# Patient Record
Sex: FEMALE | Race: WHITE | NOT HISPANIC OR LATINO | Employment: FULL TIME | URBAN - METROPOLITAN AREA
[De-identification: names, ages, dates, MRNs, and addresses within clinical notes are randomized per-mention and may not be internally consistent; named-entity substitution may affect disease eponyms.]

---

## 2017-01-17 ENCOUNTER — LAB VISIT (OUTPATIENT)
Dept: LAB | Facility: HOSPITAL | Age: 47
End: 2017-01-17
Payer: COMMERCIAL

## 2017-01-17 ENCOUNTER — OFFICE VISIT (OUTPATIENT)
Dept: OBSTETRICS AND GYNECOLOGY | Facility: CLINIC | Age: 47
End: 2017-01-17
Payer: COMMERCIAL

## 2017-01-17 VITALS
HEIGHT: 65 IN | BODY MASS INDEX: 19.48 KG/M2 | SYSTOLIC BLOOD PRESSURE: 92 MMHG | WEIGHT: 116.94 LBS | DIASTOLIC BLOOD PRESSURE: 64 MMHG

## 2017-01-17 DIAGNOSIS — Z12.31 SCREENING MAMMOGRAM, ENCOUNTER FOR: ICD-10-CM

## 2017-01-17 DIAGNOSIS — F32.89 OTHER DEPRESSION: ICD-10-CM

## 2017-01-17 DIAGNOSIS — Z12.31 ENCOUNTER FOR SCREENING MAMMOGRAM FOR MALIGNANT NEOPLASM OF BREAST: ICD-10-CM

## 2017-01-17 DIAGNOSIS — N95.1 SYMPTOMATIC MENOPAUSAL OR FEMALE CLIMACTERIC STATES: ICD-10-CM

## 2017-01-17 DIAGNOSIS — N64.4 BREAST TENDERNESS: ICD-10-CM

## 2017-01-17 DIAGNOSIS — R14.0 BLOATING: ICD-10-CM

## 2017-01-17 DIAGNOSIS — N95.1 SYMPTOMATIC MENOPAUSAL OR FEMALE CLIMACTERIC STATES: Primary | ICD-10-CM

## 2017-01-17 LAB
FSH SERPL-ACNC: 5 MIU/ML
T4 FREE SERPL-MCNC: 0.95 NG/DL
TSH SERPL DL<=0.005 MIU/L-ACNC: 3.43 UIU/ML

## 2017-01-17 PROCEDURE — 84439 ASSAY OF FREE THYROXINE: CPT

## 2017-01-17 PROCEDURE — 84443 ASSAY THYROID STIM HORMONE: CPT

## 2017-01-17 PROCEDURE — 83001 ASSAY OF GONADOTROPIN (FSH): CPT

## 2017-01-17 PROCEDURE — 1159F MED LIST DOCD IN RCRD: CPT | Mod: S$GLB,,, | Performed by: NURSE PRACTITIONER

## 2017-01-17 PROCEDURE — 99999 PR PBB SHADOW E&M-EST. PATIENT-LVL III: CPT | Mod: PBBFAC,,, | Performed by: NURSE PRACTITIONER

## 2017-01-17 PROCEDURE — 99202 OFFICE O/P NEW SF 15 MIN: CPT | Mod: S$GLB,,, | Performed by: NURSE PRACTITIONER

## 2017-01-17 RX ORDER — IVERMECTIN 10 MG/G
CREAM TOPICAL
COMMUNITY
Start: 2016-12-29 | End: 2018-12-17

## 2017-01-17 RX ORDER — DAPSONE 50 MG/G
GEL TOPICAL
COMMUNITY
Start: 2016-12-29 | End: 2018-06-04

## 2017-01-17 RX ORDER — BUDESONIDE 3 MG/1
3 CAPSULE, COATED PELLETS ORAL 3 TIMES DAILY
Refills: 3 | COMMUNITY
Start: 2016-11-30 | End: 2017-09-13 | Stop reason: SDUPTHER

## 2017-01-17 NOTE — PATIENT INSTRUCTIONS
Things to Remember about Feminine Hygiene and Safety   1. Wipe from front to back after using the bathroom   2. If possible, urinate before and definitely after sexual intercourse or masturbation   3. I recommend bathing with liquid soap vs a bar soap. Non-scented antibacterial soap: Dial or Neutrogena soap   4. Shower or rinse off before sitting in a bathtub full of dirty water   5. Do not douche of any kind   6. It is recommended that you take in plenty of fluids. Eight glasses of 8 ounces of water is recommended daily (UNLESS MEDICAL PROBLEMS INDICATE OTHERWISE)   7. It is recommended to change tampons and pads every 2-3 hours or as saturated   8. Use condoms to protect yourself against Sexually Transmitted Infections   9. Wear your seat belt    Self Breast Exam/Awareness (SBE)  1. Examine your breast monthly, about 4-7 days after the start of your cycle. If you no longer get a cycle pick the same day of the month to perform SBE.  2. Look for any dimpling, rashes, nipple discharge, or changes in breast size  3. Feel for any lumps or hard nodules (example, frozen vegetable) using the pads of your fingers. Make sure to examine under the arm  4. The best time to perform examine is either while in the shower or while lying down.  5. You know your breast and if you are more aware of what your breasts normally feel like then you will be able to identify changes more quickly and notify your health care provider    Tobacco Resources    Pick a start date and stick with it   OTC options: nicotine gum, patch, lozenge   Talk with your primary care doctor about prescription options: Nicotine nasal spray, Bupropion (pill), Varenicline (pill)   Inform clothes family and friends of quit date for support   Call 7-539-DEQFKDY (738-5086) to be connected with the quit line in your state (free)   Avoid being around smoking, avoid alcohol, eat healthy food and exercise   Keep positive attitude, You Can Do It!    Healthy Weight  Resources   Myfitnesspal (can be found on [employer] store--free)   National Weight Control Registry    Follow the 9 inch plate method below to help with portion control. ( ½ plate vegetable, ¼ plate starch and ¼ plate protein) After Start incorporating healthy foods in each plate section.   DASH Diet--lowering blood pressure (http://www.nhlbi.nih.gov/health/public/heart/hbp/dash/new_dash.pdf)     Calcium and Vitamin D  Prepared for the subscribers of  Pharmacists Letter / Prescribers Letter to give to their patients.  Copyright © 2012 by [employer]  www.pharmacistsletter.com ~ www.prescribersletter.com  Why do I need calcium and vitamin D?  Calcium and vitamin D are needed for strong bones. Nerves, muscles, and blood  vessels need calcium to work. Vitamin D helps the body absorb calcium. Vitamin D  helps strengthen muscles and prevent falls in older people. Vitamin D might also  help prevent cancer and heart disease.  What are sources of calcium and vitamin D?  Calcium is found in foods. Dairy products are good sources. Eight ounces of yogurt  (228 gram) or milk (1 cup [236 mL]), or 1.5 oz. (43 gram) of cheese, can provide  around 300 mg. Orange juice with added calcium has 300 mg per 8 oz. (236 mL)  serving. Vitamin D is made by sun-exposed skin. It is also found in some foods.  [place] is one of the best sources. A 3 oz. (86 gram) serving of sockeye salmon has  almost 800 IU. A 3 oz. serving of tuna canned in water has about  150 IU. Dairy products with added vitamin D are good sources. Examples are a cup  of milk (115 to 124 IU) or 6 ozs. (171 grams) of yogurt (80 IU). A cup of orange  juice with added vitamin D has 80 IU. Calcium and vitamin D supplements are also  available.  Do I need a supplement? Are they safe?  Many people are low on vitamin D. It is hard to get enough vitamin D from food.  And most people dont get much sun exposure. They use sunscreens, stay indoors, or  live at a  northern latitude. So most people need a vitamin D supplement. Ask if you  should have your vitamin D level checked.  People get about 300 mg calcium from nondairy foods daily. If you eat two servings  of high-calcium foods (e.g., dairy), you can get around 900 mg per day total. Adding  a 300 mg calcium supplement daily, or a third high-calcium serving, will provide a  total of 1200 mg daily. You may have heard calcium supplements are not safe.  There has been bad press about heart attacks and prostate cancer. Calcium  supplements have not been proven unsafe. But dont go overboard with calcium  supplements. Get your calcium from diet when possible. Avoid calcium  supplements from coral or dolomite, a kind of limestone. They can contain heavy  metals like lead.  How do I choose a calcium or vitamin D supplement?  Most calcium products are calcium carbonate (e.g., Tums, Caltrate) or calcium  citrate (e.g., Citracal). Both work. Calcium carbonate doesnt cost much and  provides the most calcium per dose. Read the label to check the calcium amount  per serving. This can vary based on the type of calcium you select. Calcium  Calcium and Vitamin D  Prepared for the subscribers of  Pharmacists Letter / Prescribers Letter to give to their patients.  Copyright © 2012 by [employer]  www.pharmacistsletter.com ~ www.prescribersletter.com  citrate may be better for patients who dont absorb calcium as well. Examples are  older people or those on certain heartburn medications. Calcium is best absorbed if  not more than 500 mg is taken at a time. Some supplements contain other  ingredients (e.g., magnesium, vitamin K). These dont work any better than those  with just calcium. Vitamin D is available over-the-counter in some calcium products  or by itself. There are also high-dose vitamin D products that are prescribed if you  have low vitamin D levels. It is okay to take a multivitamin or eat vitamin  Dcontaining  foods while taking prescription-strength vitamin D. Vitamin D comes as  vitamin D2 or vitamin D3. Either can be used. In the [place] look for a vitamin D  supplement that is USP Verified. In [place], look for a product with a Natural  Product Number (NPN). These products have been tested for good quality.  How much calcium and vitamin D do I need?  Women up to 50 years old and men up to age 70 should aim for 1000 mg of calcium  daily total (from food and supplements). Women over 50 years old and men over  70 should aim for 1200 mg of calcium daily total (from food and supplements).  Most experts recommend that adults get 800 IU to 2000 IU of vitamin D daily for the  best health benefits.  [June 2012]

## 2017-01-17 NOTE — PROGRESS NOTES
"Chief Complaint: depression/bloating/breast tenderness/weight gain      HPI: 47 y.o. female here with multiple concerns. She reports that she was seen for annual in June and was having bloating and 2lb weight gain and feels as though she can't loose weight. She also feels increased breast tenderness around the time of her cycle and occasional hot flashes since exam in June, no changes. She reports no changes in cycles, still monthly and 3-5 days and no changes in flow. She had a TVUS that was essentially neg.   In a long discussion with the pt she reports feeling depressed over the last year and has had a lot of life changes, she is no longer with her partner since Oct 2015, has work stress as well. She feels this is all contributing as well. She hasn't talked with psych and also hasn't tried COCs bc she didn't like the idea of not having a cycle. She states she feels depressed but does deny SI/HI. Reports being "obsessed" with her weight since around age 22.     ROS   Systemic: Not feeling tired (fatigue).  No fever chills   Gastrointestinal: No nausea, vomiting, no abdominal pain.  No diarrhea.  Genitourinary: No dysuria. No Pelvic Pain  Skin: No rash.  Visit Vitals    BP 92/64    Ht 5' 5" (1.651 m)    Wt 53 kg (116 lb 15.3 oz)    LMP 01/12/2017 (Exact Date)    BMI 19.46 kg/m2     Physical Exam  Vital Signs: ° Normal.  General Appearance: ° well developed.  ° Well nourished.  Neck: °Symmetrical °Trachea appearance mid-line  Eyes: °Extra-ocular movements normal   Psychiatric: Affect: ° Normal.  Neurological: ° No disorientation       Plan:  1. Bloating/Weight gain--reassurance given with pt about 2 lb weight gain is not considered abnormal and also with increased exercise this could be r/t muscle weight.    2. Depression--referral to psych discussed and number to Chrissy Lynne given today. She is not interested in medication at this time.    3. Perimenopausal/Climacteric Symptoms--hormone levels ordered and discussed " SANDY trial for 3 months. Declines at this time.\    4. Breast tenderness-- pt has not had fu mmg and due Dec 2016. She will schedule apt to make sure no underling etiology, pt has h/o breast cyst    Long discussion with pt and this may all be related to number 2. Will call with lab results and changes in plan.

## 2017-01-17 NOTE — MR AVS SNAPSHOT
[facility]  [address]  [place] [address]  Phone: [phone]  Fax: [phone]                  Raiza Cherry   2017 8:00 AM   Office Visit    Description:  Female : 1970   Provider:  Kaitlin Mejia NP   Department:  [facility]           Reason for Visit     Premenstrual Syndrome           Diagnoses this Visit        Comments    Screening mammogram, encounter for    -  Primary     Symptomatic menopausal or female climacteric states         Encounter for screening mammogram for malignant neoplasm of breast                To Do List           Future Appointments        Provider Department Dept Phone    2017 9:00 AM Estuardo Robles MD [facility] [phone]      Goals (5 Years of Data)     None      [facility]     [facility] On [facility] Nurse Care Line -  Assistance  Registered nurses in the [facility] Call Center provide clinical advisement, health education, appointment booking, and other advisory services.  Call for this free service at [phone].             Medications           Message regarding Medications     Verify the changes and/or additions to your medication regime listed below are the same as discussed with your clinician today.  If any of these changes or additions are incorrect, please notify your healthcare provider.             Verify that the below list of medications is an accurate representation of the medications you are currently taking.  If none reported, the list may be blank. If incorrect, please contact your healthcare provider. Carry this list with you in case of emergency.           Current Medications     ACZONE 5 % topical gel     SOOLANTRA 1 % Crea     timolol maleate 0.5% (TIMOPTIC) 0.5 % Drop Place 1 drop into both eyes 2 (two) times daily.    budesonide (ENTOCORT EC) 3 mg capsule Take 3 mg by mouth 3 (three) times daily.           Clinical Reference Information           Vital Signs -  "Last Recorded  Most recent update: 1/17/2017  8:18 AM by Gricelda Restrepo MA    BP Ht Wt LMP BMI    92/64 5' 5" (1.651 m) 53 kg (116 lb 15.3 oz) 01/12/2017 (Exact Date) 19.46 kg/m2      Blood Pressure          Most Recent Value    BP  92/64      Allergies as of 1/17/2017     Penicillin      Immunizations Administered on Date of Encounter - 1/17/2017     None      Orders Placed During Today's Visit      Normal Orders This Visit    Mammo Digital Screening Bilat with Tomosynthesis_CAD     Future Labs/Procedures Expected by Expires    Follicle stimulating hormone  1/17/2017 3/18/2018    Mammo Digital Screening Bilat With CAD  1/17/2017 3/17/2018    Mammo Digital Screening Bilat with Tomosynthesis_CAD  1/17/2017 3/17/2018    T4, free  1/17/2017 3/18/2018    TSH  1/17/2017 3/18/2018      [facility] Sign-Up     Activating your [facility] account is as easy as 1-2-3!     1) Visit [email or web address], select Sign Up Now, enter this activation code and your date of birth, then select Next.  [email or web address]  Expires: 3/3/2017  8:53 AM      2) Create a username and password to use when you visit [facility] in the future and select a security question in case you lose your password and select Next.    3) Enter your e-mail address and click Sign Up!    Additional Information  If you have questions, please e-mail [email or web address] or call [phone] to talk to our [facility] staff. Remember, [facility] is NOT to be used for urgent needs. For medical emergencies, dial 911.         "

## 2017-01-19 ENCOUNTER — TELEPHONE (OUTPATIENT)
Dept: OBSTETRICS AND GYNECOLOGY | Facility: CLINIC | Age: 47
End: 2017-01-19

## 2017-01-19 DIAGNOSIS — N92.4 ABNORMAL PERIMENOPAUSAL BLEEDING: Primary | ICD-10-CM

## 2017-01-19 DIAGNOSIS — N95.1 PERIMENOPAUSAL SYMPTOMS: ICD-10-CM

## 2017-01-19 RX ORDER — NORETHINDRONE ACETATE AND ETHINYL ESTRADIOL 1MG-20(21)
1 KIT ORAL DAILY
Qty: 28 TABLET | Refills: 3 | Status: SHIPPED | OUTPATIENT
Start: 2017-01-19 | End: 2017-09-21

## 2017-01-19 NOTE — TELEPHONE ENCOUNTER
Discussed lab results with pt. She is hesitant but would like to start Loestrin to help with them monthly bloating and cramping. She will also set apt with psych.  In discussing with pt if she does start Loestrin she will call to make a 3 month FU apt to review symptoms and check for symptom improvement and BP check.

## 2017-01-24 ENCOUNTER — TELEPHONE (OUTPATIENT)
Dept: OBSTETRICS AND GYNECOLOGY | Facility: CLINIC | Age: 47
End: 2017-01-24

## 2017-09-12 ENCOUNTER — TELEPHONE (OUTPATIENT)
Dept: GASTROENTEROLOGY | Facility: CLINIC | Age: 47
End: 2017-09-12

## 2017-09-12 NOTE — TELEPHONE ENCOUNTER
Spoke with the patient in regards to medications refill, I informed the patient that I will speak with the doctor in regards.

## 2017-09-12 NOTE — TELEPHONE ENCOUNTER
----- Message from Yumiko Martin sent at 9/12/2017 12:04 PM CDT -----  Contact: [phone]  Patient says her pharmacy has been trying to reach you regarding her refills of  Budesonide 3 mg and suppositories. Please advise.

## 2017-09-13 DIAGNOSIS — K51.919: Primary | ICD-10-CM

## 2017-09-13 RX ORDER — BUDESONIDE 3 MG/1
3 CAPSULE, COATED PELLETS ORAL 3 TIMES DAILY
Qty: 90 CAPSULE | Refills: 3 | Status: SHIPPED | OUTPATIENT
Start: 2017-09-13 | End: 2018-01-18 | Stop reason: SDUPTHER

## 2017-09-21 ENCOUNTER — OFFICE VISIT (OUTPATIENT)
Dept: OBSTETRICS AND GYNECOLOGY | Facility: CLINIC | Age: 47
End: 2017-09-21
Payer: COMMERCIAL

## 2017-09-21 VITALS
DIASTOLIC BLOOD PRESSURE: 72 MMHG | SYSTOLIC BLOOD PRESSURE: 110 MMHG | BODY MASS INDEX: 19.43 KG/M2 | HEIGHT: 65 IN | WEIGHT: 116.63 LBS

## 2017-09-21 DIAGNOSIS — Z01.419 ENCOUNTER FOR GYNECOLOGICAL EXAMINATION: ICD-10-CM

## 2017-09-21 DIAGNOSIS — Z12.31 ENCOUNTER FOR SCREENING MAMMOGRAM FOR BREAST CANCER: Primary | ICD-10-CM

## 2017-09-21 DIAGNOSIS — Z12.4 SCREENING FOR MALIGNANT NEOPLASM OF THE CERVIX: ICD-10-CM

## 2017-09-21 PROCEDURE — 99396 PREV VISIT EST AGE 40-64: CPT | Mod: S$GLB,,, | Performed by: OBSTETRICS & GYNECOLOGY

## 2017-09-21 PROCEDURE — 88141 CYTOPATH C/V INTERPRET: CPT | Mod: ,,, | Performed by: PATHOLOGY

## 2017-09-21 PROCEDURE — 88175 CYTOPATH C/V AUTO FLUID REDO: CPT | Performed by: PATHOLOGY

## 2017-09-21 PROCEDURE — 99999 PR PBB SHADOW E&M-EST. PATIENT-LVL III: CPT | Mod: PBBFAC,,, | Performed by: OBSTETRICS & GYNECOLOGY

## 2017-09-21 NOTE — PROGRESS NOTES
CC: Well woman exam    Raiza Cherry is a 47 y.o. female  presents for a well woman exam.  She is established.  LMP: Patient's last menstrual period was 2017 (approximate)..   Last menstrual period .  Periods are starting to become slightly irregular and occasionally has night sweats and sleep disturbances.  Patient does not desire OCPs nor medication.  Takes melatonin currently for sleep.  She currently is not tracking her cycles.  Encouraged her to keep track of her menstrual cycles on menstrual cycle at.  Will follow back up with her in 6 months when we repeat her Pap smear.    Annual Exam and 6month repeat pap --   Last Pap/Hpv 16, ASCUS/HPV Neg --   Last MMG 17, Normal    Last colonoscopy 4 years ago with Dr. Crawley.  Patient has ulcerative colitis and has an appointment with him next month.      Health Maintenance   Topic Date Due    Lipid Panel  1970    TETANUS VACCINE  1986    Influenza Vaccine  2017    Mammogram  2019    Pap Smear with HPV Cotest  2019         Past Medical History:   Diagnosis Date    Abnormal Pap smear of cervix     Glaucoma     Insomnia     Ulcerative colitis     proctitis       Past Surgical History:   Procedure Laterality Date    BREAST SURGERY      enlargement and lift of both breasts    CATARACT EXTRACTION      left eye    CERVICAL BIOPSY  W/ LOOP ELECTRODE EXCISION  2016    Moderate dysplasia completely excised margins clear and ECC negative       OB History    Para Term  AB Living   3 3 3     3   SAB TAB Ectopic Multiple Live Births           3      # Outcome Date GA Lbr Jordon/2nd Weight Sex Delivery Anes PTL Lv   3 Term 10/24/07 40w0d  3.204 kg (7 lb 1 oz) M Vag-Spont EPI  KM   2 Term 96 40w0d  3.43 kg (7 lb 9 oz) F Vag-Spont EPI  KM   1 Term 04/15/93 40w0d  3.09 kg (6 lb 13 oz) F Vag-Spont EPI  KM      Obstetric Comments   Menarche ~ 11       Family History  "  Problem Relation Age of Onset    Diabetes Mother     Breast cancer Neg Hx     Colon cancer Neg Hx     Ovarian cancer Neg Hx     Cancer Neg Hx        Social History   Substance Use Topics    Smoking status: Never Smoker    Smokeless tobacco: Never Used    Alcohol use Yes      Comment: Rarely       /72   Ht 5' 5" (1.651 m)   Wt 52.9 kg (116 lb 10 oz)   LMP 08/17/2017 (Approximate)   BMI 19.41 kg/m²       ROS:  GENERAL: Denies weight gain or weight loss. Feeling well overall.   SKIN: Denies rash or lesions.   HEAD: Denies head injury or headache.   NODES: Denies enlarged lymph nodes.   CHEST: Denies chest pain or shortness of breath.   CARDIOVASCULAR: Denies palpitations or left sided chest pain.   ABDOMEN: No abdominal pain, constipation, diarrhea, nausea, vomiting or rectal bleeding.   URINARY: No frequency, dysuria, hematuria, or burning on urination.  REPRODUCTIVE: See HPI.   BREASTS: The patient performs breast self-examination and denies pain, lumps, or nipple discharge.   HEMATOLOGIC: No easy bruisability or excessive bleeding.  MUSCULOSKELETAL: Denies joint pain or swelling.   NEUROLOGIC: Denies syncope or weakness.   PSYCHIATRIC: Denies depression, anxiety or mood swings.    Physical Exam:    APPEARANCE: Well nourished, well developed, in no acute distress.  AFFECT: WNL, alert and oriented x 3  SKIN: No acne or hirsutism  ABDOMEN: Soft.  No tenderness or masses.  No hepatosplenomegaly.  No hernias.  BREASTS: Symmetrical, no skin changes or visible lesions.  No palpable masses, nipple discharge bilaterally.  PELVIC: Normal external genitalia without lesions.  Normal hair distribution.  Adequate perineal body, normal urethral meatus.  Vagina moist and well rugated without lesions or discharge.  Cervix pink, without lesions, discharge or tenderness.  No significant cystocele or rectocele.  Bimanual exam shows uterus to be normal size, regular, mobile and nontender.  Adnexa without masses or " tenderness.    EXTREMITIES: No edema.    ASSESSMENT AND PLAN  1. Encounter for screening mammogram for breast cancer  Mammo Digital Screening Bilat with Tomosynthesis CAD   2. Screening for malignant neoplasm of the cervix  Liquid-based pap smear, screening   3. Encounter for gynecological examination   status post LEEP in 2016.  Repeat Pap smear performed today.   Patient having some perimenopausal symptoms.  Declines OCPs/hormone therapy.   We'll continue to observe closely.         Patient was counseled today on A.C.S. Pap guidelines and recommendations for yearly pelvic exams, mammograms and monthly self breast exams; to see her PCP for other health maintenance.     Return in about 6 months (around 3/21/2018) for repeat pap and f/u of menstrual calendar.

## 2017-09-26 NOTE — PROGRESS NOTES
Please call      Pap returning this time with low grade abnormal cells.   Unfortunately she will need another colpo.   Please sched colpo within 4 weeks

## 2017-10-04 ENCOUNTER — OFFICE VISIT (OUTPATIENT)
Dept: GASTROENTEROLOGY | Facility: CLINIC | Age: 47
End: 2017-10-04
Payer: COMMERCIAL

## 2017-10-04 ENCOUNTER — LAB VISIT (OUTPATIENT)
Dept: LAB | Facility: HOSPITAL | Age: 47
End: 2017-10-04
Attending: INTERNAL MEDICINE
Payer: COMMERCIAL

## 2017-10-04 VITALS
DIASTOLIC BLOOD PRESSURE: 81 MMHG | WEIGHT: 52.81 LBS | BODY MASS INDEX: 8.8 KG/M2 | HEART RATE: 76 BPM | HEIGHT: 65 IN | SYSTOLIC BLOOD PRESSURE: 131 MMHG

## 2017-10-04 DIAGNOSIS — K51.919: ICD-10-CM

## 2017-10-04 DIAGNOSIS — R19.7 DIARRHEA, UNSPECIFIED TYPE: ICD-10-CM

## 2017-10-04 DIAGNOSIS — K51.919: Primary | ICD-10-CM

## 2017-10-04 DIAGNOSIS — K62.5 RECTAL BLEEDING: ICD-10-CM

## 2017-10-04 DIAGNOSIS — R11.0 NAUSEA: ICD-10-CM

## 2017-10-04 LAB
ALBUMIN SERPL BCP-MCNC: 4.1 G/DL
ALP SERPL-CCNC: 79 U/L
ALT SERPL W/O P-5'-P-CCNC: 16 U/L
ANION GAP SERPL CALC-SCNC: 6 MMOL/L
AST SERPL-CCNC: 15 U/L
BASOPHILS # BLD AUTO: 0.08 K/UL
BASOPHILS NFR BLD: 1 %
BILIRUB SERPL-MCNC: 0.4 MG/DL
BUN SERPL-MCNC: 11 MG/DL
CALCIUM SERPL-MCNC: 9.5 MG/DL
CHLORIDE SERPL-SCNC: 102 MMOL/L
CO2 SERPL-SCNC: 30 MMOL/L
CREAT SERPL-MCNC: 0.7 MG/DL
CRP SERPL-MCNC: 0.4 MG/L
DIFFERENTIAL METHOD: ABNORMAL
EOSINOPHIL # BLD AUTO: 0.1 K/UL
EOSINOPHIL NFR BLD: 1 %
ERYTHROCYTE [DISTWIDTH] IN BLOOD BY AUTOMATED COUNT: 13 %
EST. GFR  (AFRICAN AMERICAN): >60 ML/MIN/1.73 M^2
EST. GFR  (NON AFRICAN AMERICAN): >60 ML/MIN/1.73 M^2
GLUCOSE SERPL-MCNC: 96 MG/DL
HCT VFR BLD AUTO: 40.8 %
HGB BLD-MCNC: 13.5 G/DL
LYMPHOCYTES # BLD AUTO: 2.6 K/UL
LYMPHOCYTES NFR BLD: 33.4 %
MCH RBC QN AUTO: 31.7 PG
MCHC RBC AUTO-ENTMCNC: 33.1 G/DL
MCV RBC AUTO: 96 FL
MONOCYTES # BLD AUTO: 0.7 K/UL
MONOCYTES NFR BLD: 8.6 %
NEUTROPHILS # BLD AUTO: 4.4 K/UL
NEUTROPHILS NFR BLD: 55.7 %
PLATELET # BLD AUTO: 402 K/UL
PMV BLD AUTO: 9 FL
POTASSIUM SERPL-SCNC: 4.1 MMOL/L
PROT SERPL-MCNC: 7.8 G/DL
RBC # BLD AUTO: 4.26 M/UL
SODIUM SERPL-SCNC: 138 MMOL/L
WBC # BLD AUTO: 7.87 K/UL

## 2017-10-04 PROCEDURE — 36415 COLL VENOUS BLD VENIPUNCTURE: CPT

## 2017-10-04 PROCEDURE — 99999 PR PBB SHADOW E&M-EST. PATIENT-LVL III: CPT | Mod: PBBFAC,,, | Performed by: INTERNAL MEDICINE

## 2017-10-04 PROCEDURE — 80053 COMPREHEN METABOLIC PANEL: CPT

## 2017-10-04 PROCEDURE — 99204 OFFICE O/P NEW MOD 45 MIN: CPT | Mod: S$GLB,,, | Performed by: INTERNAL MEDICINE

## 2017-10-04 PROCEDURE — 86140 C-REACTIVE PROTEIN: CPT

## 2017-10-04 PROCEDURE — 85025 COMPLETE CBC W/AUTO DIFF WBC: CPT

## 2017-10-04 RX ORDER — MESALAMINE 4 G/60ML
4 SUSPENSION RECTAL NIGHTLY
Qty: 900 ML | Refills: 4 | Status: SHIPPED | OUTPATIENT
Start: 2017-10-04 | End: 2017-12-18 | Stop reason: SDUPTHER

## 2017-10-04 NOTE — PATIENT INSTRUCTIONS
Ulcerative Colitis  You have been diagnosed with ulcerative colitis. Ulcerative colitis is a chronic condition that causes inflammation and ulcers in the rectum and colon. It is a form of inflammatory bowel disease (IBD). The disease is usually diagnosed by a special procedure called a colonoscopy. The symptoms usually develop over time. There is no medicine that can cure ulcerative colitis. The goal of treatment is to reduce the symptoms, and cause a remission.  Symptoms of ulcerative colitis include:  · Abdominal cramps and pain  · Diarrhea, usually bloody  · Rectal bleeding  · Rectal pain  · Fever  · Decreased appetite and weight loss  · Low energy  · Inflammation outside of the colon can occur and can cause pain or swelling in places like the eyes, skin, and joints  Home care  No one knows what exactly causes IBD. The goal is to control and relieve the symptoms, and prevent complications, so you can lead a full and active life. No medicine can cure the disease, but in some cases, surgery to remove the whole colon can be curative. However, surgery causes other side effects and so medicines are often preferred. Discuss your options with your healthcare provider.  Diet  Your diet did not cause your condition, but it can affect it. Unfortunately, no one diet that works for everyone, so you have to experiment. Below are some recommendations, but what works for you may be different. Keep a food log to figure out what you are sensitive to.  · Eat more slowly. Eat smaller amounts at a time, but more often. Remember, you can always eat more, but can't eat less once you've eaten too much.  · High-fiber foods are complicated. While they may help constipation, they can make bloating, cramping, gas, and diarrhea worse.  · Eat less sugar.  · Try avoiding dairy products if you feel you are sensitive to lactose.  · Try cutting out foods that are high in fat and fatty meats.  · You can control bloating and passing excess gas.  "Be careful with "gassy" vegetables and fruits like beans, cabbage, broccoli, and cauliflower.  · Be careful of carbonated beverages and fruit juices. They can make bloating and diarrhea worse.  · Caffeine, alcohol, and stimulants may make symptoms worse.  Lifestyle  Although stress doesn't cause IBD, it is a factor in flare-ups, and how you feel and react to your condition.  · Look for things that seem to make your symptoms worse, such as stress and emotions.  · Counseling can help you deal with stress. So can self-help measure like exercise, yoga, and meditation.  · Depression can be a part of this illness and antidepressant medicine may be prescribed. This may actually help with diarrhea, constipation, and cramping, as well as symptoms of depression.  · Smoking can make symptoms worse.  · Lack of sleep can make symptoms seem worse.  · Alcohol use can make symptoms worse.  Medicines  Your healthcare provider may prescribe medicines. Take them as directed. In most situations, lifelong medicine is necessary. For acute flares, additional prescription medicines can be prescribed. Call your provider if you need these.  · Ask your healthcare provider before taking any medicines for diarrhea.  · Avoid anti-inflammatory medicines like ibuprofen or naproxen.  · Consider nutritional supplements. This is especially true if the diarrhea is prolonged, or you aren't eating or are losing weight.  Follow-up care  Follow up with your healthcare provider, or as advised. Tell your provider if you lose more than 5 pounds over 3 to 6 months, and you aren't trying to lose weight.  If a stool sample was taken, or cultures were done, you will be told if they are positive, or if your treatment needs to be changed. You can call as directed for results.  If X-rays were done, a radiologist will look at them. You will be told if you need a change in treatment  It is very important to tell your doctor if you intend to get pregnant, or find out " you are pregnant. You will need to discuss your disease, medicines, and plan as early as possible and preferably before you conceive.  Call 911  Call 911 if any of these occur:  · Trouble breathing  · Confusion  · Very drowsy or trouble awakening  · Fainting or loss of consciousness  · Rapid heart rate  · Chest pain  When to seek medical advice  Call your healthcare provider right away if any of these occur:  · Bleeding from your rectum  · Frequent diarrhea or abdominal pain that's not controlled by your medicine  · Bloody diarrhea  · Fever of 100.4ºF (38ºC) or higher, or as directed by your health care provider  · Persistent nausea or repeated vomiting   Date Last Reviewed: 12/30/2015  © 8624-6577 [employer]. [address] [place] [address], [place] [address]. All rights reserved. This information is not intended as a substitute for professional medical care. Always follow your healthcare professional's instructions.

## 2017-10-04 NOTE — PROGRESS NOTES
Subjective:      Patient ID: Raiza Cherry is a 47 y.o. female.    Chief Complaint: Medication Refill; Abdominal Pain; and Change in bowel    HPI:     Patient a 47-year-old female presenting for GI follow-up.  Her last GI office visit was in October 2013.  At that time she was taking oral budesonide 4 distal ulcerative colitis.  She indicates that soon after that visit, her colitis went into remission.  Indicates she has been symptom-free for several years.  Indicates that in July or August of this year she began having gradually more frequent loose bowel movements.  She resumed budesonide but without much improvement.    Progressed to having abdominal cramping with some blood in her bowel movements.  Currently she is doing better for the past several days.    She was initially diagnosed with distal colitis around 1990.  Initially treated with Azulfidine and prednisone.  She followed up somewhat inconsistently but as recently as 2010 colonoscopy demonstrated mild proctitis only.  She had used a variety of medications to control symptoms including Rowasa, Azulfidine, prednisone, Asacol, and briefly Imuran at various times in her course.    The most recent documented flare occurred while on Entocort EC in July 2013.    She began having more frequent bowel movements and passing small amounts of blood with each bowel movement.  Subsequently was retreated with Rowasa.  Patient was then lost to follow-up.    Mild proctitis was noted that her last colonoscopy in 2010    Past GI history: Her distal colitis was diagnosed in about 1990.  She was initially treated with Azulfidine and prednisone.   She followed up inconsistently but the disease generally remained limited to the rectosigmoid.    At the last endoscopic evaluation in March of 2010 she had mild inflammatory changes from 0-20 cm only.     She admits increased stress at work.  She exercises regularly.    Is a nonsmoker nondrinker.       Review of patient's  "allergies indicates:   Allergen Reactions    Penicillin Rash     Past Medical History:   Diagnosis Date    Abnormal Pap smear of cervix     Glaucoma     Insomnia     Ulcerative colitis     proctitis     Past Surgical History:   Procedure Laterality Date    BREAST SURGERY  2014    enlargement and lift of both breasts    CATARACT EXTRACTION  2013    left eye    CERVICAL BIOPSY  W/ LOOP ELECTRODE EXCISION  02/2016    Moderate dysplasia completely excised margins clear and ECC negative     Family History   Problem Relation Age of Onset    Diabetes Mother     Heart disease Mother     Breast cancer Neg Hx     Colon cancer Neg Hx     Ovarian cancer Neg Hx     Cancer Neg Hx      Social History     Social History    Marital status:      Spouse name: N/A    Number of children: N/A    Years of education: N/A     Occupational History    Not on file.     Social History Main Topics    Smoking status: Never Smoker    Smokeless tobacco: Never Used    Alcohol use Yes      Comment: Rarely    Drug use: No    Sexual activity: Not Currently     Partners: Male     Birth control/ protection: None      Comment:      Other Topics Concern    Not on file     Social History Narrative    No narrative on file       Review of Systems:  Constitutional: Negative for appetite change.   HENT: Negative for trouble swallowing.   Eyes: Negative for photophobia.   Respiratory: Negative for cough and shortness of breath.   Cardiovascular: Negative for palpitations.   Gastrointestinal: See HPI for details.  Genitourinary: Negative for frequency and hematuria.   Skin: Negative for rash.   Neurological: Negative for weakness and headaches.   Hematological: Negative.   Psychiatric/Behavioral: Negative for suicidal ideas and behavioral problems.     Objective:     /81 (BP Location: Right arm, Patient Position: Sitting)   Pulse 76   Ht 5' 5" (1.651 m)   Wt 23.9 kg (52 lb 12.8 oz)   LMP 08/17/2017 (Approximate)  "  BMI 8.79 kg/m²     Physical Exam:  Eyes: Pupils are equal, round, and reactive to light.   Neck: Supple. No mass  Cardiovascular: Regular rhythm . No murmur   Pulmonary/Chest: Lungs clear   Abdominal: Soft. No mass palpated. Nontender, no guarding. Positive bowel sounds   Musculoskeletal: No deformity. No edema.   Psychiatric: Alert and oriented    Assessment:     1. Colitis, chronic, ulcerative, unspecified complication    2. Diarrhea, unspecified type    3. Rectal bleeding    4. Nausea      Plan:     Raiza was seen today for medication refill, abdominal pain and change in bowel.    Diagnoses and all orders for this visit:    Colitis, chronic, ulcerative, unspecified complication  -     mesalamine (ROWASA) 4 gram/60 mL Enem; Place 60 mLs (4 g total) rectally every evening.  -     Case request GI: COLONOSCOPY  -     CBC auto differential; Future  -     Comprehensive metabolic panel; Future  -     C-reactive protein; Future    Diarrhea, unspecified type  -     mesalamine (ROWASA) 4 gram/60 mL Enem; Place 60 mLs (4 g total) rectally every evening.  -     Case request GI: COLONOSCOPY  -     CBC auto differential; Future  -     Comprehensive metabolic panel; Future  -     C-reactive protein; Future    Rectal bleeding  -     mesalamine (ROWASA) 4 gram/60 mL Enem; Place 60 mLs (4 g total) rectally every evening.  -     Case request GI: COLONOSCOPY  -     CBC auto differential; Future  -     Comprehensive metabolic panel; Future  -     C-reactive protein; Future    Nausea      Plan:  Colonoscopy 2 clarify the extent and intensity of her colitis  Continue budesonide 9 mg daily in the interim  Interim

## 2017-10-05 ENCOUNTER — TELEPHONE (OUTPATIENT)
Dept: GASTROENTEROLOGY | Facility: CLINIC | Age: 47
End: 2017-10-05

## 2017-10-05 NOTE — TELEPHONE ENCOUNTER
----- Message from Ignacio Crawley Jr., MD sent at 10/5/2017  1:31 PM CDT -----  Lab work normal. Notify patient.

## 2017-10-19 ENCOUNTER — TELEPHONE (OUTPATIENT)
Dept: GASTROENTEROLOGY | Facility: CLINIC | Age: 47
End: 2017-10-19

## 2017-10-30 ENCOUNTER — PROCEDURE VISIT (OUTPATIENT)
Dept: OBSTETRICS AND GYNECOLOGY | Facility: CLINIC | Age: 47
End: 2017-10-30
Payer: COMMERCIAL

## 2017-10-30 VITALS — BODY MASS INDEX: 19.47 KG/M2 | HEIGHT: 65 IN | WEIGHT: 116.88 LBS

## 2017-10-30 DIAGNOSIS — R87.612 LGSIL ON PAP SMEAR OF CERVIX: Primary | ICD-10-CM

## 2017-10-30 PROCEDURE — 57454 BX/CURETT OF CERVIX W/SCOPE: CPT | Mod: S$GLB,,, | Performed by: OBSTETRICS & GYNECOLOGY

## 2017-10-30 PROCEDURE — 88305 TISSUE EXAM BY PATHOLOGIST: CPT | Mod: 26,,, | Performed by: PATHOLOGY

## 2017-10-30 PROCEDURE — 88305 TISSUE EXAM BY PATHOLOGIST: CPT | Performed by: PATHOLOGY

## 2017-10-30 RX ORDER — BRINZOLAMIDE/BRIMONIDINE TARTRATE 10; 2 MG/ML; MG/ML
1 SUSPENSION/ DROPS OPHTHALMIC 2 TIMES DAILY
Refills: 3 | COMMUNITY
Start: 2017-10-24 | End: 2021-10-28 | Stop reason: SDUPTHER

## 2017-10-30 NOTE — PROCEDURES
Colposcopy   Last pap 9-2017 LGSIL, hpv negative. C/o no cycle since August  Date/Time: 10/30/2017 8:34 AM  Performed by: MARGARITA COLON  Authorized by: MARGARITA COLON     Consent Done?:  Yes (Written)    Colposcopy Site:  Cervix  Position:  Supine  Acrowhite Lesion? Yes    Atypical Vessels? Yes    Transformation Zone Adequate?: Yes    Biopsy?: Yes         Location:  Cervix ((7 00 and 1 00))  ECC Performed?: Yes    LEEP Performed?: No    Estimated blood loss (cc):  0   Patient tolerated the procedure well with no immediate complications.   Post-operative instructions were provided for the patient.   Patient was discharged and will follow up if any complications occur          Capillary seen at 12-1 oclock and AWFL at 7  BX taken x2 ECC done

## 2017-11-03 ENCOUNTER — TELEPHONE (OUTPATIENT)
Dept: OBSTETRICS AND GYNECOLOGY | Facility: CLINIC | Age: 47
End: 2017-11-03

## 2017-11-03 NOTE — PROGRESS NOTES
Hi   Your bx returned with NO CANCER only mildly abnormal cells c/w HPV. We will continue to follow pt closely.  I recommend we repeat the pap in  6 months, take Vit C 1000mg daily and Folic Acid 1000mg daily and USE CONDOMS.    Please call the office  Now and schedule your next pap for 6 months.  See you again in 6 months,  Dr Robles

## 2017-11-03 NOTE — TELEPHONE ENCOUNTER
Patient notified of results per Dr. Robles, verbalizes understanding. Repeat pap scheduled for 3/22/18

## 2017-11-03 NOTE — TELEPHONE ENCOUNTER
----- Message from Estuardo Robles MD sent at 11/3/2017  9:04 AM CDT -----  Hi   Your bx returned with NO CANCER only mildly abnormal cells c/w HPV. We will continue to follow pt closely.  I recommend we repeat the pap in  6 months, take Vit C 1000mg daily and Folic Acid 1000mg daily and USE CONDOMS.    Please call the office  Now and schedule your next pap for 6 months.  See you again in 6 months,  Dr Robles

## 2017-11-28 ENCOUNTER — TELEPHONE (OUTPATIENT)
Dept: OBSTETRICS AND GYNECOLOGY | Facility: CLINIC | Age: 47
End: 2017-11-28

## 2017-11-28 ENCOUNTER — OFFICE VISIT (OUTPATIENT)
Dept: OBSTETRICS AND GYNECOLOGY | Facility: CLINIC | Age: 47
End: 2017-11-28
Payer: COMMERCIAL

## 2017-11-28 VITALS
BODY MASS INDEX: 19.54 KG/M2 | HEIGHT: 65 IN | DIASTOLIC BLOOD PRESSURE: 60 MMHG | WEIGHT: 117.31 LBS | SYSTOLIC BLOOD PRESSURE: 108 MMHG

## 2017-11-28 DIAGNOSIS — N90.89 VULVAL LESION: Primary | ICD-10-CM

## 2017-11-28 PROCEDURE — 56606 BIOPSY OF VULVA/PERINEUM: CPT | Mod: S$GLB,,, | Performed by: NURSE PRACTITIONER

## 2017-11-28 PROCEDURE — 88305 TISSUE EXAM BY PATHOLOGIST: CPT | Performed by: PATHOLOGY

## 2017-11-28 PROCEDURE — 56605 BIOPSY OF VULVA/PERINEUM: CPT | Mod: S$GLB,,, | Performed by: NURSE PRACTITIONER

## 2017-11-28 PROCEDURE — 99215 OFFICE O/P EST HI 40 MIN: CPT | Mod: 25,S$GLB,, | Performed by: NURSE PRACTITIONER

## 2017-11-28 PROCEDURE — 99999 PR PBB SHADOW E&M-EST. PATIENT-LVL III: CPT | Mod: PBBFAC,,, | Performed by: NURSE PRACTITIONER

## 2017-11-28 NOTE — TELEPHONE ENCOUNTER
Bone pt, calling pt states she has something going on but wouldn't tell her her problem. Pt # [phone]

## 2017-11-28 NOTE — TELEPHONE ENCOUNTER
Pt states she has a bump and it looks like a wart.  She is asking if she can take anything or do anything to get rid of it.  Scheduled with Leeann today.

## 2017-11-28 NOTE — PROGRESS NOTES
Chief Complaint: Problem:     Chief Complaint   Patient presents with    vag bump     Dr OLIVEIRA  last pap  low grade abnormal cells  hpv neg     Vulvar Itch        (Dr. Robles patient)  Last Pap:  17 (abnormal: LSIL)      HPI:      Raiza Cherry is a 47 y.o.  who presents today for complaints of two bumps, which she thinks may possibly be warts on her labia.  She thinks they have probably been there for about a month.  She denies pain, itching, or swelling to bumps.  Denies history of HSV.   She would like the lesions removed today if possible, as they bother her to see.  LMP: Patient's last menstrual period was 2017..    Ms. Cherry is currently sexually active with a single male partner.   She declines STD screening today with her examination.      Past Medical History:   Diagnosis Date    Abnormal Pap smear of cervix     Glaucoma     Insomnia     Ulcerative colitis     proctitis     Past Surgical History:   Procedure Laterality Date    BREAST SURGERY      enlargement and lift of both breasts    CATARACT EXTRACTION      left eye    CERVICAL BIOPSY  W/ LOOP ELECTRODE EXCISION  2016    Moderate dysplasia completely excised margins clear and ECC negative     Social History     Social History    Marital status:      Spouse name: N/A    Number of children: N/A    Years of education: N/A     Occupational History    Not on file.     Social History Main Topics    Smoking status: Never Smoker    Smokeless tobacco: Never Used    Alcohol use Yes      Comment: Rarely    Drug use: No    Sexual activity: Not Currently     Partners: Male     Birth control/ protection: None      Comment:      Other Topics Concern    Not on file     Social History Narrative    No narrative on file     Family History   Problem Relation Age of Onset    Diabetes Mother     Heart disease Mother     Breast cancer Neg Hx     Colon cancer Neg Hx     Ovarian cancer Neg Hx     Cancer  "Neg Hx      OB History      Para Term  AB Living    3 3 3     3    SAB TAB Ectopic Multiple Live Births            3        Obstetric Comments    Menarche ~ 11          ROS:     GENERAL: Denies unintentional weight gain or weight loss. Feeling well overall.   SKIN: Denies rash or lesions.   HEENT: Denies headaches, or vision changes.   CARDIOVASCULAR: Denies palpitations or chest pain.   RESPIRATORY: Denies shortness of breath or dyspnea on exertion.  BREASTS: Denies pain, lumps, or nipple discharge.   ABDOMEN: Denies abdominal pain, constipation, diarrhea, nausea, vomiting, change in appetite.  URINARY: Denies frequency, dysuria, hematuria.  NEUROLOGIC: Denies syncope or weakness.   PSYCHIATRIC: Denies depression, anxiety or mood swings.  VULVAR: No pain; reports two lesions to labia; no itching.  VAGINAL: No relaxation, no itching, no abnormal bleeding and no lesions.    Physical Exam:      PHYSICAL EXAM:  /60   Ht 5' 5" (1.651 m)   Wt 53.2 kg (117 lb 4.6 oz)   LMP 2017   BMI 19.52 kg/m²   Body mass index is 19.52 kg/m².     APPEARANCE: Well nourished, well developed, in no acute distress.  PSYCH: Appropriate mood and affect.  CHEST: Normal respiratory effort.  ABDOMEN: Soft.  No tenderness or masses.    PELVIC: Two small skin-colored lesions noted to labia minora, above clitoris (see pic) No erythema, swelling or drainage noted.  Normal hair distribution.  Adequate perineal body, normal urethral meatus.  Vagina moist and well rugated without lesions or discharge.  Cervix pink, without lesions, discharge or tenderness.  No significant cystocele or rectocele.  Bimanual exam shows uterus to be normal size, regular, mobile and nontender.  Adnexa without masses or tenderness.                Assessment/Plan:     Vulval lesion  -     Tissue Specimen To Pathology, Obstetrics/Gynecology  -     Tissue Specimen To Pathology, Obstetrics/Gynecology      CC:  Follow-Up of chronic vulvar " "itching    (Bone pt)       Raiza Cherry is a 47 y.o. female@ presents for follow-up of chronic vulvar itching, after 4 week treatment regimen of Temovate cream.  She states that at this time, her intense itching has significantly improved, but that she does still have periods of itching that are bothersome.  At her previous visit, we discussed that if itching had not resolved with topical corticosteroids, that we would proceed with skin biopsy of vulva.  Patient agrees with this plan of care still at this time.    PHYSICAL EXAM:  /60   Ht 5' 5" (1.651 m)   Wt 53.2 kg (117 lb 4.6 oz)   LMP 08/30/2017   BMI 19.52 kg/m²   Body mass index is 19.52 kg/m².     ROS:  GENERAL: No fever, chills, fatigue or weight loss.  VULVAR: No pain, no lesions; REPORTS itching & irritation.  VAGINAL: No itching, no abnormal bleeding and no lesions.  CARDIOVASCULAR: No chest pain. No shortness of breath. No leg cramps.  NEUROLOGICAL: No headaches. No vision changes.    PHYSICAL EXAM:   External genitalia: 2 small bumps (whiich appear c/w HPV lesions) noted (see pic above); urethra within normal limits.   ---------------------------------------------------------------------  PROCEDURE NOTE:  Pre-Op Diagnosis: Vulvar Lesion Removal ( x 2 lesions)  Post-Op Diagnosis:  Vulvar Lesion Removal ( x 2 lesions)    PROCEDURE:  Punch biopsy of vulva x 2  Performing Provider:  MYCHAL Herrmann WHNP    PROCEDURE:         ~ Punch Biopsy (Size 3)  Risks and benefits discussed.  Verbal informed consent obtained.  The area surrounding the skin lesion was prepared and draped in the usual sterile manner.  The areas to be biopsied were cleansed with betadine swab and alcohol.  Local anesthesia obtained with infiltration of 1% mixture of lidocaine with epinephrine, total 3 mL used.  A 4 mm punch biopsy was taken from the two sites from labia majora, anterior to clitoral colbert; tissue placed in two separate formalin containers and sent for " histopathologic examination. Hemostasis was assured with Silver Nitrate stick.  Pressure dressing applied.  Patient given verbal post- procedure biopsy site care instructions, as well as return precautions.  The patient tolerated the procedure well without complications.       ~ Closure:  - Silver Nitrate for hemostasis    ASSESSMENT/PLAN:  Vulval lesion  -     Tissue Specimen To Pathology, Obstetrics/Gynecology  -     Tissue Specimen To Pathology, Obstetrics/Gynecology      FOLLOW-UP:          Patient given verbal post- procedure biopsy site care instructions, as well as return precautions.  The patient tolerated the procedure well without complications    Counseling:     Patient was counseled today on current ASCCP pap guidelines, the recommendation for yearly pelvic exams, healthy diet and exercise routines, annual mammograms and breast self awareness. She is to see her PCP for other health maintenance.     Return if symptoms worsen or fail to improve.

## 2017-12-14 ENCOUNTER — TELEPHONE (OUTPATIENT)
Dept: GASTROENTEROLOGY | Facility: CLINIC | Age: 47
End: 2017-12-14

## 2017-12-14 NOTE — TELEPHONE ENCOUNTER
----- Message from Yumiko Martin sent at 12/14/2017  4:06 PM CST -----  Contact: [phone]/ self   Patient requesting to speak with you regarding getting additional medication. Please advise.

## 2017-12-14 NOTE — TELEPHONE ENCOUNTER
Spoke with the patient in regards to her having some worsening symptoms, I informed the patient that the doctor is out of town until Monday. I informed the patient that if her pain worsens over the weekend to go to the ED. Patient verbally understands.

## 2017-12-18 DIAGNOSIS — R19.7 DIARRHEA, UNSPECIFIED TYPE: ICD-10-CM

## 2017-12-18 DIAGNOSIS — K51.919: Primary | ICD-10-CM

## 2017-12-18 DIAGNOSIS — K62.5 RECTAL BLEEDING: ICD-10-CM

## 2017-12-18 RX ORDER — MESALAMINE 4 G/60ML
4 SUSPENSION RECTAL NIGHTLY
Qty: 1800 ML | Refills: 5 | Status: SHIPPED | OUTPATIENT
Start: 2017-12-18 | End: 2018-01-17

## 2017-12-18 RX ORDER — MESALAMINE 1000 MG/1
1000 SUPPOSITORY RECTAL NIGHTLY
Qty: 30 SUPPOSITORY | Refills: 0 | Status: SHIPPED | OUTPATIENT
Start: 2017-12-18 | End: 2018-01-18 | Stop reason: SDUPTHER

## 2018-01-18 DIAGNOSIS — R19.7 DIARRHEA, UNSPECIFIED TYPE: ICD-10-CM

## 2018-01-18 DIAGNOSIS — K62.5 RECTAL BLEEDING: ICD-10-CM

## 2018-01-18 DIAGNOSIS — K51.919: ICD-10-CM

## 2018-01-18 RX ORDER — BUDESONIDE 3 MG/1
3 CAPSULE, COATED PELLETS ORAL 3 TIMES DAILY
Qty: 90 EACH | Refills: 3 | Status: SHIPPED | OUTPATIENT
Start: 2018-01-18 | End: 2019-06-12

## 2018-01-18 RX ORDER — MESALAMINE 1000 MG/1
SUPPOSITORY RECTAL
Qty: 30 SUPPOSITORY | Refills: 0 | Status: SHIPPED | OUTPATIENT
Start: 2018-01-18 | End: 2019-06-12

## 2018-04-16 ENCOUNTER — TELEPHONE (OUTPATIENT)
Dept: OBSTETRICS AND GYNECOLOGY | Facility: CLINIC | Age: 48
End: 2018-04-16

## 2018-04-16 NOTE — TELEPHONE ENCOUNTER
Dr. Robles-- pt states that she is experiencing painful periods and would like to discuss. Pt's # [phone]

## 2018-04-16 NOTE — TELEPHONE ENCOUNTER
Pt didn't have a period for 3 months (September-December).  Since December she has been having a regular period with severe PMS.   C/o breast swelling and tenderness, bloating etc.  She is asking if she should to take OCP?   She doesn't have a heavy or painful period but the PMS symptoms are bothering her.  Annual scheduled for 5/28

## 2018-04-17 RX ORDER — PROGESTERONE 100 MG/1
100 CAPSULE ORAL NIGHTLY
Qty: 30 CAPSULE | Refills: 11 | Status: SHIPPED | OUTPATIENT
Start: 2018-04-17 | End: 2018-06-24

## 2018-04-17 NOTE — TELEPHONE ENCOUNTER
Pt advised.  She would like to try Prometrium, she has her annual scheduled in ~6 weeks and will follow up then.      Medication pended

## 2018-04-17 NOTE — TELEPHONE ENCOUNTER
Lets try her taking Prometrium 100 mg nightly every night. This should help eith the bloating and PMS.  Lets scshed a f/u appt in about 2 months to make sure that she is feeling better.   I think Prometrium is better for her right now with her sx that a low dose OCP  If she agreeable can you please send Rx with 11 RF

## 2018-06-04 ENCOUNTER — OFFICE VISIT (OUTPATIENT)
Dept: OBSTETRICS AND GYNECOLOGY | Facility: CLINIC | Age: 48
End: 2018-06-04
Payer: COMMERCIAL

## 2018-06-04 VITALS — HEIGHT: 65 IN | BODY MASS INDEX: 19.1 KG/M2 | WEIGHT: 114.63 LBS

## 2018-06-04 DIAGNOSIS — N89.8 VAGINAL ODOR: ICD-10-CM

## 2018-06-04 DIAGNOSIS — R87.612 LOW GRADE SQUAMOUS INTRAEPITHELIAL LESION ON CYTOLOGIC SMEAR OF CERVIX (LGSIL): Primary | ICD-10-CM

## 2018-06-04 LAB
CANDIDA RRNA VAG QL PROBE: NEGATIVE
G VAGINALIS RRNA GENITAL QL PROBE: POSITIVE
T VAGINALIS RRNA GENITAL QL PROBE: NEGATIVE

## 2018-06-04 PROCEDURE — 88175 CYTOPATH C/V AUTO FLUID REDO: CPT | Performed by: PATHOLOGY

## 2018-06-04 PROCEDURE — 87480 CANDIDA DNA DIR PROBE: CPT

## 2018-06-04 PROCEDURE — 99999 PR PBB SHADOW E&M-EST. PATIENT-LVL III: CPT | Mod: PBBFAC,,, | Performed by: OBSTETRICS & GYNECOLOGY

## 2018-06-04 PROCEDURE — 99213 OFFICE O/P EST LOW 20 MIN: CPT | Mod: S$GLB,,, | Performed by: OBSTETRICS & GYNECOLOGY

## 2018-06-04 PROCEDURE — 87624 HPV HI-RISK TYP POOLED RSLT: CPT

## 2018-06-04 PROCEDURE — 87510 GARDNER VAG DNA DIR PROBE: CPT

## 2018-06-04 PROCEDURE — 3008F BODY MASS INDEX DOCD: CPT | Mod: CPTII,S$GLB,, | Performed by: OBSTETRICS & GYNECOLOGY

## 2018-06-04 PROCEDURE — 88141 CYTOPATH C/V INTERPRET: CPT | Mod: ,,, | Performed by: PATHOLOGY

## 2018-06-04 NOTE — PROGRESS NOTES
CC:Repeat pap    Raiza Cherry is a 48 y.o. female  .  She is established.    Last pap  LGSIL, then colpo  mildly abnl cells. C/o hot flashes nightly, called here & was told to start progesterone but hasnt yet,  C/o vaginal odor.     1. CERVIX, 12:00 (BIOPSY):  Low-grade squamous intraepithelial lesion (HEIDE-1/mild dysplasia, HPV effect)  2. CERVIX, 7:00 (BIOPSY):  Low-grade squamous intraepithelial lesion (HEIDE-1/mild dysplasia, HPV effect)  3. ENDOCERVIX (CURETTAGE):  Negative for dysplasia  Benign endocervical epithelium       Past Medical History:   Diagnosis Date    Abnormal Pap smear of cervix     LEEP ,  LGSIL, colpo  mildly abnl cells,     Glaucoma     Insomnia     Ulcerative colitis     proctitis       Past Surgical History:   Procedure Laterality Date    BREAST SURGERY      enlargement and lift of both breasts    CATARACT EXTRACTION      left eye    CERVICAL BIOPSY  W/ LOOP ELECTRODE EXCISION  2016    Moderate dysplasia completely excised margins clear and ECC negative       OB History    Para Term  AB Living   3 3 3     3   SAB TAB Ectopic Multiple Live Births           3      # Outcome Date GA Lbr Jordon/2nd Weight Sex Delivery Anes PTL Lv   3 Term 10/24/07 40w0d  3.204 kg (7 lb 1 oz) M Vag-Spont EPI  KM   2 Term 96 40w0d  3.43 kg (7 lb 9 oz) F Vag-Spont EPI  KM   1 Term 04/15/93 40w0d  3.09 kg (6 lb 13 oz) F Vag-Spont EPI  KM      Obstetric Comments   Menarche ~ 11       Family History   Problem Relation Age of Onset    Lung disease Father     Diabetes Mother     Heart disease Mother     Lupus Sister     No Known Problems Sister     No Known Problems Sister     No Known Problems Sister     Breast cancer Neg Hx     Colon cancer Neg Hx     Ovarian cancer Neg Hx     Cancer Neg Hx        Social History   Substance Use Topics    Smoking status: Never Smoker    Smokeless tobacco: Never Used    Alcohol use Yes       "Comment: Rarely       Ht 5' 5" (1.651 m)   Wt 52 kg (114 lb 10.2 oz)   LMP 04/13/2018   BMI 19.08 kg/m²     ROS:  GENERAL: Denies weight gain or weight loss. Feeling well overall.   SKIN: Denies rash or lesions.   HEAD: Denies head injury or headache.   NODES: Denies enlarged lymph nodes.   CHEST: Denies chest pain or shortness of breath.   CARDIOVASCULAR: Denies palpitations or left sided chest pain.   ABDOMEN: No abdominal pain, constipation, diarrhea, nausea, vomiting or rectal bleeding.   URINARY: No frequency, dysuria, hematuria, or burning on urination.    Physical Exam:  APPEARANCE: Well nourished, well developed, in no acute distress.  AFFECT: WNL, alert and oriented x 3  SKIN: No acne or hirsutism  NECK: Neck symmetric without masses or thyromegaly  NODES: No inguinal, cervical, axillary, or femoral lymph node enlargement  CHEST: Good respiratory effect  ABDOMEN: Soft.  No tenderness or masses.  No hepatosplenomegaly.  No hernias.  PELVIC: Normal external genitalia without lesions.  Normal hair distribution.  Adequate perineal body, normal urethral meatus.  Vagina moist and well rugated without lesions or discharge.  Cervix pink, without lesions, discharge or tenderness.  No significant cystocele or rectocele.  Bimanual exam shows uterus to be normal size, regular, mobile and nontender.  Adnexa without masses or tenderness.    EXTREMITIES: No edema.    ASSESSMENT AND PLAN    Raiza was seen today for repeat pap.    Diagnoses and all orders for this visit:    Low grade squamous intraepithelial lesion on cytologic smear of cervix (LGSIL)  -     HPV High Risk Genotypes, PCR  -     Liquid-based pap smear, screening    Vaginal odor  -     Vaginosis Screen by DNA Probe    pt to start Prometrium as prev discussed and she will email me in 6 weeks and let me know how she is doing.        "

## 2018-06-07 ENCOUNTER — TELEPHONE (OUTPATIENT)
Dept: OBSTETRICS AND GYNECOLOGY | Facility: CLINIC | Age: 48
End: 2018-06-07

## 2018-06-07 RX ORDER — METRONIDAZOLE 500 MG/1
500 TABLET ORAL 2 TIMES DAILY
Qty: 14 TABLET | Refills: 0 | Status: SHIPPED | OUTPATIENT
Start: 2018-06-07 | End: 2018-06-14

## 2018-06-07 NOTE — TELEPHONE ENCOUNTER
Affirm came back positive for BV.  Explained to her its not a STD.      Flagyl pended, she does not consume alcohol.    DISPLAY PLAN FREE TEXT

## 2018-06-07 NOTE — TELEPHONE ENCOUNTER
Dr. Robles-- pt states that she got her vaginosis screening results via the portal and would like to know what she should be doing. Pt's # [phone]

## 2018-06-07 NOTE — TELEPHONE ENCOUNTER
Thank you for talking to her   They came back yesterday and I did not have a chance to call her yet  So TY

## 2018-06-09 LAB
HPV16 AG SPEC QL: NEGATIVE
HPV16+18+H RISK 12 DNA CVX-IMP: NEGATIVE
HPV18 DNA SPEC QL NAA+PROBE: NEGATIVE

## 2018-06-12 NOTE — PROGRESS NOTES
Roberto Eastman,    I have received the results of your pap and your HPV test.  Your Pap smear has returned with atypical cells of undetermined significance, but your HPV test is NEGATIVE/normal..  This means that you do NOT have the virus that can cause cervical cancer nor abnormal cells on your cervix.  Therefore this is considered a NORMAL Pap smear.  I recommend screening again in 1 year.  If you have any questions don't hesitate to ask, but I am sure you this is a completely normal pap and we will just repeat it in 1 year.    Take care,  Dr Robles

## 2018-06-20 ENCOUNTER — TELEPHONE (OUTPATIENT)
Dept: OBSTETRICS AND GYNECOLOGY | Facility: CLINIC | Age: 48
End: 2018-06-20

## 2018-06-20 RX ORDER — METRONIDAZOLE 7.5 MG/G
1 GEL VAGINAL 2 TIMES DAILY
Qty: 70 G | Refills: 0 | Status: SHIPPED | OUTPATIENT
Start: 2018-06-20 | End: 2018-12-17

## 2018-06-20 NOTE — TELEPHONE ENCOUNTER
"Pt was ERX Flagyl on 6/7.  She took 3 days then stopped due to it "messing up her stomach".  The symptoms of BV subsided with 3 days of antibiotic.  Pt is requesting metrogel if she needs longer treatment.    "

## 2018-06-20 NOTE — TELEPHONE ENCOUNTER
Bone pt - pt spoke with Aminta on 6/7 and was prescribed Flagyl for BV. Pt said the pills are messing up her stomach so she would like to see if she can get the cream sent into the pharm.  Rx - [id number]

## 2018-06-22 ENCOUNTER — TELEPHONE (OUTPATIENT)
Dept: OBSTETRICS AND GYNECOLOGY | Facility: CLINIC | Age: 48
End: 2018-06-22

## 2018-06-22 ENCOUNTER — TELEPHONE (OUTPATIENT)
Dept: GASTROENTEROLOGY | Facility: CLINIC | Age: 48
End: 2018-06-22

## 2018-06-22 NOTE — TELEPHONE ENCOUNTER
Pt is on Progesterone 100mg for perimenopausal symptoms x1 month and doesn't think its helping.  She can fall asleep but wakes frequently and still hot; she used to be cold natured.  Had hot flash at work the other day.

## 2018-06-22 NOTE — TELEPHONE ENCOUNTER
----- Message from Sheila Celeste sent at 6/22/2018 11:07 AM CDT -----  Contact: self, [phone]  Patient requests to speak with you regarding budesonide medication prescribed being too expensive and needing an alternate medication prescribed. Please advise.

## 2018-06-22 NOTE — TELEPHONE ENCOUNTER
Increase to 200mg nightly   Give it 3-4 weeks and give me an update.   I wanted to start low knowing we can and would need to go higher.

## 2018-06-22 NOTE — TELEPHONE ENCOUNTER
Dr Robles pt calling, pt is on Progesterone and she feels it isn't helping at all. Pt # [phone]

## 2018-06-24 RX ORDER — PROGESTERONE 200 MG/1
200 CAPSULE ORAL NIGHTLY
Qty: 30 CAPSULE | Refills: 11 | Status: SHIPPED | OUTPATIENT
Start: 2018-06-24 | End: 2018-12-17

## 2018-06-26 ENCOUNTER — TELEPHONE (OUTPATIENT)
Dept: GASTROENTEROLOGY | Facility: CLINIC | Age: 48
End: 2018-06-26

## 2018-06-26 NOTE — TELEPHONE ENCOUNTER
----- Message from Na Melendez sent at 6/26/2018 11:35 AM CDT -----  Contact: [phone]/self  Patient is requesting a call back regarding the medication she needs. Please advise.

## 2018-06-28 ENCOUNTER — TELEPHONE (OUTPATIENT)
Dept: GASTROENTEROLOGY | Facility: CLINIC | Age: 48
End: 2018-06-28

## 2018-06-28 DIAGNOSIS — K51.919: Primary | ICD-10-CM

## 2018-06-28 DIAGNOSIS — R19.7 DIARRHEA, UNSPECIFIED TYPE: ICD-10-CM

## 2018-06-28 RX ORDER — MESALAMINE 4 G/60ML
4 SUSPENSION RECTAL NIGHTLY
Qty: 1800 ML | Refills: 2 | Status: SHIPPED | OUTPATIENT
Start: 2018-06-28 | End: 2018-12-17

## 2018-06-28 RX ORDER — MESALAMINE 800 MG/1
1600 TABLET, DELAYED RELEASE ORAL 3 TIMES DAILY
Qty: 180 TABLET | Refills: 4 | Status: SHIPPED | OUTPATIENT
Start: 2018-06-28 | End: 2018-06-29 | Stop reason: CLARIF

## 2018-06-28 NOTE — TELEPHONE ENCOUNTER
----- Message from Jose Ponce MA sent at 6/27/2018  1:43 PM CDT -----  Contact: [phone] /self      ----- Message -----  From: Na Melendez  Sent: 6/27/2018  12:38 PM  To: Denisa Pierre Staff (Reina)    Patient called in returning your call. Please advise.

## 2018-06-28 NOTE — TELEPHONE ENCOUNTER
Patient states that there is a lot of inflammation. patient symptoms are having a hard time controlling going to the bathroom, Rectal bleeding, diarrhea , and abnormal cramping. Patient states that she leaving to go out of town this weekend. Patient also stated that she has not had a Colonoscopy in the past 3 years. Patient stated she has a lot of things going on at this time.

## 2018-06-29 ENCOUNTER — TELEPHONE (OUTPATIENT)
Dept: GASTROENTEROLOGY | Facility: CLINIC | Age: 48
End: 2018-06-29

## 2018-06-29 DIAGNOSIS — K51.919: Primary | ICD-10-CM

## 2018-06-29 RX ORDER — MESALAMINE 1.2 G/1
2.4 TABLET, DELAYED RELEASE ORAL ONCE
Qty: 60 TABLET | Refills: 3 | Status: SHIPPED | OUTPATIENT
Start: 2018-06-29 | End: 2018-06-29

## 2018-06-29 NOTE — TELEPHONE ENCOUNTER
----- Message from Laurie Gonzalez sent at 6/29/2018 11:22 AM CDT -----  Contact: [phone]/self  Pt requesting to speak with you concerning clarification on her prescriptions.  Pt states the pharmacy doesn't have any of her prescriptions ready.   Please call and advise

## 2018-06-29 NOTE — TELEPHONE ENCOUNTER
Spoke with the patient's pharmacy in regards to her medication. The pharmacist stated that the Mesalamine is not covered under the patient's insurance. The medications that is covered is Apriso, Lialda, and Balsalazide. I informed the patient that I would speak with Dr. Crawley in regards to changing the medication.

## 2018-09-14 ENCOUNTER — TELEPHONE (OUTPATIENT)
Dept: GASTROENTEROLOGY | Facility: CLINIC | Age: 48
End: 2018-09-14

## 2018-09-14 NOTE — TELEPHONE ENCOUNTER
Spoke with patient about scheduling a Colonoscopy. Patient stated that she wants to check her schedule first and will give office a call back whenever she has a good date in mind.

## 2018-09-14 NOTE — TELEPHONE ENCOUNTER
----- Message from Ledy Patricia sent at 9/13/2018  2:40 PM CDT -----  Contact: [phone]/ self   Pt its requesting to schedule a colonoscopy . Please advise

## 2018-09-18 ENCOUNTER — TELEPHONE (OUTPATIENT)
Dept: GASTROENTEROLOGY | Facility: CLINIC | Age: 48
End: 2018-09-18

## 2018-09-18 NOTE — TELEPHONE ENCOUNTER
----- Message from Mirta Dhaliwal sent at 9/12/2018 12:56 PM CDT -----  Contact: Self/ [phone]  Patient called in to schedule a colonoscopy appointment. She was Dr. Crawley's patient. Patient is looking for female doctor.    Please call.

## 2018-12-17 ENCOUNTER — OFFICE VISIT (OUTPATIENT)
Dept: OBSTETRICS AND GYNECOLOGY | Facility: CLINIC | Age: 48
End: 2018-12-17
Payer: COMMERCIAL

## 2018-12-17 VITALS
SYSTOLIC BLOOD PRESSURE: 114 MMHG | HEIGHT: 65 IN | WEIGHT: 114.63 LBS | DIASTOLIC BLOOD PRESSURE: 70 MMHG | BODY MASS INDEX: 19.1 KG/M2

## 2018-12-17 DIAGNOSIS — Z12.31 ENCOUNTER FOR SCREENING MAMMOGRAM FOR BREAST CANCER: ICD-10-CM

## 2018-12-17 DIAGNOSIS — Z11.51 ENCOUNTER FOR SCREENING FOR HUMAN PAPILLOMAVIRUS (HPV): ICD-10-CM

## 2018-12-17 DIAGNOSIS — Z01.419 ENCOUNTER FOR GYNECOLOGICAL EXAMINATION: Primary | ICD-10-CM

## 2018-12-17 DIAGNOSIS — Z12.4 ENCOUNTER FOR PAPANICOLAOU SMEAR FOR CERVICAL CANCER SCREENING: ICD-10-CM

## 2018-12-17 PROCEDURE — 88175 CYTOPATH C/V AUTO FLUID REDO: CPT | Performed by: PATHOLOGY

## 2018-12-17 PROCEDURE — 99396 PREV VISIT EST AGE 40-64: CPT | Mod: S$GLB,,, | Performed by: OBSTETRICS & GYNECOLOGY

## 2018-12-17 PROCEDURE — 99999 PR PBB SHADOW E&M-EST. PATIENT-LVL III: CPT | Mod: PBBFAC,,, | Performed by: OBSTETRICS & GYNECOLOGY

## 2018-12-17 PROCEDURE — 87624 HPV HI-RISK TYP POOLED RSLT: CPT

## 2018-12-17 PROCEDURE — 88141 CYTOPATH C/V INTERPRET: CPT | Mod: ,,, | Performed by: PATHOLOGY

## 2018-12-17 NOTE — PROGRESS NOTES
Chief Complaint Well woman exam:  Well Woman (Annual Exam, last pap/hpv  ASCUS hpv negative, Last mammo  birads 2 DIS. Implant deflated, having sx this Thursday with Rose)      Raiza Cherry is a 48 y.o. female  presents for a well woman exam.    She is established Having implants exchanged this thurs with Dr Villalta     Review of Systems - Negative except occ hot flashes but much better this year:   No abdominal pain, No vaginal discharge, Off all hormones(prometrium) and doing well - feeling fine  Has been exercising    No breast pain or masses, No rectal bleeding  LMP 2018 still with occ cycles        LMP: Patient's last menstrual period was 2018.  Last pap: 2018  ASCUS hpv neg  Last MM/18 birads 2 DIS  Last colonoscopy: Oct 2018 Neg       Medication List           Accurate as of 18  9:50 AM. If you have any questions, ask your nurse or doctor.               CHANGE how you take these medications    CANASA 1000 MG Supp  Generic drug:  mesalamine  UNWRA P AND PLACE 1 SUPPOSITORY (1,000 MG TOTAL) RECTALLY NIGHTLY.  What changed:  Another medication with the same name was removed. Continue taking this medication, and follow the directions you see here.  Changed by:  Estuardo Robles MD        CONTINUE taking these medications    budesonide 3 mg capsule  Commonly known as:  ENTOCORT EC  TAKE 1 CAPSULE (3 MG TOTAL) BY MOUTH 3 (THREE) TIMES DAILY.     HAIR-SKIN-NAILS (VIT C-BIOTIN) 50 mg -1,250 mcg Chew  Generic drug:  vitamin C-biotin     ONE-A-DAY WOMENS FORMULA ORAL     SIMBRINZA 1-0.2 % Drps  Generic drug:  brinzolamide-brimonidine     timolol maleate 0.5% 0.5 % Drop  Commonly known as:  TIMOPTIC     VITAMIN C 100 MG tablet  Generic drug:  ascorbic acid (vitamin C)        STOP taking these medications    metroNIDAZOLE 0.75 % vaginal gel  Commonly known as:  METROGEL  Stopped by:  Estuardo Rboles MD     progesterone 200 MG capsule  Commonly known as:   PROMETRIUM  Stopped by:  Estuardo Robles MD     SOOLANTRA 1 % Crea  Generic drug:  ivermectin  Stopped by:  Estuardo Robles MD            Past Medical History:   Diagnosis Date    Abnormal Pap smear of cervix     LEEP 2016,  LGSIL, colpo  mildly abnl cells,     Glaucoma     Insomnia     Ulcerative colitis     proctitis       Past Surgical History:   Procedure Laterality Date    BREAST SURGERY      enlargement and lift of both breasts    CATARACT EXTRACTION      left eye    CERVICAL BIOPSY  W/ LOOP ELECTRODE EXCISION  2016    Moderate dysplasia completely excised margins clear and ECC negative       OB History    Para Term  AB Living   3 3 3     3   SAB TAB Ectopic Multiple Live Births           3      # Outcome Date GA Lbr Jordon/2nd Weight Sex Delivery Anes PTL Lv   3 Term 10/24/07 40w0d  3.204 kg (7 lb 1 oz) M Vag-Spont EPI  KM   2 Term 96 40w0d  3.43 kg (7 lb 9 oz) F Vag-Spont EPI  KM   1 Term 04/15/93 40w0d  3.09 kg (6 lb 13 oz) F Vag-Spont EPI  KM      Obstetric Comments   Menarche ~ 11       Family History   Problem Relation Age of Onset    Lung disease Father     Diabetes Mother     Heart disease Mother     Lupus Sister     No Known Problems Sister     No Known Problems Sister     No Known Problems Sister     Breast cancer Neg Hx     Colon cancer Neg Hx     Ovarian cancer Neg Hx     Cancer Neg Hx        Social History     Tobacco Use    Smoking status: Never Smoker    Smokeless tobacco: Never Used   Substance Use Topics    Alcohol use: Yes     Comment: Rarely    Drug use: No         ROS:    GENERAL: Denies weight gain or weight loss. Feeling well overall.   SKIN: Denies rash or lesions.   HEENT: Denies headaches, or vision changes.   CARDIOVASCULAR: Denies palpitations or left sided chest pain.   RESPIRATORY: Denies shortness of breath or dyspnea on exertion.  BREASTS: Denies pain, lumps, or nipple discharge.   ABDOMEN: Denies abdominal pain,  "constipation, diarrhea, nausea, vomiting, change in appetite or rectal bleeding.   URINARY: Denies frequency, dysuria, hematuria.  NEUROLOGIC: Denies syncope or weakness.   PSYCHIATRIC: Denies depression, anxiety or mood swings.    Physical Exam:  /70   Ht 5' 5" (1.651 m)   Wt 52 kg (114 lb 10.2 oz)   LMP 04/06/2018   BMI 19.08 kg/m²     APPEARANCE: Well nourished, well developed, in no acute distress.  AFFECT: WNL, alert and oriented x 3  SKIN: No acne or hirsutism  BREASTS: Symmetrical, no skin changes. Deflated right breast implant                    No nipple discharge. No palpable masses bilaterally  NODES: No inguinal nor axillary LAD  ABDOMEN: soft Non tender Non distended No masses  PELVIC:   Normal external genitalia without lesions.   Normal urethral meatus. No signif cystocele or rectocele.  Vagina atrophic without lesions or discharge.    Cervix atrophic, without lesions, discharge or tenderness.    Bimanual exam shows uterus to be normal size, regular, mobile and nontender.  Adnexa without masses or tenderness.    EXTREMITIES: No edema.    ASSESSMENT AND PLAN    Raiza was seen today for well woman.    Diagnoses and all orders for this visit:    Encounter for gynecological examination    Encounter for screening for human papillomavirus (HPV)  -     HPV High Risk Genotypes, PCR    Encounter for Papanicolaou smear for cervical cancer screening  -     Liquid-based pap smear, screening        Patient was counseled today on A.C.S. Pap guidelines and recommendations for yearly pelvic exams, mammograms and monthly self breast exams; to see her PCP for other health maintenance.     Patient encouraged to register for portal and results will be sent via portal.    Follow-up in about 1 year (around 12/17/2019).         Health Maintenance   Topic Date Due    Lipid Panel  1968    TETANUS VACCINE  01/17/1986    Colonoscopy  01/17/2018    Influenza Vaccine  08/01/2018    Mammogram  02/03/2019    " Pap Smear with HPV Cotest  06/04/2021

## 2018-12-21 LAB
HPV HR 12 DNA CVX QL NAA+PROBE: NEGATIVE
HPV16 AG SPEC QL: NEGATIVE
HPV18 DNA SPEC QL NAA+PROBE: NEGATIVE

## 2019-01-02 NOTE — PROGRESS NOTES
Spoke to patient.  Results given.  Low-grade LÁZARO Pap but HPV negative.  Patient is able to come in on January 14th Monday morning at 8:15 a.m. for colposcopy.  Can you please put her on the schedule.

## 2019-01-14 ENCOUNTER — PROCEDURE VISIT (OUTPATIENT)
Dept: OBSTETRICS AND GYNECOLOGY | Facility: CLINIC | Age: 49
End: 2019-01-14
Payer: COMMERCIAL

## 2019-01-14 VITALS — HEIGHT: 65 IN | BODY MASS INDEX: 19.83 KG/M2 | WEIGHT: 119.06 LBS

## 2019-01-14 DIAGNOSIS — R87.612 LOW GRADE SQUAMOUS INTRAEPITHELIAL LESION (LGSIL) ON CERVICAL PAP SMEAR: Primary | ICD-10-CM

## 2019-01-14 PROCEDURE — 88305 TISSUE SPECIMEN TO PATHOLOGY, OBSTETRICS/GYNECOLOGY: ICD-10-PCS | Mod: 26,,, | Performed by: PATHOLOGY

## 2019-01-14 PROCEDURE — 57456 ENDOCERV CURETTAGE W/SCOPE: CPT | Mod: S$GLB,,, | Performed by: OBSTETRICS & GYNECOLOGY

## 2019-01-14 PROCEDURE — 88305 TISSUE EXAM BY PATHOLOGIST: CPT | Performed by: PATHOLOGY

## 2019-01-14 PROCEDURE — 88305 TISSUE EXAM BY PATHOLOGIST: CPT | Mod: 26,,, | Performed by: PATHOLOGY

## 2019-01-14 PROCEDURE — 57456: ICD-10-PCS | Mod: S$GLB,,, | Performed by: OBSTETRICS & GYNECOLOGY

## 2019-01-14 NOTE — PROCEDURES
Colposcopy  pap LGSIL but Hpv neg  Date/Time: 1/14/2019 8:50 AM  Performed by: Estuardo Robles MD  Authorized by: Estuardo Robles MD     Consent Done?:  Yes (Written)  Assistants?: Yes    List of assistants:  Karolyn PERRY was present for the entire procedure.    Colposcopy Site:  Cervix  Position:  Supine   Patient was prepped and draped in the normal sterile fashion.  Acrowhite Lesion: No    Atypical Vessels: No    Transformation Zone Adequate?: No    Biopsy?: No    ECC Performed?: Yes    LEEP Performed?: No     Patient tolerated the procedure well with no immediate complications.   Post-operative instructions were provided for the patient.   Patient was discharged and will follow up if any complications occur    pap LGSIL Hpv neg  Colpo no AWFL but TZ not well seen as up inside cx  No Bx taken   ECC done  F/u pap in 6 mon if ecc neg

## 2019-01-23 NOTE — PROGRESS NOTES
Raiza,  Good news!  Your endocervical biopsy was normal. No abnormal cells, no dysplasia, no cancer!  We will plan to repeat her Pap in 6 months as scheduled.  Take care,    Dr Robles

## 2019-06-12 ENCOUNTER — LAB VISIT (OUTPATIENT)
Dept: LAB | Facility: HOSPITAL | Age: 49
End: 2019-06-12
Attending: INTERNAL MEDICINE
Payer: COMMERCIAL

## 2019-06-12 ENCOUNTER — OFFICE VISIT (OUTPATIENT)
Dept: GASTROENTEROLOGY | Facility: CLINIC | Age: 49
End: 2019-06-12
Payer: COMMERCIAL

## 2019-06-12 DIAGNOSIS — R19.7 DIARRHEA, UNSPECIFIED TYPE: ICD-10-CM

## 2019-06-12 DIAGNOSIS — K62.5 RECTAL BLEEDING: ICD-10-CM

## 2019-06-12 DIAGNOSIS — R15.9 INCONTINENCE OF FECES WITH FECAL URGENCY: ICD-10-CM

## 2019-06-12 DIAGNOSIS — R15.2 INCONTINENCE OF FECES WITH FECAL URGENCY: ICD-10-CM

## 2019-06-12 DIAGNOSIS — R19.8 TENESMUS: ICD-10-CM

## 2019-06-12 DIAGNOSIS — K51.311 ULCERATIVE RECTOSIGMOIDITIS WITH RECTAL BLEEDING: ICD-10-CM

## 2019-06-12 DIAGNOSIS — K51.919: ICD-10-CM

## 2019-06-12 DIAGNOSIS — R10.84 GENERALIZED ABDOMINAL PAIN: ICD-10-CM

## 2019-06-12 DIAGNOSIS — K51.311 ULCERATIVE RECTOSIGMOIDITIS WITH RECTAL BLEEDING: Primary | ICD-10-CM

## 2019-06-12 LAB
ALBUMIN SERPL BCP-MCNC: 4.4 G/DL (ref 3.5–5.2)
ALP SERPL-CCNC: 93 U/L (ref 55–135)
ALT SERPL W/O P-5'-P-CCNC: 16 U/L (ref 10–44)
ANION GAP SERPL CALC-SCNC: 14 MMOL/L (ref 8–16)
AST SERPL-CCNC: 18 U/L (ref 10–40)
BASOPHILS # BLD AUTO: 0.07 K/UL (ref 0–0.2)
BASOPHILS NFR BLD: 1.1 % (ref 0–1.9)
BILIRUB SERPL-MCNC: 0.5 MG/DL (ref 0.1–1)
BUN SERPL-MCNC: 11 MG/DL (ref 6–20)
CALCIUM SERPL-MCNC: 10.1 MG/DL (ref 8.7–10.5)
CHLORIDE SERPL-SCNC: 104 MMOL/L (ref 95–110)
CO2 SERPL-SCNC: 25 MMOL/L (ref 23–29)
CREAT SERPL-MCNC: 0.7 MG/DL (ref 0.5–1.4)
CRP SERPL-MCNC: 5.7 MG/L (ref 0–8.2)
DIFFERENTIAL METHOD: ABNORMAL
EOSINOPHIL # BLD AUTO: 0.1 K/UL (ref 0–0.5)
EOSINOPHIL NFR BLD: 1.5 % (ref 0–8)
ERYTHROCYTE [DISTWIDTH] IN BLOOD BY AUTOMATED COUNT: 12.3 % (ref 11.5–14.5)
ERYTHROCYTE [SEDIMENTATION RATE] IN BLOOD BY WESTERGREN METHOD: 6 MM/HR (ref 0–36)
EST. GFR  (AFRICAN AMERICAN): >60 ML/MIN/1.73 M^2
EST. GFR  (NON AFRICAN AMERICAN): >60 ML/MIN/1.73 M^2
FERRITIN SERPL-MCNC: 53 NG/ML (ref 20–300)
GLUCOSE SERPL-MCNC: 75 MG/DL (ref 70–110)
HCT VFR BLD AUTO: 43.1 % (ref 37–48.5)
HGB BLD-MCNC: 14 G/DL (ref 12–16)
IMM GRANULOCYTES # BLD AUTO: 0.01 K/UL (ref 0–0.04)
IMM GRANULOCYTES NFR BLD AUTO: 0.2 % (ref 0–0.5)
IRON SERPL-MCNC: 97 UG/DL (ref 30–160)
LYMPHOCYTES # BLD AUTO: 2.8 K/UL (ref 1–4.8)
LYMPHOCYTES NFR BLD: 42.7 % (ref 18–48)
MCH RBC QN AUTO: 31.8 PG (ref 27–31)
MCHC RBC AUTO-ENTMCNC: 32.5 G/DL (ref 32–36)
MCV RBC AUTO: 98 FL (ref 82–98)
MONOCYTES # BLD AUTO: 0.5 K/UL (ref 0.3–1)
MONOCYTES NFR BLD: 7.7 % (ref 4–15)
NEUTROPHILS # BLD AUTO: 3.1 K/UL (ref 1.8–7.7)
NEUTROPHILS NFR BLD: 46.8 % (ref 38–73)
NRBC BLD-RTO: 0 /100 WBC
PLATELET # BLD AUTO: 424 K/UL (ref 150–350)
PMV BLD AUTO: 9.5 FL (ref 9.2–12.9)
POTASSIUM SERPL-SCNC: 3.7 MMOL/L (ref 3.5–5.1)
PROT SERPL-MCNC: 8.5 G/DL (ref 6–8.4)
RBC # BLD AUTO: 4.4 M/UL (ref 4–5.4)
SATURATED IRON: 23 % (ref 20–50)
SODIUM SERPL-SCNC: 143 MMOL/L (ref 136–145)
TOTAL IRON BINDING CAPACITY: 413 UG/DL (ref 250–450)
TRANSFERRIN SERPL-MCNC: 279 MG/DL (ref 200–375)
TSH SERPL DL<=0.005 MIU/L-ACNC: 1.64 UIU/ML (ref 0.4–4)
WBC # BLD AUTO: 6.51 K/UL (ref 3.9–12.7)

## 2019-06-12 PROCEDURE — 99999 PR PBB SHADOW E&M-EST. PATIENT-LVL II: ICD-10-PCS | Mod: PBBFAC,,, | Performed by: INTERNAL MEDICINE

## 2019-06-12 PROCEDURE — 36415 COLL VENOUS BLD VENIPUNCTURE: CPT

## 2019-06-12 PROCEDURE — 84443 ASSAY THYROID STIM HORMONE: CPT

## 2019-06-12 PROCEDURE — 85025 COMPLETE CBC W/AUTO DIFF WBC: CPT

## 2019-06-12 PROCEDURE — 85652 RBC SED RATE AUTOMATED: CPT

## 2019-06-12 PROCEDURE — 86140 C-REACTIVE PROTEIN: CPT

## 2019-06-12 PROCEDURE — 83516 IMMUNOASSAY NONANTIBODY: CPT | Mod: 59

## 2019-06-12 PROCEDURE — 83540 ASSAY OF IRON: CPT

## 2019-06-12 PROCEDURE — 80053 COMPREHEN METABOLIC PANEL: CPT

## 2019-06-12 PROCEDURE — 99215 PR OFFICE/OUTPT VISIT, EST, LEVL V, 40-54 MIN: ICD-10-PCS | Mod: S$GLB,,, | Performed by: INTERNAL MEDICINE

## 2019-06-12 PROCEDURE — 99215 OFFICE O/P EST HI 40 MIN: CPT | Mod: S$GLB,,, | Performed by: INTERNAL MEDICINE

## 2019-06-12 PROCEDURE — 82728 ASSAY OF FERRITIN: CPT

## 2019-06-12 PROCEDURE — 99999 PR PBB SHADOW E&M-EST. PATIENT-LVL II: CPT | Mod: PBBFAC,,, | Performed by: INTERNAL MEDICINE

## 2019-06-12 RX ORDER — MESALAMINE 1000 MG/1
1000 SUPPOSITORY RECTAL 2 TIMES DAILY
Qty: 84 SUPPOSITORY | Refills: 0 | Status: SHIPPED | OUTPATIENT
Start: 2019-06-12 | End: 2019-11-05 | Stop reason: SDUPTHER

## 2019-06-12 RX ORDER — MESALAMINE 4 G/60ML
4 SUSPENSION RECTAL NIGHTLY
Qty: 1800 ML | Refills: 1 | Status: SHIPPED | OUTPATIENT
Start: 2019-06-12 | End: 2019-11-05 | Stop reason: SDUPTHER

## 2019-06-13 LAB
GLIADIN PEPTIDE IGA SER-ACNC: 7 UNITS
GLIADIN PEPTIDE IGG SER-ACNC: 3 UNITS
IGA SERPL-MCNC: 241 MG/DL (ref 70–400)
TTG IGA SER-ACNC: 5 UNITS
TTG IGG SER-ACNC: 3 UNITS

## 2019-06-26 ENCOUNTER — TELEPHONE (OUTPATIENT)
Dept: GASTROENTEROLOGY | Facility: CLINIC | Age: 49
End: 2019-06-26

## 2019-06-26 NOTE — TELEPHONE ENCOUNTER
----- Message from Gonzalo Gil MD sent at 6/24/2019 10:23 AM CDT -----  Blood tests were all okay.  Blood counts and chemistry panel normal.  No signs of Celiac disease.  She still needs to submit her stool samples.

## 2019-06-26 NOTE — TELEPHONE ENCOUNTER
Attempted to contact patient with test results, but she did not answer. Left voicemail for patient to return a call to our clinic.

## 2019-07-11 ENCOUNTER — OFFICE VISIT (OUTPATIENT)
Dept: OBSTETRICS AND GYNECOLOGY | Facility: CLINIC | Age: 49
End: 2019-07-11
Payer: COMMERCIAL

## 2019-07-11 VITALS
WEIGHT: 112.44 LBS | DIASTOLIC BLOOD PRESSURE: 80 MMHG | BODY MASS INDEX: 18.73 KG/M2 | HEIGHT: 65 IN | SYSTOLIC BLOOD PRESSURE: 124 MMHG

## 2019-07-11 DIAGNOSIS — B96.89 BV (BACTERIAL VAGINOSIS): Primary | ICD-10-CM

## 2019-07-11 DIAGNOSIS — N94.10 DYSPAREUNIA IN FEMALE: ICD-10-CM

## 2019-07-11 DIAGNOSIS — N76.0 BV (BACTERIAL VAGINOSIS): Primary | ICD-10-CM

## 2019-07-11 DIAGNOSIS — N89.8 VAGINAL DRYNESS: ICD-10-CM

## 2019-07-11 DIAGNOSIS — Z01.419 ENCOUNTER FOR GYNECOLOGICAL EXAMINATION: ICD-10-CM

## 2019-07-11 DIAGNOSIS — N89.8 VAGINAL ODOR: ICD-10-CM

## 2019-07-11 LAB
BACTERIA HYPHAE, POC: POSITIVE
GARDNERELLA VAGINALIS: NEGATIVE
OTHER MICROSC. OBSERVATIONS: ABNORMAL
POC BACTERIAL VAGINOSIS: NEGATIVE
POC CLUE CELLS: POSITIVE
TRICHOMONAS, POC: NEGATIVE
YEAST WET PREP: NEGATIVE
YEAST, POC: NEGATIVE

## 2019-07-11 PROCEDURE — 99213 PR OFFICE/OUTPT VISIT, EST, LEVL III, 20-29 MIN: ICD-10-PCS | Mod: 25,S$GLB,, | Performed by: OBSTETRICS & GYNECOLOGY

## 2019-07-11 PROCEDURE — 3008F PR BODY MASS INDEX (BMI) DOCUMENTED: ICD-10-PCS | Mod: CPTII,S$GLB,, | Performed by: OBSTETRICS & GYNECOLOGY

## 2019-07-11 PROCEDURE — 87624 HPV HI-RISK TYP POOLED RSLT: CPT

## 2019-07-11 PROCEDURE — 3008F BODY MASS INDEX DOCD: CPT | Mod: CPTII,S$GLB,, | Performed by: OBSTETRICS & GYNECOLOGY

## 2019-07-11 PROCEDURE — 87210 POCT WET PREP: ICD-10-PCS | Mod: QW,S$GLB,, | Performed by: OBSTETRICS & GYNECOLOGY

## 2019-07-11 PROCEDURE — 87210 SMEAR WET MOUNT SALINE/INK: CPT | Mod: QW,S$GLB,, | Performed by: OBSTETRICS & GYNECOLOGY

## 2019-07-11 PROCEDURE — 87220 TISSUE EXAM FOR FUNGI: CPT | Mod: S$GLB,,, | Performed by: OBSTETRICS & GYNECOLOGY

## 2019-07-11 PROCEDURE — 87220 POCT KOH: ICD-10-PCS | Mod: S$GLB,,, | Performed by: OBSTETRICS & GYNECOLOGY

## 2019-07-11 PROCEDURE — 99999 PR PBB SHADOW E&M-EST. PATIENT-LVL III: CPT | Mod: PBBFAC,,, | Performed by: OBSTETRICS & GYNECOLOGY

## 2019-07-11 PROCEDURE — 99213 OFFICE O/P EST LOW 20 MIN: CPT | Mod: 25,S$GLB,, | Performed by: OBSTETRICS & GYNECOLOGY

## 2019-07-11 PROCEDURE — 99999 PR PBB SHADOW E&M-EST. PATIENT-LVL III: ICD-10-PCS | Mod: PBBFAC,,, | Performed by: OBSTETRICS & GYNECOLOGY

## 2019-07-11 PROCEDURE — 88175 CYTOPATH C/V AUTO FLUID REDO: CPT

## 2019-07-11 RX ORDER — BRINZOLAMIDE 10 MG/ML
1 SUSPENSION/ DROPS OPHTHALMIC 2 TIMES DAILY
Refills: 3 | COMMUNITY
Start: 2019-06-13 | End: 2020-07-27

## 2019-07-11 RX ORDER — METRONIDAZOLE 7.5 MG/G
1 GEL VAGINAL DAILY
Qty: 70 G | Refills: 0 | Status: SHIPPED | OUTPATIENT
Start: 2019-07-11 | End: 2019-07-16

## 2019-07-11 RX ORDER — BRIMONIDINE TARTRATE 1 MG/ML
1 SOLUTION/ DROPS OPHTHALMIC 2 TIMES DAILY
Refills: 3 | COMMUNITY
Start: 2019-06-13 | End: 2020-07-27

## 2019-07-11 NOTE — PROGRESS NOTES
Subjective:       Patient ID: Raiza Cherry is a 49 y.o. female.    Chief Complaint:  Repeat pap (Last pap  LGSIL, hpv negative. Last colposcopy  normal. C/o vaginal odor & spotting occ.)      History of Present Illness  49 y/s here for repeat pap   2019 colposcopy negative  S/p Leep   Patient reports Vag odor x 2 months  Postcoital spotting  last month  Also reports vaginal dryness and discomfort with intercourse    GYN & OB History  No LMP recorded. Patient is perimenopausal.   Date of Last Pap: 2019    OB History    Para Term  AB Living   3 3 3     3   SAB TAB Ectopic Multiple Live Births           3      # Outcome Date GA Lbr Jordon/2nd Weight Sex Delivery Anes PTL Lv   3 Term 10/24/07 40w0d  3.204 kg (7 lb 1 oz) M Vag-Spont EPI  KM   2 Term 96 40w0d  3.43 kg (7 lb 9 oz) F Vag-Spont EPI  KM   1 Term 04/15/93 40w0d  3.09 kg (6 lb 13 oz) F Vag-Spont EPI  KM      Obstetric Comments   Menarche ~ 11       Review of Systems  Review of Systems   All other systems reviewed and are negative.       Objective:     Vitals:    19 1334   BP: 124/80       Physical Exam:   Constitutional: She is oriented to person, place, and time. She appears well-developed and well-nourished.    HENT:   Head: Normocephalic.       Pulmonary/Chest: Effort normal.          Genitourinary: Vagina normal and uterus normal. Pelvic exam was performed with patient supine. There is no rash or tenderness on the right labia. There is no rash or tenderness on the left labia. Cervix is normal. Right adnexum displays no mass and no tenderness. Left adnexum displays no mass and no tenderness.           Musculoskeletal: Normal range of motion.       Neurological: She is alert and oriented to person, place, and time.    Skin: Skin is warm and dry.    Psychiatric: She has a normal mood and affect. Her behavior is normal.     Wet prep positive for clue cells no Trich no yeast     Assessment/ Plan:      Orders Placed This Encounter    HPV High Risk Genotypes, PCR    POCT KOH    POCT Wet Prep    Liquid-based pap smear, screening    prasterone, dhea, (INTRAROSA) 6.5 mg Inst    metroNIDAZOLE (METROGEL VAGINAL) 0.75 % vaginal gel       Raiza was seen today for repeat pap.    Diagnoses and all orders for this visit:    BV (bacterial vaginosis)  -     metroNIDAZOLE (METROGEL VAGINAL) 0.75 % vaginal gel; Place 1 applicator vaginally once daily. for 5 days    Vaginal odor  -     POCT KOH  -     POCT Wet Prep  -     metroNIDAZOLE (METROGEL VAGINAL) 0.75 % vaginal gel; Place 1 applicator vaginally once daily. for 5 days    Vaginal dryness    Dyspareunia in female  -     prasterone, dhea, (INTRAROSA) 6.5 mg Inst; Place 1 tablet vaginally every evening.    Encounter for gynecological examination  -     HPV High Risk Genotypes, PCR  -     Liquid-based pap smear, screening     We discussed options and patient would like to try IntraRosa.  Pt will f/u with me and let me know how she is doing in 3 months    Follow up in about 6 months (around 1/11/2020).      Health Maintenance       Date Due Completion Date    Lipid Panel 1968 ---    TETANUS VACCINE 01/17/1986 ---    Shingles Vaccine (1 of 2) 01/17/2018 ---    Mammogram 02/03/2019 2/3/2018 (Done)    Override on 2/3/2018: Done    Override on 2/2/2017: Done    Pap Smear 06/17/2019 12/17/2018    Influenza Vaccine 08/01/2019 ---    Colonoscopy 10/08/2028 10/8/2018

## 2019-11-05 ENCOUNTER — TELEPHONE (OUTPATIENT)
Dept: GASTROENTEROLOGY | Facility: CLINIC | Age: 49
End: 2019-11-05

## 2019-11-05 DIAGNOSIS — K51.919: ICD-10-CM

## 2019-11-05 DIAGNOSIS — R19.7 DIARRHEA, UNSPECIFIED TYPE: ICD-10-CM

## 2019-11-05 DIAGNOSIS — K62.5 RECTAL BLEEDING: ICD-10-CM

## 2019-11-05 RX ORDER — MESALAMINE 1000 MG/1
1000 SUPPOSITORY RECTAL 2 TIMES DAILY
Qty: 60 SUPPOSITORY | Refills: 0 | Status: SHIPPED | OUTPATIENT
Start: 2019-11-05 | End: 2019-12-31

## 2019-11-05 RX ORDER — MESALAMINE 4 G/60ML
4 SUSPENSION RECTAL NIGHTLY
Qty: 1800 ML | Refills: 0 | Status: SHIPPED | OUTPATIENT
Start: 2019-11-05 | End: 2019-12-31

## 2019-11-05 NOTE — TELEPHONE ENCOUNTER
Patient contacted clinic requesting refill of Canasa and Rowasa.  Last seen in June.  She did not submit the stool tests that I had ordered.  All of her blood tests were unremarkable.  She also has not followed up since then.  Unclear how her symptoms are doing.    Prescriptions given for 30 days.  My staff will contact the patient regarding current symptoms.  Will help to arrange follow-up in the clinic.  She still needs to submit the stool tests.  I will not be able to provide any further refills of these medications until she has follow-up.

## 2019-12-21 DIAGNOSIS — R19.7 DIARRHEA, UNSPECIFIED TYPE: ICD-10-CM

## 2019-12-21 DIAGNOSIS — K51.919: ICD-10-CM

## 2019-12-21 DIAGNOSIS — K62.5 RECTAL BLEEDING: ICD-10-CM

## 2019-12-31 RX ORDER — MESALAMINE 4 G/60ML
4 SUSPENSION RECTAL NIGHTLY
Qty: 1800 ML | Refills: 0 | Status: SHIPPED | OUTPATIENT
Start: 2019-12-31 | End: 2020-01-30

## 2019-12-31 RX ORDER — MESALAMINE 1000 MG/1
1000 SUPPOSITORY RECTAL 2 TIMES DAILY
Qty: 60 SUPPOSITORY | Refills: 0 | Status: SHIPPED | OUTPATIENT
Start: 2019-12-31 | End: 2020-07-17 | Stop reason: SDUPTHER

## 2020-01-30 ENCOUNTER — TELEPHONE (OUTPATIENT)
Dept: OBSTETRICS AND GYNECOLOGY | Facility: CLINIC | Age: 50
End: 2020-01-30

## 2020-01-30 RX ORDER — METRONIDAZOLE 7.5 MG/G
1 GEL VAGINAL NIGHTLY
Qty: 70 G | Refills: 0 | Status: SHIPPED | OUTPATIENT
Start: 2020-01-30 | End: 2020-02-04

## 2020-01-30 NOTE — TELEPHONE ENCOUNTER
Pt c/o vaginal odor and discharge.  Declined appt.  Requesting Metrogel.  Recommended an appt if no improvement after taking medication.     Metrogel pended

## 2020-03-06 ENCOUNTER — LAB VISIT (OUTPATIENT)
Dept: LAB | Facility: HOSPITAL | Age: 50
End: 2020-03-06
Attending: OBSTETRICS & GYNECOLOGY
Payer: COMMERCIAL

## 2020-03-06 DIAGNOSIS — N95.1 MENOPAUSAL HOT FLUSHES: ICD-10-CM

## 2020-03-06 PROCEDURE — 84144 ASSAY OF PROGESTERONE: CPT

## 2020-03-06 PROCEDURE — 84443 ASSAY THYROID STIM HORMONE: CPT

## 2020-03-06 PROCEDURE — 82670 ASSAY OF TOTAL ESTRADIOL: CPT

## 2020-03-06 PROCEDURE — 83001 ASSAY OF GONADOTROPIN (FSH): CPT

## 2020-03-07 LAB
ESTRADIOL SERPL-MCNC: <10 PG/ML
FSH SERPL-ACNC: 98.6 MIU/ML
PROGEST SERPL-MCNC: 0.1 NG/ML
TSH SERPL DL<=0.005 MIU/L-ACNC: 2.51 UIU/ML (ref 0.4–4)

## 2020-03-09 ENCOUNTER — PATIENT MESSAGE (OUTPATIENT)
Dept: OBSTETRICS AND GYNECOLOGY | Facility: CLINIC | Age: 50
End: 2020-03-09

## 2020-03-09 NOTE — TELEPHONE ENCOUNTER
Called cell and no answer  and left a detailed message on her cell   Labs are c/w menopause as her slight periods and hot flashes and night sweats  I sent in rx for progesterone to take night   If sx not improved in 3 weeks - pt can call back and we can add E2  Has an appt in April and we can also address further thenDiane,   I tried to call cell and I left a message

## 2020-03-09 NOTE — PROGRESS NOTES
Called cell and no answer  and left a detailed message on her cell   Labs are c/w menopause as her slight periods and hot flashes and night sweats  I sent in rx for progesterone to take night   If sx not improved in 3 weeks - pt can call back and we can add E2  Has an appt in April and we can also address further thenDiane,   I tried to call cell and I left a message.

## 2020-03-24 RX ORDER — PROGESTERONE 100 MG/1
100 CAPSULE ORAL NIGHTLY
Qty: 30 CAPSULE | Refills: 4 | Status: SHIPPED | OUTPATIENT
Start: 2020-03-24 | End: 2020-04-27

## 2020-03-24 NOTE — TELEPHONE ENCOUNTER
Dr. Robles pt called saying that she never recieved her Rx for Progesterone. Please advise. Thank you.     Incoming call

## 2020-04-17 ENCOUNTER — TELEPHONE (OUTPATIENT)
Dept: OBSTETRICS AND GYNECOLOGY | Facility: CLINIC | Age: 50
End: 2020-04-17

## 2020-04-17 NOTE — TELEPHONE ENCOUNTER
Pt states she didn't like progesterone so she stopped taking it.      Rescheduled annual but added virtual visit to discuss HRT.

## 2020-04-23 ENCOUNTER — PATIENT MESSAGE (OUTPATIENT)
Dept: OBSTETRICS AND GYNECOLOGY | Facility: CLINIC | Age: 50
End: 2020-04-23

## 2020-04-27 ENCOUNTER — OFFICE VISIT (OUTPATIENT)
Dept: OBSTETRICS AND GYNECOLOGY | Facility: CLINIC | Age: 50
End: 2020-04-27
Payer: COMMERCIAL

## 2020-04-27 DIAGNOSIS — N95.1 MENOPAUSAL HOT FLUSHES: Primary | ICD-10-CM

## 2020-04-27 PROCEDURE — 99213 OFFICE O/P EST LOW 20 MIN: CPT | Mod: 95,,, | Performed by: OBSTETRICS & GYNECOLOGY

## 2020-04-27 PROCEDURE — 99213 PR OFFICE/OUTPT VISIT, EST, LEVL III, 20-29 MIN: ICD-10-PCS | Mod: 95,,, | Performed by: OBSTETRICS & GYNECOLOGY

## 2020-04-27 NOTE — PROGRESS NOTES
The patient location is: [facility]  The chief complaint leading to consultation is: hot flashes night sweats insomnia  Visit type: audiovisual  Total time spent with patient: 15 min    Each patient to whom he or she provides medical services by telemedicine is:  (1) informed of the relationship between the physician and patient and the respective role of any other health care provider with respect to management of the patient; and (2) notified that he or she may decline to receive medical services by telemedicine and may withdraw from such care at any time.    Notes:     Chief Complaint: hot flushes     HPI:      Raiza Cherry is a 50 y.o.  who presents complaining of hot hlshes night sweats and insomnia worse over the past 4 months  Labs c/w menopause  FHS 98 and e2 <10  Tried prometrium and did not like how she felt   Wants to try hormones but worried about weight gain  TSH normal and discussed concerns      Patient does not have regular monthly menses. No LMP recorded. Patient is perimenopausal.      ROS:     GENERAL: Denies fevers or chills. Feeling well overall.   ABDOMEN: Denies abdominal pain, constipation, diarrhea, nausea, vomiting, change in appetite.   URINARY: Denies frequency, dysuria, hematuria.  GYNECOLOGIC: See HPI.  NEUROLOGIC: Denies syncope or weakness.     Physical Exam:      PHYSICAL EXAM:  There were no vitals taken for this visit.  There is no height or weight on file to calculate BMI.     APPEARANCE: Well nourished, well developed, in no acute distress.  Exam deferred due to televisit    Results:          Assessment/Plan:     Menopausal hot flushes  -     estradiol-norethindrone (COMBIPATCH) 0.05-0.25 mg/24 hr; Place 1 patch onto the skin twice a week.  Dispense: 8 patch; Refill: 6    Will try Combipatch for the next 3 months  Annual sched in July and will f/u then      Counseling:       Use of the [facility] Patient Portal discussed and encouraged during today's visit.

## 2020-07-06 ENCOUNTER — TELEPHONE (OUTPATIENT)
Dept: OBSTETRICS AND GYNECOLOGY | Facility: CLINIC | Age: 50
End: 2020-07-06

## 2020-07-06 DIAGNOSIS — Z12.31 VISIT FOR SCREENING MAMMOGRAM: Primary | ICD-10-CM

## 2020-07-06 RX ORDER — HYDROCORTISONE ACETATE PRAMOXINE HCL 2.5; 1 G/100G; G/100G
CREAM TOPICAL 3 TIMES DAILY
Qty: 30 G | Refills: 3 | Status: SHIPPED | OUTPATIENT
Start: 2020-07-06 | End: 2021-07-29

## 2020-07-06 NOTE — TELEPHONE ENCOUNTER
Pt has been suffering with hemorrhoids since last week.  She has tried several different OTC products without relief.  Requesting a rx.      Analpram pended     mammo scheduled.

## 2020-07-14 ENCOUNTER — TELEPHONE (OUTPATIENT)
Dept: GASTROENTEROLOGY | Facility: CLINIC | Age: 50
End: 2020-07-14

## 2020-07-14 ENCOUNTER — PATIENT MESSAGE (OUTPATIENT)
Dept: GASTROENTEROLOGY | Facility: CLINIC | Age: 50
End: 2020-07-14

## 2020-07-14 DIAGNOSIS — K51.311 ULCERATIVE RECTOSIGMOIDITIS WITH RECTAL BLEEDING: Primary | ICD-10-CM

## 2020-07-14 NOTE — TELEPHONE ENCOUNTER
Spoke with patient. She was offered an appointment on 7/28 at 1:00. She would like to do a virtual visit.

## 2020-07-17 NOTE — TELEPHONE ENCOUNTER
MA spoke with patient. She will have her labs done tomorrow at our [place] location and will  a stool kit as well.

## 2020-07-17 NOTE — TELEPHONE ENCOUNTER
Patient with UC symptoms.  She has not seen us in awhile.  Blood and stool tests ordered.  Canasa refilled.  Keep scheduled appointment.

## 2020-07-18 ENCOUNTER — LAB VISIT (OUTPATIENT)
Dept: LAB | Facility: HOSPITAL | Age: 50
End: 2020-07-18
Attending: INTERNAL MEDICINE
Payer: COMMERCIAL

## 2020-07-18 DIAGNOSIS — K51.311 ULCERATIVE RECTOSIGMOIDITIS WITH RECTAL BLEEDING: ICD-10-CM

## 2020-07-18 LAB
ALBUMIN SERPL BCP-MCNC: 4.1 G/DL (ref 3.5–5.2)
ALP SERPL-CCNC: 93 U/L (ref 55–135)
ALT SERPL W/O P-5'-P-CCNC: 19 U/L (ref 10–44)
ANION GAP SERPL CALC-SCNC: 9 MMOL/L (ref 8–16)
AST SERPL-CCNC: 24 U/L (ref 10–40)
BASOPHILS # BLD AUTO: 0.08 K/UL (ref 0–0.2)
BASOPHILS NFR BLD: 1.2 % (ref 0–1.9)
BILIRUB SERPL-MCNC: 0.5 MG/DL (ref 0.1–1)
BUN SERPL-MCNC: 13 MG/DL (ref 6–20)
CALCIUM SERPL-MCNC: 9.7 MG/DL (ref 8.7–10.5)
CHLORIDE SERPL-SCNC: 104 MMOL/L (ref 95–110)
CO2 SERPL-SCNC: 26 MMOL/L (ref 23–29)
CREAT SERPL-MCNC: 0.9 MG/DL (ref 0.5–1.4)
CRP SERPL-MCNC: 0.5 MG/L (ref 0–8.2)
DIFFERENTIAL METHOD: ABNORMAL
EOSINOPHIL # BLD AUTO: 0.2 K/UL (ref 0–0.5)
EOSINOPHIL NFR BLD: 3.6 % (ref 0–8)
ERYTHROCYTE [DISTWIDTH] IN BLOOD BY AUTOMATED COUNT: 12.7 % (ref 11.5–14.5)
EST. GFR  (AFRICAN AMERICAN): >60 ML/MIN/1.73 M^2
EST. GFR  (NON AFRICAN AMERICAN): >60 ML/MIN/1.73 M^2
GLUCOSE SERPL-MCNC: 68 MG/DL (ref 70–110)
HCT VFR BLD AUTO: 43.8 % (ref 37–48.5)
HGB BLD-MCNC: 13.2 G/DL (ref 12–16)
IMM GRANULOCYTES # BLD AUTO: 0.01 K/UL (ref 0–0.04)
IMM GRANULOCYTES NFR BLD AUTO: 0.2 % (ref 0–0.5)
LYMPHOCYTES # BLD AUTO: 2.5 K/UL (ref 1–4.8)
LYMPHOCYTES NFR BLD: 39.1 % (ref 18–48)
MCH RBC QN AUTO: 31 PG (ref 27–31)
MCHC RBC AUTO-ENTMCNC: 30.1 G/DL (ref 32–36)
MCV RBC AUTO: 103 FL (ref 82–98)
MONOCYTES # BLD AUTO: 0.6 K/UL (ref 0.3–1)
MONOCYTES NFR BLD: 9 % (ref 4–15)
NEUTROPHILS # BLD AUTO: 3 K/UL (ref 1.8–7.7)
NEUTROPHILS NFR BLD: 46.9 % (ref 38–73)
NRBC BLD-RTO: 0 /100 WBC
PLATELET # BLD AUTO: 392 K/UL (ref 150–350)
PMV BLD AUTO: 10.1 FL (ref 9.2–12.9)
POTASSIUM SERPL-SCNC: 4.3 MMOL/L (ref 3.5–5.1)
PROT SERPL-MCNC: 7.6 G/DL (ref 6–8.4)
RBC # BLD AUTO: 4.26 M/UL (ref 4–5.4)
SODIUM SERPL-SCNC: 139 MMOL/L (ref 136–145)
WBC # BLD AUTO: 6.42 K/UL (ref 3.9–12.7)

## 2020-07-18 PROCEDURE — 85025 COMPLETE CBC W/AUTO DIFF WBC: CPT

## 2020-07-18 PROCEDURE — 86140 C-REACTIVE PROTEIN: CPT

## 2020-07-18 PROCEDURE — 80053 COMPREHEN METABOLIC PANEL: CPT

## 2020-07-18 PROCEDURE — 36415 COLL VENOUS BLD VENIPUNCTURE: CPT | Mod: PO

## 2020-07-20 ENCOUNTER — TELEPHONE (OUTPATIENT)
Dept: GASTROENTEROLOGY | Facility: CLINIC | Age: 50
End: 2020-07-20

## 2020-07-20 NOTE — TELEPHONE ENCOUNTER
----- Message from Bell Angulo sent at 7/20/2020 10:14 AM CDT -----  Regarding: PT  Contact: PT  PT called about the stool kit. Wanted to know how to submit the kit. Please call back     Callback: [phone]

## 2020-07-21 ENCOUNTER — LAB VISIT (OUTPATIENT)
Dept: LAB | Facility: HOSPITAL | Age: 50
End: 2020-07-21
Attending: INTERNAL MEDICINE
Payer: COMMERCIAL

## 2020-07-21 DIAGNOSIS — K51.311 ULCERATIVE RECTOSIGMOIDITIS WITH RECTAL BLEEDING: ICD-10-CM

## 2020-07-21 PROCEDURE — 87329 GIARDIA AG IA: CPT

## 2020-07-21 PROCEDURE — 83993 ASSAY FOR CALPROTECTIN FECAL: CPT

## 2020-07-21 PROCEDURE — 87324 CLOSTRIDIUM AG IA: CPT

## 2020-07-21 PROCEDURE — 87209 SMEAR COMPLEX STAIN: CPT

## 2020-07-21 PROCEDURE — 87449 NOS EACH ORGANISM AG IA: CPT

## 2020-07-22 LAB
C DIFF GDH STL QL: NEGATIVE
C DIFF TOX A+B STL QL IA: NEGATIVE
CRYPTOSP AG STL QL IA: NEGATIVE
G LAMBLIA AG STL QL IA: NEGATIVE

## 2020-07-23 LAB — O+P STL MICRO: NORMAL

## 2020-07-27 ENCOUNTER — OFFICE VISIT (OUTPATIENT)
Dept: OBSTETRICS AND GYNECOLOGY | Facility: CLINIC | Age: 50
End: 2020-07-27
Payer: COMMERCIAL

## 2020-07-27 ENCOUNTER — APPOINTMENT (OUTPATIENT)
Dept: RADIOLOGY | Facility: OTHER | Age: 50
End: 2020-07-27
Attending: OBSTETRICS & GYNECOLOGY
Payer: COMMERCIAL

## 2020-07-27 VITALS
WEIGHT: 112.44 LBS | TEMPERATURE: 98 F | BODY MASS INDEX: 19.47 KG/M2 | HEIGHT: 65 IN | WEIGHT: 116.88 LBS | HEIGHT: 65 IN | BODY MASS INDEX: 18.73 KG/M2

## 2020-07-27 DIAGNOSIS — Z12.31 VISIT FOR SCREENING MAMMOGRAM: ICD-10-CM

## 2020-07-27 DIAGNOSIS — Z01.419 ENCOUNTER FOR GYNECOLOGICAL EXAMINATION: Primary | ICD-10-CM

## 2020-07-27 DIAGNOSIS — Z98.890 HISTORY OF LOOP ELECTROSURGICAL EXCISION PROCEDURE (LEEP): ICD-10-CM

## 2020-07-27 DIAGNOSIS — Z12.4 ENCOUNTER FOR PAPANICOLAOU SMEAR FOR CERVICAL CANCER SCREENING: ICD-10-CM

## 2020-07-27 DIAGNOSIS — Z11.51 ENCOUNTER FOR SCREENING FOR HUMAN PAPILLOMAVIRUS (HPV): ICD-10-CM

## 2020-07-27 DIAGNOSIS — N90.89 VULVAR SKIN TAG: ICD-10-CM

## 2020-07-27 PROCEDURE — 99999 PR PBB SHADOW E&M-EST. PATIENT-LVL III: ICD-10-PCS | Mod: PBBFAC,,, | Performed by: OBSTETRICS & GYNECOLOGY

## 2020-07-27 PROCEDURE — 77067 SCR MAMMO BI INCL CAD: CPT | Mod: 26,,, | Performed by: RADIOLOGY

## 2020-07-27 PROCEDURE — 3008F PR BODY MASS INDEX (BMI) DOCUMENTED: ICD-10-PCS | Mod: CPTII,S$GLB,, | Performed by: OBSTETRICS & GYNECOLOGY

## 2020-07-27 PROCEDURE — 87624 HPV HI-RISK TYP POOLED RSLT: CPT

## 2020-07-27 PROCEDURE — 99396 PREV VISIT EST AGE 40-64: CPT | Mod: S$GLB,,, | Performed by: OBSTETRICS & GYNECOLOGY

## 2020-07-27 PROCEDURE — 77067 SCR MAMMO BI INCL CAD: CPT | Mod: TC,PN

## 2020-07-27 PROCEDURE — 77063 BREAST TOMOSYNTHESIS BI: CPT | Mod: 26,,, | Performed by: RADIOLOGY

## 2020-07-27 PROCEDURE — 99396 PR PREVENTIVE VISIT,EST,40-64: ICD-10-PCS | Mod: S$GLB,,, | Performed by: OBSTETRICS & GYNECOLOGY

## 2020-07-27 PROCEDURE — 3008F BODY MASS INDEX DOCD: CPT | Mod: CPTII,S$GLB,, | Performed by: OBSTETRICS & GYNECOLOGY

## 2020-07-27 PROCEDURE — 88175 CYTOPATH C/V AUTO FLUID REDO: CPT

## 2020-07-27 PROCEDURE — 77063 MAMMO DIGITAL SCREENING BILAT WITH TOMOSYNTHESIS_CAD: ICD-10-PCS | Mod: 26,,, | Performed by: RADIOLOGY

## 2020-07-27 PROCEDURE — 77067 MAMMO DIGITAL SCREENING BILAT WITH TOMOSYNTHESIS_CAD: ICD-10-PCS | Mod: 26,,, | Performed by: RADIOLOGY

## 2020-07-27 PROCEDURE — 99999 PR PBB SHADOW E&M-EST. PATIENT-LVL III: CPT | Mod: PBBFAC,,, | Performed by: OBSTETRICS & GYNECOLOGY

## 2020-07-27 RX ORDER — FLUOCINONIDE TOPICAL SOLUTION USP, 0.05% 0.5 MG/ML
1 SOLUTION TOPICAL DAILY
COMMUNITY
Start: 2020-05-22 | End: 2022-02-02

## 2020-07-27 RX ORDER — ALUMINUM CHLORIDE 20 %
SOLUTION, NON-ORAL TOPICAL
COMMUNITY
Start: 2020-06-26 | End: 2021-07-29

## 2020-07-27 RX ORDER — CYANOCOBALAMIN (VITAMIN B-12) 500 MCG
TABLET ORAL
COMMUNITY
End: 2021-07-29

## 2020-07-27 NOTE — PROGRESS NOTES
LMP: No LMP recorded. Patient is perimenopausal..    Contraception: The current method of family planning is menopausal  Meds per MD: none    Last Pap: 7/20/2019 pap & hpv negative  Last MMG: today  Last Colonoscopy: 10- linda, , repeat in 5 yrs

## 2020-07-27 NOTE — PROGRESS NOTES
Just wanted to let you know that I got your mammogram results back and the radiologist reading is perfectly normal. Let me know if you have any questions or concerns  Good to see you today!  Dr Robles

## 2020-07-27 NOTE — PROGRESS NOTES
Chief Complaint: well woman exam  Well Woman (Annual Exam)    She is established    Raiza Cherry is a 50 y.o. female  presents for a well woman exam.    *C/o skin tag right vulva- used ITC skin tag treatment and wants it gone  Doing well and sleeping better with Melatonin- did not need to take Prometrium nightly  Also seeing GI tomorrow for colitis and hemorrhoids and anal fissures- may even follow up with Dr Perkins  After she sees GI     ROS:*No abdominal pain. No discharge  No breast pain or masses, No rectal bleeding     LMP: No LMP recorded. Patient is perimenopausal..    Contraception: The current method of family planning is menopausal  Meds per MD: none     Last Pap: 2019 pap & hpv negative  S/p LEEP 2016 moderate dysplasia  Last MMG: today  Last Colonoscopy: 10- normal, , repeat in 5 yrs       Past Medical History:   Diagnosis Date    Abnormal Pap smear of cervix     LEEP ,  LGSIL, colpo  mildly abnl cells,     Chronic diarrhea     Glaucoma     Insomnia     Ulcerative colitis     proctitis       Past Surgical History:   Procedure Laterality Date    AUGMENTATION OF BREAST      BREAST SURGERY  ,     enlargement and lift of both breasts    CATARACT EXTRACTION  2013    left eye    CERVICAL BIOPSY  W/ LOOP ELECTRODE EXCISION  2016    Moderate dysplasia completely excised margins clear and ECC negative       OB History    Para Term  AB Living   3 3 3     3   SAB TAB Ectopic Multiple Live Births           3      # Outcome Date GA Lbr Jordon/2nd Weight Sex Delivery Anes PTL Lv   3 Term 10/24/07 40w0d  3.204 kg (7 lb 1 oz) M Vag-Spont EPI  KM   2 Term 96 40w0d  3.43 kg (7 lb 9 oz) F Vag-Spont EPI  KM   1 Term 04/15/93 40w0d  3.09 kg (6 lb 13 oz) F Vag-Spont EPI  KM      Obstetric Comments   Menarche ~ 11       Family History   Problem Relation Age of Onset    Lung disease Father     Diabetes Mother     Heart disease  "Mother     Lupus Sister     No Known Problems Sister     No Known Problems Sister     No Known Problems Sister     Breast cancer Neg Hx     Colon cancer Neg Hx     Ovarian cancer Neg Hx     Cancer Neg Hx        Social History     Tobacco Use    Smoking status: Never Smoker    Smokeless tobacco: Never Used   Substance Use Topics    Alcohol use: Yes     Comment: Rarely    Drug use: No         ROS:  GENERAL: Denies weight gain or weight loss. Feeling well overall.   SKIN: Denies rash or lesions.   HEENT: Denies headaches, or vision changes.   CARDIOVASCULAR: Denies palpitations or left sided chest pain.   RESPIRATORY: Denies shortness of breath or dyspnea on exertion.  BREASTS: Denies pain, lumps, or nipple discharge.   ABDOMEN: Denies abdominal pain, constipation, diarrhea, nausea, vomiting, change in appetite or rectal bleeding.   URINARY: Denies frequency, dysuria, hematuria.  NEUROLOGIC: Denies syncope or weakness.   PSYCHIATRIC: Denies depression, anxiety or mood swings.    Physical Exam:  Temp 97.7 °F (36.5 °C)   Ht 5' 5" (1.651 m)   Wt 53 kg (116 lb 13.5 oz)   BMI 19.44 kg/m²     APPEARANCE: Well nourished, well developed, in no acute distress.  AFFECT: WNL, alert and oriented x 3  SKIN: No acne or hirsutism  BREASTS: Symmetrical, no skin changes.                      No nipple discharge.   No palpable masses bilaterally  NODES: No inguinal nor axillary LAD  ABDOMEN: soft Non tender Non distended No masses  PELVIC:   Normal external genitalia with a 3-4 mm elliptical skin tag on right vulva  Treated with TCA - does not appear to be a CoAc    Normal hair distribution.   Adequate perineal body, normal urethral meatus.   No signif cystocele or rectocele.  Vagina moist and well rugated without lesions or discharge.    Cervix pink, without lesions, discharge or tenderness.     PAP performed   Bimanual exam shows uterus to be normal size, regular, mobile and nontender.    Adnexa without masses or " tenderness.    EXTREMITIES: No edema.        ASSESSMENT AND PLAN    Raiza was seen today for well woman.    Diagnoses and all orders for this visit:    Encounter for gynecological examination    History of loop electrosurgical excision procedure (LEEP)  -     HPV High Risk Genotypes, PCR  -     Liquid-Based Pap Smear, Screening    Vulvar skin tag   TCA used today per pt request   Schedule skin tag removal  In 3-4 weeks if still present      Follow up in about 1 year (around 7/27/2021) for annual.    Patient was counseled today on A.C.S. Pap guidelines and recommendations for yearly pelvic exams, mammograms and monthly self breast exams; to see her PCP for other health maintenance.     Patient encouraged to register for portal and results will be sent via portal.       Health Maintenance   Topic Date Due    Hepatitis C Screening  1970    Lipid Panel  1970    TETANUS VACCINE  01/17/1986    Mammogram  02/03/2019    Pap Smear  07/11/2020

## 2020-07-28 ENCOUNTER — OFFICE VISIT (OUTPATIENT)
Dept: GASTROENTEROLOGY | Facility: CLINIC | Age: 50
End: 2020-07-28
Payer: COMMERCIAL

## 2020-07-28 DIAGNOSIS — K64.9 HEMORRHOIDS, UNSPECIFIED HEMORRHOID TYPE: ICD-10-CM

## 2020-07-28 DIAGNOSIS — K51.311 ULCERATIVE RECTOSIGMOIDITIS WITH RECTAL BLEEDING: Primary | ICD-10-CM

## 2020-07-28 LAB — CALPROTECTIN STL-MCNT: 81.3 MCG/G

## 2020-07-28 PROCEDURE — 99213 PR OFFICE/OUTPT VISIT, EST, LEVL III, 20-29 MIN: ICD-10-PCS | Mod: 95,,, | Performed by: INTERNAL MEDICINE

## 2020-07-28 PROCEDURE — 99213 OFFICE O/P EST LOW 20 MIN: CPT | Mod: 95,,, | Performed by: INTERNAL MEDICINE

## 2020-07-29 RX ORDER — MESALAMINE 1.2 G/1
4.8 TABLET, DELAYED RELEASE ORAL
Qty: 360 TABLET | Refills: 3 | Status: SHIPPED | OUTPATIENT
Start: 2020-07-29 | End: 2021-08-31

## 2020-07-29 NOTE — PROGRESS NOTES
Gastroenterology Telemedicine Virtual Visit    The patient location is:  Patient Home  The chief complaint leading to consultation is:  [place], [place]  Visit type: Virtual visit with synchronous audio and video      Narrative:  50 y.o. female here on a telemedicine visit for ulcerative colitis.  Last seen in clinic June 2019 for the same.  She was symptomatic at that time.  I increased her Canasa to twice daily and placed her on Rowasa enemas.  She appeared to improve after that point.  She has had worsening of her symptoms.  She is having 7-8 bowel movements per day, occasionally more times.  She focuses a lot her bowel movements and it requires a lot of her attention.  Bowel movements are unpredictable at times.  She has urgency and tenesmus.  She also reports occasional abdominal pain and cramps.  She has continued to take Canasa once daily.  She denies any new eye, skin, or joint complaints.  She noticed more problems over the past month with hemorrhoids as well.  She has tried several [place] is a have not helped.  She was prescribed a hydrocortisone cream that she used for 2 weeks without improvement.  She also tried tucks pads.    She submitted stool tests and blood samples in the last week.  CMP and CBC are within normal limits.  Her CRP was normal.  C diff was negative.  Ova parasite, and Giardia/Cryptosporidium were both negative.  Her fecal calprotectin is slightly elevated.          Assessment:  1. Ulcerative rectosigmoiditis with rectal bleeding    2. 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  Intermittent symptoms over the years and has been on various treatments over this time.  Did well for many years without treatment.  Symptoms have been worsened in the last 2-3 years with increasing frequency of symptoms.  I would classify her symptoms as moderate to severe at this time.  The character of the symptoms would tend to suggest active proctitis.   Her last colonoscopy did not reveal a significant amount inflammation; however, I do not have the pathology results to review.  She is not responding well to once daily Canasa.  Ongoing symptoms are consistent with active colitis.  She also has mildly elevated fecal calprotectin.  Fortunately her CRP and blood counts are normal.  No signs of C diff for parasites and stool sampling.      Recommendation:  Use Canasa twice daily.  Restart using Rowasa enema.  I will start her on Lialda 4.8 g daily.  Follow-up in 6-8 weeks.          Total time spent with patient:  20 minutes      Each patient to whom he or she provides medical services by telemedicine is:  (1) informed of the relationship between the physician and patient and the respective role of any other health care provider with respect to management of the patient; and (2) notified that he or she may decline to receive medical services by telemedicine and may withdraw from such care at any time.

## 2020-08-03 LAB
HPV HR 12 DNA SPEC QL NAA+PROBE: NEGATIVE
HPV16 AG SPEC QL: NEGATIVE
HPV18 DNA SPEC QL NAA+PROBE: NEGATIVE

## 2020-08-06 ENCOUNTER — TELEPHONE (OUTPATIENT)
Dept: GASTROENTEROLOGY | Facility: CLINIC | Age: 50
End: 2020-08-06

## 2020-08-06 NOTE — TELEPHONE ENCOUNTER
----- Message from Gonzalo Gil MD sent at 7/29/2020  3:50 PM CDT -----  Needs follow-up with me in 6-8 weeks

## 2020-08-09 LAB
FINAL PATHOLOGIC DIAGNOSIS: NORMAL
Lab: NORMAL

## 2020-09-11 ENCOUNTER — PATIENT MESSAGE (OUTPATIENT)
Dept: GASTROENTEROLOGY | Facility: CLINIC | Age: 50
End: 2020-09-11

## 2020-09-11 RX ORDER — MESALAMINE 1000 MG/1
1000 SUPPOSITORY RECTAL 2 TIMES DAILY
Qty: 60 SUPPOSITORY | Refills: 5 | Status: SHIPPED | OUTPATIENT
Start: 2020-09-11 | End: 2021-03-10

## 2020-09-11 RX ORDER — MESALAMINE 1000 MG/1
SUPPOSITORY RECTAL
COMMUNITY
Start: 2020-07-17 | End: 2020-09-11 | Stop reason: SDUPTHER

## 2020-09-15 ENCOUNTER — OFFICE VISIT (OUTPATIENT)
Dept: GASTROENTEROLOGY | Facility: CLINIC | Age: 50
End: 2020-09-15
Payer: COMMERCIAL

## 2020-09-15 DIAGNOSIS — K51.311 ULCERATIVE RECTOSIGMOIDITIS WITH RECTAL BLEEDING: Primary | ICD-10-CM

## 2020-09-15 PROCEDURE — 99213 OFFICE O/P EST LOW 20 MIN: CPT | Mod: 95,,, | Performed by: INTERNAL MEDICINE

## 2020-09-15 PROCEDURE — 99213 PR OFFICE/OUTPT VISIT, EST, LEVL III, 20-29 MIN: ICD-10-PCS | Mod: 95,,, | Performed by: INTERNAL MEDICINE

## 2020-09-15 NOTE — PROGRESS NOTES
Gastroenterology Telemedicine Virtual Visit    The patient location is:  Patient Home  The chief complaint leading to consultation is:  Follow-up UC  Visit type: Virtual visit with synchronous audio and video      Narrative:  50 y.o. female here on a telemedicine visit for follow-up of ulcerative colitis.  I last saw her 07/28/2020 for ulcerative proctosigmoiditis.  A fecal calprotectin was elevated the time.  We placed her on Rowasa and Canasa as well as oral Lialda.  She did not use the Rowasa, but did start taking Canasa twice a day and started taking the Lialda.  She has felt much better with this regimen.  She was initially using the Canasa twice a day but has decreased to once daily when symptoms improved.  She denies tenesmus, fecal urgency, or blood in the stool.  She is having complete bowel movements.  No new symptoms or complaints.        Assessment:  1. 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  Intermittent symptoms over the years and has been on various treatments over this time.  Did well for many years without treatment.  Symptoms have been worsened in the last 2-3 years with increasing frequency of symptoms.  I was having moderate to severe symptoms at the time of our last visit in July.  The character of the symptoms suggested active proctitis and calprotectin was elevated.  Her last colonoscopy did not reveal a significant amount inflammation; however, I do not have the pathology results to review.  She was not responding well to once daily Canasa.  No signs of C diff for parasites and stool sampling.  I increased her Canasa to twice daily and start her on oral Lialda 4.8 g daily.  She has marked improvement with this.  Now having more regular bowel movements without urgency or tenesmus.  No blood in stool.  She has been able to decrease the Canasa once daily while still continuing Lialda 4.8 g daily.      Recommendation:  I  recommend she continue with Lialda 4.8 g daily.  Will get labs with CBC and CMP today.  She can continue with nightly Canasa use for now, but may be able to stop using this in the future.  We discussed today transitioning her care to our IBD team to make further management decisions and she is agreeable.          Total time spent with patient:  15 minutes      Each patient to whom he or she provides medical services by telemedicine is:  (1) informed of the relationship between the physician and patient and the respective role of any other health care provider with respect to management of the patient; and (2) notified that he or she may decline to receive medical services by telemedicine and may withdraw from such care at any time.

## 2020-10-07 ENCOUNTER — OFFICE VISIT (OUTPATIENT)
Dept: GASTROENTEROLOGY | Facility: CLINIC | Age: 50
End: 2020-10-07
Attending: INTERNAL MEDICINE
Payer: COMMERCIAL

## 2020-10-07 DIAGNOSIS — K51.30 ULCERATIVE RECTOSIGMOIDITIS WITHOUT COMPLICATION: Primary | ICD-10-CM

## 2020-10-07 PROCEDURE — 99214 OFFICE O/P EST MOD 30 MIN: CPT | Mod: 95,,, | Performed by: INTERNAL MEDICINE

## 2020-10-07 PROCEDURE — 99214 PR OFFICE/OUTPT VISIT, EST, LEVL IV, 30-39 MIN: ICD-10-PCS | Mod: 95,,, | Performed by: INTERNAL MEDICINE

## 2020-10-07 NOTE — PROGRESS NOTES
[facility]          Inflammatory Bowel Disease Follow Up Consultation Note         TODAY'S VISIT DATE:  10/7/2020    Reason for Consult:    Chief Complaint   Patient presents with    Ulcerative Colitis       PCP: Primary Doctor No      Referring MD:   Dr. Gonzalo Gil    History of Present Illness:  Raiza Cherry who is a 50 y.o. female is being seen today at the [facility] on 10/07/2020 for inflammatory bowel disease- ulcerative colitis.  This is her 1st visit in the inflammatory bowel Disease Clinic.  She has seen Dr. Gil recently.  She was started in July on Lialda and Canasa because of active symptoms.  She has been doing great since starting these medications.  She reports that she has 2-3 formed bowel movements daily with no blood in the stools.  She takes the Lialda anywhere between 2 and 4 pills a day.  She has been trying to split them up because she was concerned that 4 pills at 1 time would cause an upset stomach.  Sometimes she forgets to take her evening dose.  She continues to take the Canasa almost every night.  She denies any new problems today.    IBD History:  She has a history of left-sided ulcerative colitis.  This was diagnosed around 1990.  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  She has been managed with multiple 5 ASA products (both oral and rectal) as well as prednisone (tolerated very poorly) and Entocort. Her last colonoscopy was in October 2018 at which time there was some mild proctitis and some atrophy in the left colon.  Earlier this year she saw Dr. Gil because of active symptoms.  He started her on Lialda 4.8 g daily and Canasa suppositories twice daily initially.  After she was doing better these were reduced down to once daily.    IBD Details:  Dx Date:   1990  Disease type/distribution:  Ulcerative colitis/left colon disease extending to the descending colon  Current Treatment:  Lialda/Canasa  Start Date:  July 2020  Response:  Good  Optimized:  Yes  Adverse reactions:  None  Prior surgeries:  None  CRP Elevation:  No  Disease Complications:  None  Extraintestinal manifestations:  None  Prior treatments:   Steroids:  Good response  5ASA:  Good response  IMM:  None  TNF Inh:  None   Anti-Integrin:  None   IL 12/23:  None  ELIZABETH Inh:  None    Previous Clinical Trials:  None    Last Colonoscopy:  October 2018-rectal inflammation, atrophy of the left colon, otherwise normal    Other Endoscopies:  None    Imaging:   MRE:  None   CT:  None   Other:  None    Pertinent Labs:  Lab Results   Component Value Date    SEDRATE 6 06/12/2019    CRP 0.5 07/18/2020     Lab Results   Component Value Date    TTGIGA 5 06/12/2019     Lab Results   Component Value Date    TSH 2.508 03/06/2020    FREET4 0.95 01/17/2017     No results found for: RUIICJXU71RK, KXDXJMID23  No results found for: HEPBSAG, HEPBCAB, HEPCAB  No results found for: ENT38ACWM  No results found for: NIL, TBAG, TBAGNIL, MITOGENNIL, TBGOLD, TSPOTSCREN  No results found for: TPTMINTERP, TPMTRESULT  Lab Results   Component Value Date    CDIFFICILEAN Negative 07/21/2020    CDIFFTOX Negative 07/21/2020     Lab Results   Component Value Date    CALPROTECTIN 81.3 (H) 07/21/2020       Therapeutic Drug Monitoring Labs:  No results found for: PROMETH  No results found for: ANSADAINIT, INFLIXIMAB, INFLIXINTERP    Vaccinations:  No results found for: HEPBSAB  No results found for: HEPAIGG  No results found for: VARICELLAZOS, VARICELLAINT    There is no immunization history on file for this patient.      Review of Systems  Review of Systems   Constitutional: Negative for chills, fever and weight loss.   HENT: Negative for sore throat.    Eyes: Negative for pain, discharge and redness.   Respiratory: Negative for cough, shortness of  breath and wheezing.    Cardiovascular: Negative for chest pain, orthopnea and leg swelling.   Gastrointestinal: Negative for abdominal pain, blood in stool, constipation, diarrhea, heartburn, melena, nausea and vomiting.   Genitourinary: Negative for dysuria, frequency and urgency.   Musculoskeletal: Negative for back pain, joint pain and myalgias.   Skin: Negative for itching and rash.   Neurological: Negative for focal weakness and seizures.   Endo/Heme/Allergies: Does not bruise/bleed easily.   Psychiatric/Behavioral: Negative for depression. The patient is not nervous/anxious.        Medical History:   Past Medical History:   Diagnosis Date    Abnormal Pap smear of cervix     LEEP 2016, 9-2017 LGSIL, colpo  mildly abnl cells,     Chronic diarrhea     Glaucoma     Insomnia     Ulcerative colitis     proctitis       Surgical History:  Past Surgical History:   Procedure Laterality Date    AUGMENTATION OF BREAST      BREAST SURGERY  2014, 2018    enlargement and lift of both breasts    CATARACT EXTRACTION  2013    left eye    CERVICAL BIOPSY  W/ LOOP ELECTRODE EXCISION  02/2016    Moderate dysplasia completely excised margins clear and ECC negative       Family History:   Family History   Problem Relation Age of Onset    Lung disease Father     Diabetes Mother     Heart disease Mother     Lupus Sister     No Known Problems Sister     No Known Problems Sister     No Known Problems Sister     Breast cancer Neg Hx     Colon cancer Neg Hx     Ovarian cancer Neg Hx     Cancer Neg Hx        Social History:   Social History     Tobacco Use    Smoking status: Never Smoker    Smokeless tobacco: Never Used   Substance Use Topics    Alcohol use: Yes     Comment: Rarely    Drug use: No       Allergies: Reviewed    Home Medications:   Medication List with Changes/Refills   Current Medications    ASCORBIC ACID, VITAMIN C, (VITAMIN C) 100 MG TABLET    Take 100 mg by mouth once daily.    DRYSOL  DAB-O-MATIC 20 % EXTERNAL SOLUTION    APPLY TO AXILLA TWICE A DAY AS DIRECTED    FLUOCINONIDE (LIDEX) 0.05 % EXTERNAL SOLUTION    1 application once daily. Apply to scalp   Pt uses PRN    HYDROCORTISONE-PRAMOXINE (ANALPRAM-HC) 2.5-1 % CREA    Place rectally 3 (three) times daily.    MELATONIN 1 MG TAB    Take by mouth. PRN for sleep    MESALAMINE (CANASA) 1000 MG SUPP    Place 1 suppository (1,000 mg total) rectally 2 (two) times daily.    MESALAMINE (LIALDA) 1.2 GRAM TBEC    Take 4 tablets (4.8 g total) by mouth daily with breakfast.    MESALAMINE (ROWASA) 4 GRAM/60 ML ENEM    Place 60 mLs (4 g total) rectally every evening.    MULTIVIT,CALC,MINS/IRON/FOLIC (ONE-A-DAY WOMENS FORMULA ORAL)    Take 1 tablet by mouth once daily.    SIMBRINZA 1-0.2 % DRPS    Place 1 drop into both eyes once daily. Pt does drops once in the morning    TIMOLOL MALEATE 0.5% (TIMOPTIC) 0.5 % DROP    Place 1 drop into both eyes once daily. Pt does once daily in the am    VITAMIN C-BIOTIN (HAIR-SKIN-NAILS, VIT C-BIOTIN,) 50 MG -1,250 MCG CHEW    Take 1 tablet by mouth once daily.       Physical Exam:  Vital Signs:  There were no vitals taken for this visit.  There is no height or weight on file to calculate BMI.    Physical Exam   Constitutional: She is oriented to person, place, and time. She appears well-developed and well-nourished.   Neurological: She is alert and oriented to person, place, and time.   Psychiatric: She has a normal mood and affect. Her behavior is normal. Judgment and thought content normal.   Nursing note reviewed.    Telemedicine visit. Remainder of physical unable to be completed.    Labs: reviewed and pertinent noted above    Assessment/Plan:  Raiza Cherry is a 50 y.o. female with left-sided ulcerative colitis. The following issues were addresssed:    1. Ulcerative rectosigmoiditis without complication      1.  Ulcerative colitis:  She is doing very well.  She continues to take Lialda and Canasa.  I advised  her to try taking all 4 pills of the Lialda at 1 time to ensure adequate dosing.  One she has been taking 4 pills daily consistently for about 2 weeks she can try to decrease the Canasa suppositories to every other night and if she does well for 2 weeks she can enteritis.  The suppositories.  She is due for a colonoscopy so we will schedule that later this year for disease activity assessment and for colon cancer surveillance.  If her disease seems to be under good control we will plan to arrange for follow-up in about a year and check labs at that time.      # IBD specific health maintenance:  Colon cancer surveillance:  Due for colonoscopy    Annual:  - Eye exam:  Not applicable  - Skin exam (if on IMM/TNF):  Not happen  - reminded pt to use sunblock/hats/sunprotective clothing  - PAP (if immunosuppressed):  June 2020    DEXA:  Not applicable    Vitamin D:  Check in the future    Vaccines:    Influenza:  Scheduled October 23rd   Pneumovax:     PCV13:  Ordered today    PSV23:  Ordered today   HAV:  Check serology with next labs   HBV:  Check serology with next labs   Tdap:  Needs to be updated   MMR:  Unsure   VZV:  Check serology with next labs   HZV:  Plan to vaccinate in the future   HPV:  Not applicable   Meningococcus:  Not applicable    Follow up: Follow up in about 1 year (around 10/7/2021).    Thank you again for sending Raiza Cherry to see Dr. Shakir Breaux today at the [facility]. Please don't hesitate to contact Dr. Breaux if there are any questions regarding this evaluation, or if you have any other patients with inflammatory bowel disease for whom you would like a consultation. You can reach Dr. Breaux at [phone] or by email at [email or web address]    Bran Breaux MD  Department of Gastroenterology  Inflammatory Bowel Disease    The patient location is: [place], [place]  The chief complaint leading to consultation is: UC    Visit type:  audiovisual    Face to Face time with patient: 20 minutes  35 minutes of total time spent on the encounter, which includes face to face time and non-face to face time preparing to see the patient (eg, review of tests), Obtaining and/or reviewing separately obtained history, Documenting clinical information in the electronic or other health record, Independently interpreting results (not separately reported) and communicating results to the patient/family/caregiver, or Care coordination (not separately reported).         Each patient to whom he or she provides medical services by telemedicine is:  (1) informed of the relationship between the physician and patient and the respective role of any other health care provider with respect to management of the patient; and (2) notified that he or she may decline to receive medical services by telemedicine and may withdraw from such care at any time.    Notes:

## 2020-10-07 NOTE — PATIENT INSTRUCTIONS
1. Change mesalamine to 4 pills at one time  2. Once on 4 pills a day of mesalamine go to every other night on the suppositories  3. If doing well on every other night can try to stop suppositories  4. Get colonoscopy  5. Get flu shot  6. Get pneumonia vaccine when convenient

## 2020-10-07 NOTE — LETTER
October 7, 2020      Gonzalo Gil MD  [address] [facility] [address]           [facility]  [address]  [place] [address]  Phone: [phone]  Fax: [phone]          Patient: Raiza Cherry   MR Number: [id number]   YOB: 1970   Date of Visit: 10/7/2020       Dear Dr. Gonzalo Gil:    Thank you for referring Raiza Cherry to me for evaluation. Attached you will find relevant portions of my assessment and plan of care.    If you have questions, please do not hesitate to call me. I look forward to following Raiza Cherry along with you.    Sincerely,    Bran Breaux MD    Enclosure  CC:  No Recipients    If you would like to receive this communication electronically, please contact [email or web address] or [phone] to request more information on [email or web address] Link access.    For providers and/or their staff who would like to refer a patient to [facility], please contact us through our one-stop-shop provider referral line, [facility], at [phone].    If you feel you have received this communication in error or would no longer like to receive these types of communications, please e-mail [email or web address]

## 2020-10-15 ENCOUNTER — CLINICAL SUPPORT (OUTPATIENT)
Dept: INFECTIOUS DISEASES | Facility: CLINIC | Age: 50
End: 2020-10-15
Payer: COMMERCIAL

## 2020-10-15 DIAGNOSIS — K51.30 ULCERATIVE RECTOSIGMOIDITIS WITHOUT COMPLICATION: ICD-10-CM

## 2020-10-15 PROCEDURE — 90471 IMMUNIZATION ADMIN: CPT | Mod: S$GLB,,, | Performed by: INTERNAL MEDICINE

## 2020-10-15 PROCEDURE — 99999 PR PBB SHADOW E&M-EST. PATIENT-LVL II: ICD-10-PCS | Mod: PBBFAC,,,

## 2020-10-15 PROCEDURE — 90670 PCV13 VACCINE IM: CPT | Mod: S$GLB,,, | Performed by: INTERNAL MEDICINE

## 2020-10-15 PROCEDURE — 90471 PNEUMOCOCCAL CONJUGATE VACCINE 13-VALENT LESS THAN 5YO & GREATER THAN: ICD-10-PCS | Mod: S$GLB,,, | Performed by: INTERNAL MEDICINE

## 2020-10-15 PROCEDURE — 90670 PNEUMOCOCCAL CONJUGATE VACCINE 13-VALENT LESS THAN 5YO & GREATER THAN: ICD-10-PCS | Mod: S$GLB,,, | Performed by: INTERNAL MEDICINE

## 2020-10-15 PROCEDURE — 99999 PR PBB SHADOW E&M-EST. PATIENT-LVL II: CPT | Mod: PBBFAC,,,

## 2020-10-23 ENCOUNTER — CLINICAL SUPPORT (OUTPATIENT)
Dept: OTHER | Facility: CLINIC | Age: 50
End: 2020-10-23
Payer: COMMERCIAL

## 2020-10-23 DIAGNOSIS — Z00.8 ENCOUNTER FOR OTHER GENERAL EXAMINATION: ICD-10-CM

## 2020-10-24 VITALS — BODY MASS INDEX: 19.44 KG/M2 | HEIGHT: 65 IN

## 2020-10-24 LAB
GLUCOSE SERPL-MCNC: 82 MG/DL (ref 60–140)
HDLC SERPL-MCNC: 73 MG/DL
POC CHOLESTEROL, LDL (DOCK): 138 MG/DL
POC CHOLESTEROL, TOTAL: 232 MG/DL
TRIGL SERPL-MCNC: 104 MG/DL

## 2021-02-18 ENCOUNTER — PATIENT MESSAGE (OUTPATIENT)
Dept: OBSTETRICS AND GYNECOLOGY | Facility: CLINIC | Age: 51
End: 2021-02-18

## 2021-02-18 ENCOUNTER — PATIENT MESSAGE (OUTPATIENT)
Dept: GASTROENTEROLOGY | Facility: CLINIC | Age: 51
End: 2021-02-18

## 2021-03-01 ENCOUNTER — PATIENT MESSAGE (OUTPATIENT)
Dept: OBSTETRICS AND GYNECOLOGY | Facility: CLINIC | Age: 51
End: 2021-03-01

## 2021-03-01 DIAGNOSIS — Z12.31 BREAST CANCER SCREENING BY MAMMOGRAM: Primary | ICD-10-CM

## 2021-03-04 ENCOUNTER — PATIENT MESSAGE (OUTPATIENT)
Dept: OBSTETRICS AND GYNECOLOGY | Facility: CLINIC | Age: 51
End: 2021-03-04

## 2021-03-06 ENCOUNTER — IMMUNIZATION (OUTPATIENT)
Dept: PRIMARY CARE CLINIC | Facility: CLINIC | Age: 51
End: 2021-03-06
Payer: COMMERCIAL

## 2021-03-06 DIAGNOSIS — Z23 NEED FOR VACCINATION: Primary | ICD-10-CM

## 2021-03-06 PROCEDURE — 91300 PR SARS-COV- 2 COVID-19 VACCINE, NO PRSV, 30MCG/0.3ML, IM: ICD-10-PCS | Mod: S$GLB,,, | Performed by: INTERNAL MEDICINE

## 2021-03-06 PROCEDURE — 0001A PR IMMUNIZ ADMIN, SARS-COV-2 COVID-19 VACC, 30MCG/0.3ML, 1ST DOSE: CPT | Mod: CV19,S$GLB,, | Performed by: INTERNAL MEDICINE

## 2021-03-06 PROCEDURE — 0001A PR IMMUNIZ ADMIN, SARS-COV-2 COVID-19 VACC, 30MCG/0.3ML, 1ST DOSE: ICD-10-PCS | Mod: CV19,S$GLB,, | Performed by: INTERNAL MEDICINE

## 2021-03-06 PROCEDURE — 91300 PR SARS-COV- 2 COVID-19 VACCINE, NO PRSV, 30MCG/0.3ML, IM: CPT | Mod: S$GLB,,, | Performed by: INTERNAL MEDICINE

## 2021-03-06 RX ADMIN — Medication 0.3 ML: at 08:03

## 2021-03-22 ENCOUNTER — TELEPHONE (OUTPATIENT)
Dept: ENDOSCOPY | Facility: HOSPITAL | Age: 51
End: 2021-03-22

## 2021-03-22 ENCOUNTER — PATIENT MESSAGE (OUTPATIENT)
Dept: ENDOSCOPY | Facility: HOSPITAL | Age: 51
End: 2021-03-22

## 2021-03-27 ENCOUNTER — IMMUNIZATION (OUTPATIENT)
Dept: PRIMARY CARE CLINIC | Facility: CLINIC | Age: 51
End: 2021-03-27
Payer: COMMERCIAL

## 2021-03-27 DIAGNOSIS — Z23 NEED FOR VACCINATION: Primary | ICD-10-CM

## 2021-03-27 PROCEDURE — 91300 PR SARS-COV- 2 COVID-19 VACCINE, NO PRSV, 30MCG/0.3ML, IM: CPT | Mod: S$GLB,,, | Performed by: INTERNAL MEDICINE

## 2021-03-27 PROCEDURE — 0002A PR IMMUNIZ ADMIN, SARS-COV-2 COVID-19 VACC, 30MCG/0.3ML, 2ND DOSE: ICD-10-PCS | Mod: CV19,S$GLB,, | Performed by: INTERNAL MEDICINE

## 2021-03-27 PROCEDURE — 91300 PR SARS-COV- 2 COVID-19 VACCINE, NO PRSV, 30MCG/0.3ML, IM: ICD-10-PCS | Mod: S$GLB,,, | Performed by: INTERNAL MEDICINE

## 2021-03-27 PROCEDURE — 0002A PR IMMUNIZ ADMIN, SARS-COV-2 COVID-19 VACC, 30MCG/0.3ML, 2ND DOSE: CPT | Mod: CV19,S$GLB,, | Performed by: INTERNAL MEDICINE

## 2021-03-27 RX ADMIN — Medication 0.3 ML: at 08:03

## 2021-06-08 ENCOUNTER — PATIENT MESSAGE (OUTPATIENT)
Dept: OBSTETRICS AND GYNECOLOGY | Facility: CLINIC | Age: 51
End: 2021-06-08

## 2021-06-17 ENCOUNTER — HOSPITAL ENCOUNTER (OUTPATIENT)
Dept: RADIOLOGY | Facility: HOSPITAL | Age: 51
Discharge: HOME | End: 2021-06-17
Attending: FAMILY MEDICINE
Payer: COMMERCIAL

## 2021-06-17 ENCOUNTER — OFFICE VISIT (OUTPATIENT)
Dept: SPORTS MEDICINE | Facility: CLINIC | Age: 51
End: 2021-06-17
Payer: COMMERCIAL

## 2021-06-17 VITALS — HEIGHT: 65 IN | BODY MASS INDEX: 20.83 KG/M2 | WEIGHT: 125 LBS | TEMPERATURE: 98 F

## 2021-06-17 DIAGNOSIS — G89.29 CHRONIC RIGHT HIP PAIN: ICD-10-CM

## 2021-06-17 DIAGNOSIS — M25.551 CHRONIC RIGHT HIP PAIN: Primary | ICD-10-CM

## 2021-06-17 DIAGNOSIS — M25.551 CHRONIC RIGHT HIP PAIN: ICD-10-CM

## 2021-06-17 DIAGNOSIS — R26.89 ANTALGIC GAIT: ICD-10-CM

## 2021-06-17 DIAGNOSIS — R52 MECHANICAL PAIN: ICD-10-CM

## 2021-06-17 DIAGNOSIS — G89.29 CHRONIC RIGHT HIP PAIN: Primary | ICD-10-CM

## 2021-06-17 PROCEDURE — 1125F PR PAIN SEVERITY QUANTIFIED, PAIN PRESENT: ICD-10-PCS | Mod: S$GLB,,, | Performed by: FAMILY MEDICINE

## 2021-06-17 PROCEDURE — 99204 PR OFFICE/OUTPT VISIT, NEW, LEVL IV, 45-59 MIN: ICD-10-PCS | Mod: S$GLB,,, | Performed by: FAMILY MEDICINE

## 2021-06-17 PROCEDURE — 99204 OFFICE O/P NEW MOD 45 MIN: CPT | Mod: S$GLB,,, | Performed by: FAMILY MEDICINE

## 2021-06-17 PROCEDURE — 73521 X-RAY EXAM HIPS BI 2 VIEWS: CPT | Mod: 26,,, | Performed by: RADIOLOGY

## 2021-06-17 PROCEDURE — 73521 XR HIPS BILATERAL 2 VIEW INCL AP PELVIS: ICD-10-PCS | Mod: 26,,, | Performed by: RADIOLOGY

## 2021-06-17 PROCEDURE — 3008F BODY MASS INDEX DOCD: CPT | Mod: CPTII,S$GLB,, | Performed by: FAMILY MEDICINE

## 2021-06-17 PROCEDURE — 3008F PR BODY MASS INDEX (BMI) DOCUMENTED: ICD-10-PCS | Mod: CPTII,S$GLB,, | Performed by: FAMILY MEDICINE

## 2021-06-17 PROCEDURE — 73521 X-RAY EXAM HIPS BI 2 VIEWS: CPT | Mod: TC

## 2021-06-17 PROCEDURE — 99999 PR PBB SHADOW E&M-EST. PATIENT-LVL III: ICD-10-PCS | Mod: PBBFAC,,, | Performed by: FAMILY MEDICINE

## 2021-06-17 PROCEDURE — 99999 PR PBB SHADOW E&M-EST. PATIENT-LVL III: CPT | Mod: PBBFAC,,, | Performed by: FAMILY MEDICINE

## 2021-06-17 PROCEDURE — 1125F AMNT PAIN NOTED PAIN PRSNT: CPT | Mod: S$GLB,,, | Performed by: FAMILY MEDICINE

## 2021-07-29 ENCOUNTER — OFFICE VISIT (OUTPATIENT)
Dept: OBSTETRICS AND GYNECOLOGY | Facility: CLINIC | Age: 51
End: 2021-07-29
Attending: OBSTETRICS & GYNECOLOGY
Payer: COMMERCIAL

## 2021-07-29 ENCOUNTER — APPOINTMENT (OUTPATIENT)
Dept: RADIOLOGY | Facility: OTHER | Age: 51
End: 2021-07-29
Attending: OBSTETRICS & GYNECOLOGY
Payer: COMMERCIAL

## 2021-07-29 VITALS — HEIGHT: 65 IN | WEIGHT: 116.88 LBS | BODY MASS INDEX: 19.47 KG/M2

## 2021-07-29 DIAGNOSIS — N90.89 VULVAR SKIN TAG: ICD-10-CM

## 2021-07-29 DIAGNOSIS — Z12.31 BREAST CANCER SCREENING BY MAMMOGRAM: ICD-10-CM

## 2021-07-29 DIAGNOSIS — N89.8 VAGINAL DRYNESS: ICD-10-CM

## 2021-07-29 DIAGNOSIS — N95.1 MENOPAUSAL HOT FLUSHES: ICD-10-CM

## 2021-07-29 DIAGNOSIS — Z01.419 ENCOUNTER FOR GYNECOLOGICAL EXAMINATION: Primary | ICD-10-CM

## 2021-07-29 PROCEDURE — 99999 PR PBB SHADOW E&M-EST. PATIENT-LVL III: CPT | Mod: PBBFAC,,, | Performed by: OBSTETRICS & GYNECOLOGY

## 2021-07-29 PROCEDURE — 3008F BODY MASS INDEX DOCD: CPT | Mod: CPTII,S$GLB,, | Performed by: OBSTETRICS & GYNECOLOGY

## 2021-07-29 PROCEDURE — 88305 TISSUE EXAM BY PATHOLOGIST: ICD-10-PCS | Mod: 26,,, | Performed by: PATHOLOGY

## 2021-07-29 PROCEDURE — 1159F MED LIST DOCD IN RCRD: CPT | Mod: CPTII,S$GLB,, | Performed by: OBSTETRICS & GYNECOLOGY

## 2021-07-29 PROCEDURE — 99396 PREV VISIT EST AGE 40-64: CPT | Mod: 25,S$GLB,, | Performed by: OBSTETRICS & GYNECOLOGY

## 2021-07-29 PROCEDURE — 77063 BREAST TOMOSYNTHESIS BI: CPT | Mod: 26,,, | Performed by: RADIOLOGY

## 2021-07-29 PROCEDURE — 88305 TISSUE EXAM BY PATHOLOGIST: CPT | Mod: 26,,, | Performed by: PATHOLOGY

## 2021-07-29 PROCEDURE — 99999 PR PBB SHADOW E&M-EST. PATIENT-LVL III: ICD-10-PCS | Mod: PBBFAC,,, | Performed by: OBSTETRICS & GYNECOLOGY

## 2021-07-29 PROCEDURE — 56605 BIOPSY (GYNECOLOGICAL): ICD-10-PCS | Mod: S$GLB,,, | Performed by: OBSTETRICS & GYNECOLOGY

## 2021-07-29 PROCEDURE — 1126F AMNT PAIN NOTED NONE PRSNT: CPT | Mod: CPTII,S$GLB,, | Performed by: OBSTETRICS & GYNECOLOGY

## 2021-07-29 PROCEDURE — 3008F PR BODY MASS INDEX (BMI) DOCUMENTED: ICD-10-PCS | Mod: CPTII,S$GLB,, | Performed by: OBSTETRICS & GYNECOLOGY

## 2021-07-29 PROCEDURE — 77063 MAMMO DIGITAL SCREENING BILAT WITH TOMO: ICD-10-PCS | Mod: 26,,, | Performed by: RADIOLOGY

## 2021-07-29 PROCEDURE — 1159F PR MEDICATION LIST DOCUMENTED IN MEDICAL RECORD: ICD-10-PCS | Mod: CPTII,S$GLB,, | Performed by: OBSTETRICS & GYNECOLOGY

## 2021-07-29 PROCEDURE — 88305 TISSUE EXAM BY PATHOLOGIST: CPT | Performed by: PATHOLOGY

## 2021-07-29 PROCEDURE — 1126F PR PAIN SEVERITY QUANTIFIED, NO PAIN PRESENT: ICD-10-PCS | Mod: CPTII,S$GLB,, | Performed by: OBSTETRICS & GYNECOLOGY

## 2021-07-29 PROCEDURE — 56605 BIOPSY OF VULVA/PERINEUM: CPT | Mod: S$GLB,,, | Performed by: OBSTETRICS & GYNECOLOGY

## 2021-07-29 PROCEDURE — 99396 PR PREVENTIVE VISIT,EST,40-64: ICD-10-PCS | Mod: 25,S$GLB,, | Performed by: OBSTETRICS & GYNECOLOGY

## 2021-07-29 PROCEDURE — 77067 MAMMO DIGITAL SCREENING BILAT WITH TOMO: ICD-10-PCS | Mod: 26,,, | Performed by: RADIOLOGY

## 2021-07-29 PROCEDURE — 77067 SCR MAMMO BI INCL CAD: CPT | Mod: TC,PN

## 2021-07-29 PROCEDURE — 77067 SCR MAMMO BI INCL CAD: CPT | Mod: 26,,, | Performed by: RADIOLOGY

## 2021-07-29 RX ORDER — METHAZOLAMIDE 25 MG/1
25 TABLET ORAL 2 TIMES DAILY
COMMUNITY
Start: 2021-05-04 | End: 2021-09-23

## 2021-07-29 RX ORDER — PRASTERONE 6.5 MG/1
6.5 INSERT VAGINAL
Qty: 8 EACH | Refills: 11 | Status: SHIPPED | OUTPATIENT
Start: 2021-07-29 | End: 2021-10-21

## 2021-07-29 RX ORDER — ACETAZOLAMIDE 500 MG/1
500 CAPSULE, EXTENDED RELEASE ORAL 2 TIMES DAILY
COMMUNITY
Start: 2021-04-20 | End: 2021-09-23

## 2021-08-06 LAB
FINAL PATHOLOGIC DIAGNOSIS: NORMAL
GROSS: NORMAL
Lab: NORMAL

## 2021-08-09 ENCOUNTER — PATIENT MESSAGE (OUTPATIENT)
Dept: OBSTETRICS AND GYNECOLOGY | Facility: CLINIC | Age: 51
End: 2021-08-09

## 2021-08-09 DIAGNOSIS — N95.1 MENOPAUSAL HOT FLUSHES: Primary | ICD-10-CM

## 2021-08-11 RX ORDER — ESTRADIOL 0.5 MG/1
0.5 TABLET ORAL DAILY
Qty: 90 TABLET | Refills: 3 | Status: SHIPPED | OUTPATIENT
Start: 2021-08-11 | End: 2021-09-23

## 2021-08-11 RX ORDER — PROGESTERONE 100 MG/1
100 CAPSULE ORAL NIGHTLY
Qty: 90 CAPSULE | Refills: 3 | Status: SHIPPED | OUTPATIENT
Start: 2021-08-11 | End: 2021-09-23

## 2021-08-19 ENCOUNTER — PATIENT MESSAGE (OUTPATIENT)
Dept: OBSTETRICS AND GYNECOLOGY | Facility: CLINIC | Age: 51
End: 2021-08-19

## 2021-09-07 ENCOUNTER — PATIENT MESSAGE (OUTPATIENT)
Dept: GASTROENTEROLOGY | Facility: CLINIC | Age: 51
End: 2021-09-07

## 2021-09-07 DIAGNOSIS — K51.30 ULCERATIVE RECTOSIGMOIDITIS WITHOUT COMPLICATION: Primary | ICD-10-CM

## 2021-09-07 RX ORDER — MESALAMINE 1000 MG/1
1000 SUPPOSITORY RECTAL 2 TIMES DAILY
Qty: 60 SUPPOSITORY | Refills: 1 | Status: SHIPPED | OUTPATIENT
Start: 2021-09-07 | End: 2022-01-03

## 2021-09-07 RX ORDER — MESALAMINE 1.2 G/1
4.8 TABLET, DELAYED RELEASE ORAL
Qty: 360 TABLET | Refills: 0 | Status: SHIPPED | OUTPATIENT
Start: 2021-09-07 | End: 2021-10-07

## 2021-09-23 ENCOUNTER — OFFICE VISIT (OUTPATIENT)
Dept: INTERNAL MEDICINE | Facility: CLINIC | Age: 51
End: 2021-09-23
Payer: COMMERCIAL

## 2021-09-23 VITALS
HEART RATE: 55 BPM | TEMPERATURE: 97 F | WEIGHT: 119.5 LBS | BODY MASS INDEX: 19.91 KG/M2 | OXYGEN SATURATION: 97 % | SYSTOLIC BLOOD PRESSURE: 120 MMHG | DIASTOLIC BLOOD PRESSURE: 80 MMHG | HEIGHT: 65 IN

## 2021-09-23 DIAGNOSIS — Z00.00 ANNUAL PHYSICAL EXAM: Primary | ICD-10-CM

## 2021-09-23 DIAGNOSIS — H26.9 CATARACT OF RIGHT EYE, UNSPECIFIED CATARACT TYPE: ICD-10-CM

## 2021-09-23 DIAGNOSIS — Z23 NEED FOR TDAP VACCINATION: ICD-10-CM

## 2021-09-23 DIAGNOSIS — Z01.818 PREOP EXAMINATION: ICD-10-CM

## 2021-09-23 PROCEDURE — 90715 TDAP VACCINE 7 YRS/> IM: CPT | Mod: S$GLB,,, | Performed by: FAMILY MEDICINE

## 2021-09-23 PROCEDURE — 3074F PR MOST RECENT SYSTOLIC BLOOD PRESSURE < 130 MM HG: ICD-10-PCS | Mod: CPTII,S$GLB,, | Performed by: FAMILY MEDICINE

## 2021-09-23 PROCEDURE — 90471 TDAP VACCINE GREATER THAN OR EQUAL TO 7YO IM: ICD-10-PCS | Mod: S$GLB,,, | Performed by: FAMILY MEDICINE

## 2021-09-23 PROCEDURE — 3008F PR BODY MASS INDEX (BMI) DOCUMENTED: ICD-10-PCS | Mod: CPTII,S$GLB,, | Performed by: FAMILY MEDICINE

## 2021-09-23 PROCEDURE — 3079F PR MOST RECENT DIASTOLIC BLOOD PRESSURE 80-89 MM HG: ICD-10-PCS | Mod: CPTII,S$GLB,, | Performed by: FAMILY MEDICINE

## 2021-09-23 PROCEDURE — 99386 PR PREVENTIVE VISIT,NEW,40-64: ICD-10-PCS | Mod: 25,S$GLB,, | Performed by: FAMILY MEDICINE

## 2021-09-23 PROCEDURE — 3074F SYST BP LT 130 MM HG: CPT | Mod: CPTII,S$GLB,, | Performed by: FAMILY MEDICINE

## 2021-09-23 PROCEDURE — 3008F BODY MASS INDEX DOCD: CPT | Mod: CPTII,S$GLB,, | Performed by: FAMILY MEDICINE

## 2021-09-23 PROCEDURE — 99386 PREV VISIT NEW AGE 40-64: CPT | Mod: 25,S$GLB,, | Performed by: FAMILY MEDICINE

## 2021-09-23 PROCEDURE — 3079F DIAST BP 80-89 MM HG: CPT | Mod: CPTII,S$GLB,, | Performed by: FAMILY MEDICINE

## 2021-09-23 PROCEDURE — 1160F PR REVIEW ALL MEDS BY PRESCRIBER/CLIN PHARMACIST DOCUMENTED: ICD-10-PCS | Mod: CPTII,S$GLB,, | Performed by: FAMILY MEDICINE

## 2021-09-23 PROCEDURE — 1160F RVW MEDS BY RX/DR IN RCRD: CPT | Mod: CPTII,S$GLB,, | Performed by: FAMILY MEDICINE

## 2021-09-23 PROCEDURE — 90715 TDAP VACCINE GREATER THAN OR EQUAL TO 7YO IM: ICD-10-PCS | Mod: S$GLB,,, | Performed by: FAMILY MEDICINE

## 2021-09-23 PROCEDURE — 90471 IMMUNIZATION ADMIN: CPT | Mod: S$GLB,,, | Performed by: FAMILY MEDICINE

## 2021-09-23 PROCEDURE — 1159F PR MEDICATION LIST DOCUMENTED IN MEDICAL RECORD: ICD-10-PCS | Mod: CPTII,S$GLB,, | Performed by: FAMILY MEDICINE

## 2021-09-23 PROCEDURE — 99999 PR PBB SHADOW E&M-EST. PATIENT-LVL IV: ICD-10-PCS | Mod: PBBFAC,,, | Performed by: FAMILY MEDICINE

## 2021-09-23 PROCEDURE — 1159F MED LIST DOCD IN RCRD: CPT | Mod: CPTII,S$GLB,, | Performed by: FAMILY MEDICINE

## 2021-09-23 PROCEDURE — 99999 PR PBB SHADOW E&M-EST. PATIENT-LVL IV: CPT | Mod: PBBFAC,,, | Performed by: FAMILY MEDICINE

## 2021-10-07 ENCOUNTER — TELEPHONE (OUTPATIENT)
Dept: GASTROENTEROLOGY | Facility: CLINIC | Age: 51
End: 2021-10-07

## 2021-10-07 ENCOUNTER — OFFICE VISIT (OUTPATIENT)
Dept: GASTROENTEROLOGY | Facility: CLINIC | Age: 51
End: 2021-10-07
Payer: COMMERCIAL

## 2021-10-07 DIAGNOSIS — K51.30 ULCERATIVE RECTOSIGMOIDITIS WITHOUT COMPLICATION: ICD-10-CM

## 2021-10-07 DIAGNOSIS — K51.311 ULCERATIVE RECTOSIGMOIDITIS WITH RECTAL BLEEDING: Primary | ICD-10-CM

## 2021-10-07 PROCEDURE — 1159F PR MEDICATION LIST DOCUMENTED IN MEDICAL RECORD: ICD-10-PCS | Mod: CPTII,95,, | Performed by: INTERNAL MEDICINE

## 2021-10-07 PROCEDURE — 1159F MED LIST DOCD IN RCRD: CPT | Mod: CPTII,95,, | Performed by: INTERNAL MEDICINE

## 2021-10-07 PROCEDURE — 99214 OFFICE O/P EST MOD 30 MIN: CPT | Mod: 95,,, | Performed by: INTERNAL MEDICINE

## 2021-10-07 PROCEDURE — 1160F PR REVIEW ALL MEDS BY PRESCRIBER/CLIN PHARMACIST DOCUMENTED: ICD-10-PCS | Mod: CPTII,95,, | Performed by: INTERNAL MEDICINE

## 2021-10-07 PROCEDURE — 1160F RVW MEDS BY RX/DR IN RCRD: CPT | Mod: CPTII,95,, | Performed by: INTERNAL MEDICINE

## 2021-10-07 PROCEDURE — 99214 PR OFFICE/OUTPT VISIT, EST, LEVL IV, 30-39 MIN: ICD-10-PCS | Mod: 95,,, | Performed by: INTERNAL MEDICINE

## 2021-10-07 RX ORDER — MESALAMINE 1.2 G/1
4.8 TABLET, DELAYED RELEASE ORAL
Qty: 360 TABLET | Refills: 3 | Status: SHIPPED | OUTPATIENT
Start: 2021-10-07 | End: 2022-10-17

## 2021-10-08 ENCOUNTER — PATIENT MESSAGE (OUTPATIENT)
Dept: ENDOSCOPY | Facility: HOSPITAL | Age: 51
End: 2021-10-08

## 2021-10-08 DIAGNOSIS — Z12.11 SCREENING FOR COLON CANCER: Primary | ICD-10-CM

## 2021-10-08 DIAGNOSIS — Z01.818 PRE-OP TESTING: Primary | ICD-10-CM

## 2021-10-08 RX ORDER — SODIUM, POTASSIUM,MAG SULFATES 17.5-3.13G
1 SOLUTION, RECONSTITUTED, ORAL ORAL DAILY
Qty: 1 KIT | Refills: 0 | Status: SHIPPED | OUTPATIENT
Start: 2021-10-08 | End: 2021-10-10

## 2021-10-13 ENCOUNTER — LAB VISIT (OUTPATIENT)
Dept: LAB | Facility: HOSPITAL | Age: 51
End: 2021-10-13
Attending: INTERNAL MEDICINE
Payer: COMMERCIAL

## 2021-10-13 DIAGNOSIS — K51.311 ULCERATIVE RECTOSIGMOIDITIS WITH RECTAL BLEEDING: ICD-10-CM

## 2021-10-13 LAB
25(OH)D3+25(OH)D2 SERPL-MCNC: 59 NG/ML (ref 30–96)
ALBUMIN SERPL BCP-MCNC: 4.7 G/DL (ref 3.5–5.2)
ALP SERPL-CCNC: 119 U/L (ref 55–135)
ALT SERPL W/O P-5'-P-CCNC: 22 U/L (ref 10–44)
ANION GAP SERPL CALC-SCNC: 13 MMOL/L (ref 8–16)
AST SERPL-CCNC: 19 U/L (ref 10–40)
BASOPHILS # BLD AUTO: 0.08 K/UL (ref 0–0.2)
BASOPHILS NFR BLD: 1.1 % (ref 0–1.9)
BILIRUB SERPL-MCNC: 0.4 MG/DL (ref 0.1–1)
BUN SERPL-MCNC: 9 MG/DL (ref 6–20)
CALCIUM SERPL-MCNC: 10.2 MG/DL (ref 8.7–10.5)
CHLORIDE SERPL-SCNC: 104 MMOL/L (ref 95–110)
CO2 SERPL-SCNC: 23 MMOL/L (ref 23–29)
CREAT SERPL-MCNC: 0.7 MG/DL (ref 0.5–1.4)
CRP SERPL-MCNC: 0.6 MG/L (ref 0–8.2)
DIFFERENTIAL METHOD: ABNORMAL
EOSINOPHIL # BLD AUTO: 0.1 K/UL (ref 0–0.5)
EOSINOPHIL NFR BLD: 1.8 % (ref 0–8)
ERYTHROCYTE [DISTWIDTH] IN BLOOD BY AUTOMATED COUNT: 12.5 % (ref 11.5–14.5)
EST. GFR  (AFRICAN AMERICAN): >60 ML/MIN/1.73 M^2
EST. GFR  (NON AFRICAN AMERICAN): >60 ML/MIN/1.73 M^2
GLUCOSE SERPL-MCNC: 86 MG/DL (ref 70–110)
HCT VFR BLD AUTO: 42.6 % (ref 37–48.5)
HGB BLD-MCNC: 14 G/DL (ref 12–16)
IGA SERPL-MCNC: 285 MG/DL (ref 40–350)
IMM GRANULOCYTES # BLD AUTO: 0.01 K/UL (ref 0–0.04)
IMM GRANULOCYTES NFR BLD AUTO: 0.1 % (ref 0–0.5)
LYMPHOCYTES # BLD AUTO: 2.6 K/UL (ref 1–4.8)
LYMPHOCYTES NFR BLD: 36.4 % (ref 18–48)
MCH RBC QN AUTO: 32 PG (ref 27–31)
MCHC RBC AUTO-ENTMCNC: 32.9 G/DL (ref 32–36)
MCV RBC AUTO: 98 FL (ref 82–98)
MONOCYTES # BLD AUTO: 0.4 K/UL (ref 0.3–1)
MONOCYTES NFR BLD: 5.8 % (ref 4–15)
NEUTROPHILS # BLD AUTO: 3.9 K/UL (ref 1.8–7.7)
NEUTROPHILS NFR BLD: 54.8 % (ref 38–73)
NRBC BLD-RTO: 0 /100 WBC
PLATELET # BLD AUTO: 368 K/UL (ref 150–450)
PMV BLD AUTO: 9.4 FL (ref 9.2–12.9)
POTASSIUM SERPL-SCNC: 4 MMOL/L (ref 3.5–5.1)
PROT SERPL-MCNC: 8.6 G/DL (ref 6–8.4)
RBC # BLD AUTO: 4.37 M/UL (ref 4–5.4)
SODIUM SERPL-SCNC: 140 MMOL/L (ref 136–145)
T4 FREE SERPL-MCNC: 0.84 NG/DL (ref 0.71–1.51)
TSH SERPL DL<=0.005 MIU/L-ACNC: 3.02 UIU/ML (ref 0.4–4)
VIT B12 SERPL-MCNC: >2000 PG/ML (ref 210–950)
WBC # BLD AUTO: 7.2 K/UL (ref 3.9–12.7)

## 2021-10-13 PROCEDURE — 87340 HEPATITIS B SURFACE AG IA: CPT | Performed by: INTERNAL MEDICINE

## 2021-10-13 PROCEDURE — 82657 ENZYME CELL ACTIVITY: CPT | Performed by: INTERNAL MEDICINE

## 2021-10-13 PROCEDURE — 86762 RUBELLA ANTIBODY: CPT | Performed by: INTERNAL MEDICINE

## 2021-10-13 PROCEDURE — 86787 VARICELLA-ZOSTER ANTIBODY: CPT | Performed by: INTERNAL MEDICINE

## 2021-10-13 PROCEDURE — 86765 RUBEOLA ANTIBODY: CPT | Performed by: INTERNAL MEDICINE

## 2021-10-13 PROCEDURE — 86735 MUMPS ANTIBODY: CPT | Performed by: INTERNAL MEDICINE

## 2021-10-13 PROCEDURE — 82784 ASSAY IGA/IGD/IGG/IGM EACH: CPT | Performed by: INTERNAL MEDICINE

## 2021-10-13 PROCEDURE — 84439 ASSAY OF FREE THYROXINE: CPT | Performed by: INTERNAL MEDICINE

## 2021-10-13 PROCEDURE — 82607 VITAMIN B-12: CPT | Performed by: INTERNAL MEDICINE

## 2021-10-13 PROCEDURE — 82306 VITAMIN D 25 HYDROXY: CPT | Performed by: INTERNAL MEDICINE

## 2021-10-13 PROCEDURE — 80053 COMPREHEN METABOLIC PANEL: CPT | Performed by: INTERNAL MEDICINE

## 2021-10-13 PROCEDURE — 86704 HEP B CORE ANTIBODY TOTAL: CPT | Performed by: INTERNAL MEDICINE

## 2021-10-13 PROCEDURE — 84443 ASSAY THYROID STIM HORMONE: CPT | Performed by: INTERNAL MEDICINE

## 2021-10-13 PROCEDURE — 85025 COMPLETE CBC W/AUTO DIFF WBC: CPT | Performed by: INTERNAL MEDICINE

## 2021-10-13 PROCEDURE — 86706 HEP B SURFACE ANTIBODY: CPT | Performed by: INTERNAL MEDICINE

## 2021-10-13 PROCEDURE — 86803 HEPATITIS C AB TEST: CPT | Performed by: INTERNAL MEDICINE

## 2021-10-13 PROCEDURE — 83516 IMMUNOASSAY NONANTIBODY: CPT | Performed by: INTERNAL MEDICINE

## 2021-10-13 PROCEDURE — 86790 VIRUS ANTIBODY NOS: CPT | Performed by: INTERNAL MEDICINE

## 2021-10-13 PROCEDURE — 87389 HIV-1 AG W/HIV-1&-2 AB AG IA: CPT | Performed by: INTERNAL MEDICINE

## 2021-10-13 PROCEDURE — 86140 C-REACTIVE PROTEIN: CPT | Performed by: INTERNAL MEDICINE

## 2021-10-14 ENCOUNTER — TELEPHONE (OUTPATIENT)
Dept: GASTROENTEROLOGY | Facility: CLINIC | Age: 51
End: 2021-10-14

## 2021-10-14 LAB
HBV CORE AB SERPL QL IA: NEGATIVE
HBV SURFACE AB SER-ACNC: NEGATIVE M[IU]/ML
HBV SURFACE AG SERPL QL IA: NEGATIVE
HCV AB SERPL QL IA: NEGATIVE
HEPATITIS A ANTIBODY, IGG: NEGATIVE
HIV 1+2 AB+HIV1 P24 AG SERPL QL IA: NEGATIVE
MUMPS IGG INTERPRETATION: POSITIVE
MUMPS IGG SCREEN: 2.14 ISR (ref 0–0.9)
RUBEOLA IGG ANTIBODY: 2.25 ISR (ref 0–0.9)
RUBEOLA INTERPRETATION: POSITIVE
RUBV IGG SER-ACNC: >400 IU/ML
RUBV IGG SER-IMP: REACTIVE
VARICELLA INTERPRETATION: POSITIVE
VARICELLA ZOSTER IGG: 4.03 ISR (ref 0–0.9)

## 2021-10-18 ENCOUNTER — TELEPHONE (OUTPATIENT)
Dept: GASTROENTEROLOGY | Facility: CLINIC | Age: 51
End: 2021-10-18

## 2021-10-18 LAB
6-METHYLMERCAPTOPURINE RIBOSIDE: 4.92 NMOL/ML/H (ref 5.04–9.57)
6-METHYLMERCAPTOPURINE: 2.16 NMOL/ML/H (ref 3–6.66)
6-METHYLTHIOGUANINE RIBOSIDE: 4.23 NMOL/ML/H (ref 2.7–5.84)
TPMT INTERPRETATION: ABNORMAL
TPMT REVIEWED BY: ABNORMAL

## 2021-10-19 ENCOUNTER — TELEPHONE (OUTPATIENT)
Dept: GASTROENTEROLOGY | Facility: CLINIC | Age: 51
End: 2021-10-19

## 2021-10-19 LAB — TTG IGA SER-ACNC: 7 UNITS

## 2021-10-21 ENCOUNTER — OFFICE VISIT (OUTPATIENT)
Dept: OPHTHALMOLOGY | Facility: CLINIC | Age: 51
End: 2021-10-21
Payer: COMMERCIAL

## 2021-10-21 DIAGNOSIS — H40.1121 PRIMARY OPEN-ANGLE GLAUCOMA, LEFT EYE, MILD STAGE: ICD-10-CM

## 2021-10-21 DIAGNOSIS — H40.041 STEROID RESPONDER, RIGHT EYE: ICD-10-CM

## 2021-10-21 DIAGNOSIS — Z83.511 FAMILY HISTORY OF GLAUCOMA: ICD-10-CM

## 2021-10-21 DIAGNOSIS — H40.1112 PRIMARY OPEN ANGLE GLAUCOMA OF RIGHT EYE, MODERATE STAGE: Primary | ICD-10-CM

## 2021-10-21 DIAGNOSIS — Z96.1 PSEUDOPHAKIA, BOTH EYES: ICD-10-CM

## 2021-10-21 PROCEDURE — 1159F MED LIST DOCD IN RCRD: CPT | Mod: CPTII,S$GLB,, | Performed by: OPHTHALMOLOGY

## 2021-10-21 PROCEDURE — 76514 ECHO EXAM OF EYE THICKNESS: CPT | Mod: S$GLB,,, | Performed by: OPHTHALMOLOGY

## 2021-10-21 PROCEDURE — 1159F PR MEDICATION LIST DOCUMENTED IN MEDICAL RECORD: ICD-10-PCS | Mod: CPTII,S$GLB,, | Performed by: OPHTHALMOLOGY

## 2021-10-21 PROCEDURE — 92020 GONIOSCOPY: CPT | Mod: S$GLB,,, | Performed by: OPHTHALMOLOGY

## 2021-10-21 PROCEDURE — 92004 COMPRE OPH EXAM NEW PT 1/>: CPT | Mod: S$GLB,,, | Performed by: OPHTHALMOLOGY

## 2021-10-21 PROCEDURE — 92004 PR EYE EXAM, NEW PATIENT,COMPREHESV: ICD-10-PCS | Mod: S$GLB,,, | Performed by: OPHTHALMOLOGY

## 2021-10-21 PROCEDURE — 99999 PR PBB SHADOW E&M-EST. PATIENT-LVL III: CPT | Mod: PBBFAC,,, | Performed by: OPHTHALMOLOGY

## 2021-10-21 PROCEDURE — 76514 PR  US, EYE, FOR CORNEAL THICKNESS: ICD-10-PCS | Mod: S$GLB,,, | Performed by: OPHTHALMOLOGY

## 2021-10-21 PROCEDURE — 92133 POSTERIOR SEGMENT OCT OPTIC NERVE(OCULAR COHERENCE TOMOGRAPHY) - OU - BOTH EYES: ICD-10-PCS | Mod: S$GLB,,, | Performed by: OPHTHALMOLOGY

## 2021-10-21 PROCEDURE — 1160F PR REVIEW ALL MEDS BY PRESCRIBER/CLIN PHARMACIST DOCUMENTED: ICD-10-PCS | Mod: CPTII,S$GLB,, | Performed by: OPHTHALMOLOGY

## 2021-10-21 PROCEDURE — 1160F RVW MEDS BY RX/DR IN RCRD: CPT | Mod: CPTII,S$GLB,, | Performed by: OPHTHALMOLOGY

## 2021-10-21 PROCEDURE — 92133 CPTRZD OPH DX IMG PST SGM ON: CPT | Mod: S$GLB,,, | Performed by: OPHTHALMOLOGY

## 2021-10-21 PROCEDURE — 99999 PR PBB SHADOW E&M-EST. PATIENT-LVL III: ICD-10-PCS | Mod: PBBFAC,,, | Performed by: OPHTHALMOLOGY

## 2021-10-21 PROCEDURE — 92020 PR SPECIAL EYE EVAL,GONIOSCOPY: ICD-10-PCS | Mod: S$GLB,,, | Performed by: OPHTHALMOLOGY

## 2021-10-21 RX ORDER — METHAZOLAMIDE 25 MG/1
25 TABLET ORAL EVERY MORNING
COMMUNITY
Start: 2021-10-12 | End: 2021-10-28

## 2021-10-21 RX ORDER — NEPAFENAC 3 MG/ML
1 SUSPENSION/ DROPS OPHTHALMIC NIGHTLY
COMMUNITY
End: 2021-11-01 | Stop reason: SDUPTHER

## 2021-10-21 RX ORDER — BRIMONIDINE TARTRATE 2 MG/ML
1 SOLUTION/ DROPS OPHTHALMIC 2 TIMES DAILY
COMMUNITY
End: 2021-10-28

## 2021-10-21 RX ORDER — FLUOROMETHOLONE ACETATE 1 MG/ML
1 SUSPENSION/ DROPS OPHTHALMIC EVERY MORNING
COMMUNITY
End: 2021-10-28

## 2021-10-21 RX ORDER — LATANOPROST 50 UG/ML
1 SOLUTION/ DROPS OPHTHALMIC NIGHTLY
Qty: 2.5 ML | Refills: 12 | Status: SHIPPED | OUTPATIENT
Start: 2021-10-21 | End: 2022-02-02

## 2021-10-21 RX ORDER — METHAZOLAMIDE 50 MG/1
50 TABLET ORAL NIGHTLY
COMMUNITY
End: 2021-10-28

## 2021-10-22 ENCOUNTER — TELEPHONE (OUTPATIENT)
Dept: OPHTHALMOLOGY | Facility: CLINIC | Age: 51
End: 2021-10-22
Payer: COMMERCIAL

## 2021-10-22 DIAGNOSIS — H40.1112 PRIMARY OPEN ANGLE GLAUCOMA OF RIGHT EYE, MODERATE STAGE: Primary | ICD-10-CM

## 2021-10-26 ENCOUNTER — TELEPHONE (OUTPATIENT)
Dept: OPHTHALMOLOGY | Facility: CLINIC | Age: 51
End: 2021-10-26
Payer: COMMERCIAL

## 2021-10-28 ENCOUNTER — OFFICE VISIT (OUTPATIENT)
Dept: OPHTHALMOLOGY | Facility: CLINIC | Age: 51
End: 2021-10-28
Payer: COMMERCIAL

## 2021-10-28 DIAGNOSIS — H40.1112 PRIMARY OPEN ANGLE GLAUCOMA OF RIGHT EYE, MODERATE STAGE: Primary | ICD-10-CM

## 2021-10-28 DIAGNOSIS — H40.041 STEROID RESPONDER, RIGHT EYE: ICD-10-CM

## 2021-10-28 DIAGNOSIS — Z83.511 FAMILY HISTORY OF GLAUCOMA: ICD-10-CM

## 2021-10-28 DIAGNOSIS — Z96.1 PSEUDOPHAKIA, BOTH EYES: ICD-10-CM

## 2021-10-28 DIAGNOSIS — H40.1121 PRIMARY OPEN-ANGLE GLAUCOMA, LEFT EYE, MILD STAGE: ICD-10-CM

## 2021-10-28 PROCEDURE — 1159F PR MEDICATION LIST DOCUMENTED IN MEDICAL RECORD: ICD-10-PCS | Mod: CPTII,S$GLB,, | Performed by: OPHTHALMOLOGY

## 2021-10-28 PROCEDURE — 1160F RVW MEDS BY RX/DR IN RCRD: CPT | Mod: CPTII,S$GLB,, | Performed by: OPHTHALMOLOGY

## 2021-10-28 PROCEDURE — 92012 INTRM OPH EXAM EST PATIENT: CPT | Mod: S$GLB,,, | Performed by: OPHTHALMOLOGY

## 2021-10-28 PROCEDURE — 1160F PR REVIEW ALL MEDS BY PRESCRIBER/CLIN PHARMACIST DOCUMENTED: ICD-10-PCS | Mod: CPTII,S$GLB,, | Performed by: OPHTHALMOLOGY

## 2021-10-28 PROCEDURE — 99999 PR PBB SHADOW E&M-EST. PATIENT-LVL II: ICD-10-PCS | Mod: PBBFAC,,, | Performed by: OPHTHALMOLOGY

## 2021-10-28 PROCEDURE — 92012 PR EYE EXAM, EST PATIENT,INTERMED: ICD-10-PCS | Mod: S$GLB,,, | Performed by: OPHTHALMOLOGY

## 2021-10-28 PROCEDURE — 99999 PR PBB SHADOW E&M-EST. PATIENT-LVL II: CPT | Mod: PBBFAC,,, | Performed by: OPHTHALMOLOGY

## 2021-10-28 PROCEDURE — 1159F MED LIST DOCD IN RCRD: CPT | Mod: CPTII,S$GLB,, | Performed by: OPHTHALMOLOGY

## 2021-10-28 RX ORDER — BRINZOLAMIDE/BRIMONIDINE TARTRATE 10; 2 MG/ML; MG/ML
1 SUSPENSION/ DROPS OPHTHALMIC 3 TIMES DAILY
Qty: 8 ML | Refills: 11 | Status: SHIPPED | OUTPATIENT
Start: 2021-10-28 | End: 2021-10-28

## 2021-10-28 RX ORDER — METHAZOLAMIDE 50 MG/1
50 TABLET ORAL 2 TIMES DAILY
Qty: 60 TABLET | Refills: 11 | Status: SHIPPED | OUTPATIENT
Start: 2021-10-28 | End: 2022-07-28

## 2021-11-01 DIAGNOSIS — Z98.890 POST-OPERATIVE STATE: Primary | ICD-10-CM

## 2021-11-01 RX ORDER — NEPAFENAC 3 MG/ML
SUSPENSION/ DROPS OPHTHALMIC
Qty: 1.7 ML | Refills: 6 | OUTPATIENT
Start: 2021-11-01

## 2021-11-01 RX ORDER — NEPAFENAC 3 MG/ML
1 SUSPENSION/ DROPS OPHTHALMIC 2 TIMES DAILY
Qty: 1.7 ML | Refills: 6 | Status: SHIPPED | OUTPATIENT
Start: 2021-11-01 | End: 2021-11-02 | Stop reason: ALTCHOICE

## 2021-11-02 RX ORDER — KETOROLAC TROMETHAMINE 5 MG/ML
1 SOLUTION OPHTHALMIC 2 TIMES DAILY
Qty: 5 ML | Refills: 3 | Status: SHIPPED | OUTPATIENT
Start: 2021-11-02 | End: 2022-02-02

## 2021-11-02 RX ORDER — KETOROLAC TROMETHAMINE 5 MG/ML
1 SOLUTION OPHTHALMIC 2 TIMES DAILY
COMMUNITY
Start: 2021-11-02 | End: 2021-11-02 | Stop reason: SDUPTHER

## 2021-11-04 ENCOUNTER — OFFICE VISIT (OUTPATIENT)
Dept: OPHTHALMOLOGY | Facility: CLINIC | Age: 51
End: 2021-11-04
Payer: COMMERCIAL

## 2021-11-04 DIAGNOSIS — Z83.511 FAMILY HISTORY OF GLAUCOMA: ICD-10-CM

## 2021-11-04 DIAGNOSIS — H40.1112 PRIMARY OPEN ANGLE GLAUCOMA OF RIGHT EYE, MODERATE STAGE: Primary | ICD-10-CM

## 2021-11-04 DIAGNOSIS — H40.1121 PRIMARY OPEN-ANGLE GLAUCOMA, LEFT EYE, MILD STAGE: ICD-10-CM

## 2021-11-04 DIAGNOSIS — H40.041 STEROID RESPONDER, RIGHT EYE: ICD-10-CM

## 2021-11-04 DIAGNOSIS — Z98.41 STATUS POST RIGHT CATARACT EXTRACTION: ICD-10-CM

## 2021-11-04 DIAGNOSIS — Z96.1 PSEUDOPHAKIA, BOTH EYES: ICD-10-CM

## 2021-11-04 PROCEDURE — 99999 PR PBB SHADOW E&M-EST. PATIENT-LVL III: CPT | Mod: PBBFAC,,, | Performed by: OPHTHALMOLOGY

## 2021-11-04 PROCEDURE — 1159F MED LIST DOCD IN RCRD: CPT | Mod: CPTII,S$GLB,, | Performed by: OPHTHALMOLOGY

## 2021-11-04 PROCEDURE — 1160F RVW MEDS BY RX/DR IN RCRD: CPT | Mod: CPTII,S$GLB,, | Performed by: OPHTHALMOLOGY

## 2021-11-04 PROCEDURE — 92012 INTRM OPH EXAM EST PATIENT: CPT | Mod: S$GLB,,, | Performed by: OPHTHALMOLOGY

## 2021-11-04 PROCEDURE — 92012 PR EYE EXAM, EST PATIENT,INTERMED: ICD-10-PCS | Mod: S$GLB,,, | Performed by: OPHTHALMOLOGY

## 2021-11-04 PROCEDURE — 1160F PR REVIEW ALL MEDS BY PRESCRIBER/CLIN PHARMACIST DOCUMENTED: ICD-10-PCS | Mod: CPTII,S$GLB,, | Performed by: OPHTHALMOLOGY

## 2021-11-04 PROCEDURE — 99999 PR PBB SHADOW E&M-EST. PATIENT-LVL III: ICD-10-PCS | Mod: PBBFAC,,, | Performed by: OPHTHALMOLOGY

## 2021-11-04 PROCEDURE — 1159F PR MEDICATION LIST DOCUMENTED IN MEDICAL RECORD: ICD-10-PCS | Mod: CPTII,S$GLB,, | Performed by: OPHTHALMOLOGY

## 2021-11-04 RX ORDER — NEPAFENAC 3 MG/ML
1 SUSPENSION/ DROPS OPHTHALMIC 2 TIMES DAILY
Qty: 3 ML | Refills: 2 | Status: SHIPPED | OUTPATIENT
Start: 2021-11-04 | End: 2022-02-02

## 2021-11-08 ENCOUNTER — TELEPHONE (OUTPATIENT)
Dept: OPHTHALMOLOGY | Facility: CLINIC | Age: 51
End: 2021-11-08
Payer: COMMERCIAL

## 2021-11-11 ENCOUNTER — PATIENT MESSAGE (OUTPATIENT)
Dept: OPHTHALMOLOGY | Facility: CLINIC | Age: 51
End: 2021-11-11
Payer: COMMERCIAL

## 2021-11-19 ENCOUNTER — PATIENT MESSAGE (OUTPATIENT)
Dept: GASTROENTEROLOGY | Facility: CLINIC | Age: 51
End: 2021-11-19
Payer: COMMERCIAL

## 2021-11-23 ENCOUNTER — PROCEDURE VISIT (OUTPATIENT)
Dept: DERMATOLOGY | Facility: CLINIC | Age: 51
End: 2021-11-23
Payer: COMMERCIAL

## 2021-11-23 DIAGNOSIS — D22.9 BENIGN MOLE: ICD-10-CM

## 2021-11-23 DIAGNOSIS — L82.1 SK (SEBORRHEIC KERATOSIS): Primary | ICD-10-CM

## 2021-11-23 PROCEDURE — 99499 NO LOS: ICD-10-PCS | Mod: ,,, | Performed by: DERMATOLOGY

## 2021-11-23 PROCEDURE — 99499 UNLISTED E&M SERVICE: CPT | Mod: ,,, | Performed by: DERMATOLOGY

## 2021-11-23 PROCEDURE — 17110 PR DESTRUCTION BENIGN LESIONS UP TO 14: ICD-10-PCS | Mod: CSM,S$GLB,, | Performed by: DERMATOLOGY

## 2021-11-23 PROCEDURE — 17110 DESTRUCTION B9 LES UP TO 14: CPT | Mod: CSM,S$GLB,, | Performed by: DERMATOLOGY

## 2021-12-06 ENCOUNTER — PATIENT MESSAGE (OUTPATIENT)
Dept: GASTROENTEROLOGY | Facility: CLINIC | Age: 51
End: 2021-12-06
Payer: COMMERCIAL

## 2021-12-06 ENCOUNTER — TELEPHONE (OUTPATIENT)
Dept: ENDOSCOPY | Facility: HOSPITAL | Age: 51
End: 2021-12-06
Payer: COMMERCIAL

## 2021-12-09 ENCOUNTER — PATIENT MESSAGE (OUTPATIENT)
Dept: ENDOSCOPY | Facility: HOSPITAL | Age: 51
End: 2021-12-09
Payer: COMMERCIAL

## 2021-12-28 ENCOUNTER — TELEPHONE (OUTPATIENT)
Dept: OPHTHALMOLOGY | Facility: CLINIC | Age: 51
End: 2021-12-28
Payer: COMMERCIAL

## 2021-12-29 ENCOUNTER — TELEPHONE (OUTPATIENT)
Dept: GASTROENTEROLOGY | Facility: CLINIC | Age: 51
End: 2021-12-29
Payer: COMMERCIAL

## 2022-01-13 ENCOUNTER — TELEPHONE (OUTPATIENT)
Dept: GASTROENTEROLOGY | Facility: CLINIC | Age: 52
End: 2022-01-13
Payer: COMMERCIAL

## 2022-01-13 NOTE — TELEPHONE ENCOUNTER
Spoke to pt schedule f/u 2wks after colo. Pt verbalizes understanding. Endo  # given to confirm covid testing dates. No other concerns at this time.

## 2022-01-16 ENCOUNTER — LAB VISIT (OUTPATIENT)
Dept: PRIMARY CARE CLINIC | Facility: CLINIC | Age: 52
End: 2022-01-16
Payer: COMMERCIAL

## 2022-01-16 DIAGNOSIS — Z01.818 PRE-OP TESTING: ICD-10-CM

## 2022-01-16 PROCEDURE — U0005 INFEC AGEN DETEC AMPLI PROBE: HCPCS | Performed by: FAMILY MEDICINE

## 2022-01-16 PROCEDURE — U0003 INFECTIOUS AGENT DETECTION BY NUCLEIC ACID (DNA OR RNA); SEVERE ACUTE RESPIRATORY SYNDROME CORONAVIRUS 2 (SARS-COV-2) (CORONAVIRUS DISEASE [COVID-19]), AMPLIFIED PROBE TECHNIQUE, MAKING USE OF HIGH THROUGHPUT TECHNOLOGIES AS DESCRIBED BY CMS-2020-01-R: HCPCS | Performed by: FAMILY MEDICINE

## 2022-01-18 LAB
SARS-COV-2 RNA RESP QL NAA+PROBE: NOT DETECTED
SARS-COV-2- CYCLE NUMBER: NORMAL

## 2022-01-19 ENCOUNTER — HOSPITAL ENCOUNTER (OUTPATIENT)
Facility: HOSPITAL | Age: 52
Discharge: HOME | End: 2022-01-19
Attending: INTERNAL MEDICINE | Admitting: INTERNAL MEDICINE
Payer: COMMERCIAL

## 2022-01-19 ENCOUNTER — ANESTHESIA EVENT (OUTPATIENT)
Dept: ENDOSCOPY | Facility: HOSPITAL | Age: 52
End: 2022-01-19
Payer: COMMERCIAL

## 2022-01-19 ENCOUNTER — ANESTHESIA (OUTPATIENT)
Dept: ENDOSCOPY | Facility: HOSPITAL | Age: 52
End: 2022-01-19
Payer: COMMERCIAL

## 2022-01-19 VITALS
SYSTOLIC BLOOD PRESSURE: 120 MMHG | RESPIRATION RATE: 16 BRPM | WEIGHT: 117 LBS | DIASTOLIC BLOOD PRESSURE: 67 MMHG | HEIGHT: 65 IN | OXYGEN SATURATION: 98 % | HEART RATE: 55 BPM | TEMPERATURE: 98 F | BODY MASS INDEX: 19.49 KG/M2

## 2022-01-19 DIAGNOSIS — K51.90 ULCERATIVE COLITIS: ICD-10-CM

## 2022-01-19 PROCEDURE — 45380 COLONOSCOPY AND BIOPSY: CPT | Performed by: INTERNAL MEDICINE

## 2022-01-19 PROCEDURE — 88342 IMHCHEM/IMCYTCHM 1ST ANTB: CPT | Mod: 26,,, | Performed by: STUDENT IN AN ORGANIZED HEALTH CARE EDUCATION/TRAINING PROGRAM

## 2022-01-19 PROCEDURE — 88342 CHG IMMUNOCYTOCHEMISTRY: ICD-10-PCS | Mod: 26,,, | Performed by: STUDENT IN AN ORGANIZED HEALTH CARE EDUCATION/TRAINING PROGRAM

## 2022-01-19 PROCEDURE — 88305 TISSUE EXAM BY PATHOLOGIST: CPT | Mod: 26,,, | Performed by: STUDENT IN AN ORGANIZED HEALTH CARE EDUCATION/TRAINING PROGRAM

## 2022-01-19 PROCEDURE — 37000009 HC ANESTHESIA EA ADD 15 MINS: Performed by: INTERNAL MEDICINE

## 2022-01-19 PROCEDURE — 88305 TISSUE EXAM BY PATHOLOGIST: CPT | Mod: 59 | Performed by: STUDENT IN AN ORGANIZED HEALTH CARE EDUCATION/TRAINING PROGRAM

## 2022-01-19 PROCEDURE — 88305 TISSUE EXAM BY PATHOLOGIST: ICD-10-PCS | Mod: 26,,, | Performed by: STUDENT IN AN ORGANIZED HEALTH CARE EDUCATION/TRAINING PROGRAM

## 2022-01-19 PROCEDURE — 88342 IMHCHEM/IMCYTCHM 1ST ANTB: CPT | Performed by: STUDENT IN AN ORGANIZED HEALTH CARE EDUCATION/TRAINING PROGRAM

## 2022-01-19 PROCEDURE — E9220 PRA ENDO ANESTHESIA: HCPCS | Mod: 33,,, | Performed by: NURSE ANESTHETIST, CERTIFIED REGISTERED

## 2022-01-19 PROCEDURE — 27201012 HC FORCEPS, HOT/COLD, DISP: Performed by: INTERNAL MEDICINE

## 2022-01-19 PROCEDURE — 63600175 PHARM REV CODE 636 W HCPCS: Performed by: NURSE ANESTHETIST, CERTIFIED REGISTERED

## 2022-01-19 PROCEDURE — E9220 PRA ENDO ANESTHESIA: ICD-10-PCS | Mod: 33,,, | Performed by: NURSE ANESTHETIST, CERTIFIED REGISTERED

## 2022-01-19 PROCEDURE — 45380 COLONOSCOPY AND BIOPSY: CPT | Mod: 33,,, | Performed by: INTERNAL MEDICINE

## 2022-01-19 PROCEDURE — 45380 PR COLONOSCOPY,BIOPSY: ICD-10-PCS | Mod: 33,,, | Performed by: INTERNAL MEDICINE

## 2022-01-19 PROCEDURE — 37000008 HC ANESTHESIA 1ST 15 MINUTES: Performed by: INTERNAL MEDICINE

## 2022-01-19 PROCEDURE — 25000003 PHARM REV CODE 250: Performed by: INTERNAL MEDICINE

## 2022-01-19 RX ORDER — BRIMONIDINE TARTRATE, TIMOLOL MALEATE 2; 5 MG/ML; MG/ML
1 SOLUTION/ DROPS OPHTHALMIC 2 TIMES DAILY
COMMUNITY
End: 2022-09-22

## 2022-01-19 RX ORDER — LIDOCAINE HCL/PF 100 MG/5ML
SYRINGE (ML) INTRAVENOUS
Status: DISCONTINUED | OUTPATIENT
Start: 2022-01-19 | End: 2022-01-19

## 2022-01-19 RX ORDER — PROPOFOL 10 MG/ML
VIAL (ML) INTRAVENOUS CONTINUOUS PRN
Status: DISCONTINUED | OUTPATIENT
Start: 2022-01-19 | End: 2022-01-19

## 2022-01-19 RX ORDER — PROPOFOL 10 MG/ML
VIAL (ML) INTRAVENOUS
Status: DISCONTINUED | OUTPATIENT
Start: 2022-01-19 | End: 2022-01-19

## 2022-01-19 RX ORDER — SODIUM CHLORIDE 9 MG/ML
INJECTION, SOLUTION INTRAVENOUS CONTINUOUS
Status: DISCONTINUED | OUTPATIENT
Start: 2022-01-19 | End: 2022-01-19 | Stop reason: HOSPADM

## 2022-01-19 RX ADMIN — SODIUM CHLORIDE: 0.9 INJECTION, SOLUTION INTRAVENOUS at 10:01

## 2022-01-19 RX ADMIN — PROPOFOL 150 MCG/KG/MIN: 10 INJECTION, EMULSION INTRAVENOUS at 10:01

## 2022-01-19 RX ADMIN — Medication 100 MG: at 10:01

## 2022-01-19 RX ADMIN — PROPOFOL 80 MG: 10 INJECTION, EMULSION INTRAVENOUS at 10:01

## 2022-01-19 NOTE — ANESTHESIA PREPROCEDURE EVALUATION
01/19/2022  Raiza Cherry is a 52 y.o., female.    Past Medical History:   Diagnosis Date    Abnormal Pap smear of cervix     LEEP 2016, 9-2017 LGSIL, colpo  mildly abnl cells,     Cataract     Chronic diarrhea     Glaucoma     Insomnia     Ulcerative colitis     proctitis     Patient Active Problem List   Diagnosis    Glaucoma    Ulcerative colitis     Past Surgical History:   Procedure Laterality Date    AUGMENTATION OF BREAST      implants were deflated by a doctor and will be removed    BREAST SURGERY  2014, 2018    enlargement and lift of both breasts    CATARACT EXTRACTION W/  INTRAOCULAR LENS IMPLANT Right 10/06/2021    DR.ELLEN BULLOCK    CATARACT EXTRACTION W/  INTRAOCULAR LENS IMPLANT Left 2013    DR.ELLEN BULLOCK    CERVICAL BIOPSY  W/ LOOP ELECTRODE EXCISION  02/2016    Moderate dysplasia completely excised margins clear and ECC negative         Anesthesia Evaluation    I have reviewed the Patient Summary Reports.      I have reviewed the Medications.     Review of Systems  Anesthesia Hx:  Denies Family Hx of Anesthesia complications.   Denies Personal Hx of Anesthesia complications.   Hematology/Oncology:  Hematology Normal   Oncology Normal     EENT/Dental:EENT/Dental Normal   Cardiovascular:  Cardiovascular Normal     Pulmonary:  Pulmonary Normal    Renal/:  Renal/ Normal     Hepatic/GI:   PUD,    Musculoskeletal:  Musculoskeletal Normal    Neurological:  Neurology Normal    Endocrine:  Endocrine Normal    Dermatological:  Skin Normal    Psych:  Psychiatric Normal           Physical Exam  General:  Well nourished    Airway/Jaw/Neck:  Airway Findings: Mouth Opening: Normal Tongue: Normal  General Airway Assessment: Adult  Mallampati: II  TM Distance: Normal, at least 6 cm  Jaw/Neck Findings:  Neck ROM: Normal ROM  Neck Findings: Normal     Dental:  Dental  Findings: In tact   Chest/Lungs:  Chest/Lungs Clear    Heart/Vascular:  Heart Findings: Normal    Abdomen:  Abdomen Findings: Normal      Mental Status:  Mental Status Findings: Normal        Anesthesia Plan  Type of Anesthesia, risks & benefits discussed:  Anesthesia Type:  general    Patient's Preference:   Plan Factors:          Intra-op Monitoring Plan: standard ASA monitors  Intra-op Monitoring Plan Comments:   Post Op Pain Control Plan:   Post Op Pain Control Plan Comments:     Induction:   IV  Beta Blocker:  Patient is not currently on a Beta-Blocker (No further documentation required).       Informed Consent: Patient understands risks and agrees with Anesthesia plan.  Questions answered. Anesthesia consent signed with patient.  ASA Score: 2     Day of Surgery Review of History & Physical:    H&P update referred to the provider.         Ready For Surgery From Anesthesia Perspective.

## 2022-01-19 NOTE — PROVATION PATIENT INSTRUCTIONS
Discharge Summary/Instructions after an Endoscopic Procedure  Patient Name: Raiza Cherry  Patient MRN: [id number]  Patient YOB: 1970 Wednesday, January 19, 2022  Bran Breaux MD  Dear patient,  As a result of recent federal legislation (The Federal Cures Act), you may   receive lab or pathology results from your procedure in your [facility]   account before your physician is able to contact you. Your physician or   their representative will relay the results to you with their   recommendations at their soonest availability.  Thank you,  RESTRICTIONS:  During your procedure today, you received medications for sedation.  These   medications may affect your judgment, balance and coordination.  Therefore,   for 24 hours, you have the following restrictions:   - DO NOT drive a car, operate machinery, make legal/financial decisions,   sign important papers or drink alcohol.    ACTIVITY:  Today: no heavy lifting, straining or running due to procedural   sedation/anesthesia.  The following day: return to full activity including work.  DIET:  Eat and drink normally unless instructed otherwise.     TREATMENT FOR COMMON SIDE EFFECTS:  - Mild abdominal pain, nausea, belching, bloating or excessive gas:  rest,   eat lightly and use a heating pad.  - Sore Throat: treat with throat lozenges and/or gargle with warm salt   water.  - Because air was used during the procedure, expelling large amounts of air   from your rectum or belching is normal.  - If a bowel prep was taken, you may not have a bowel movement for 1-3 days.    This is normal.  SYMPTOMS TO WATCH FOR AND REPORT TO YOUR PHYSICIAN:  1. Abdominal pain or bloating, other than gas cramps.  2. Chest pain.  3. Back pain.  4. Signs of infection such as: chills or fever occurring within 24 hours   after the procedure.  5. Rectal bleeding, which would show as bright red, maroon, or black stools.   (A tablespoon of blood from the rectum is not serious, especially  if   hemorrhoids are present.)  6. Vomiting.  7. Weakness or dizziness.  GO DIRECTLY TO THE NEAREST EMERGENCY ROOM IF YOU HAVE ANY OF THE FOLLOWING:      Difficulty breathing              Chills and/or fever over 101 F   Persistent vomiting and/or vomiting blood   Severe abdominal pain   Severe chest pain   Black, tarry stools   Bleeding- more than one tablespoon   Any other symptom or condition that you feel may need urgent attention  Your doctor recommends these additional instructions:  If any biopsies were taken, your doctors clinic will contact you in 1 to 2   weeks with any results.  - Discharge patient to home.   - Resume previous diet today.   - Continue present medications.   - Await pathology results.   - Repeat colonoscopy in 1 year for surveillance.   - Return to my office as previously scheduled.   - Given the findings on today's exam I recommend restarting Lialda   (mesalamine) 4.8g daily as well as going back to taking Canasa   suppositories daily.  - Patient has a contact number available for emergencies.  The signs and   symptoms of potential delayed complications were discussed with the   patient.  Return to normal activities tomorrow.  Written discharge   instructions were provided to the patient.  For questions, problems or results please call your physician - Bran Breaux MD at Work:  [phone].  [facility] [place], EMERGENCY ROOM PHONE NUMBER: [phone]  IF A COMPLICATION OR EMERGENCY SITUATION ARISES AND YOU ARE UNABLE TO REACH   YOUR PHYSICIAN - GO DIRECTLY TO THE EMERGENCY ROOM.  Bran Breaux MD  1/19/2022 10:38:13 AM  This report has been verified and signed electronically.  Dear patient,  As a result of recent federal legislation (The Federal Cures Act), you may   receive lab or pathology results from your procedure in your [facility]   account before your physician is able to contact you. Your physician or   their representative will relay the results to you with  their   recommendations at their soonest availability.  Thank you,  PROVATION

## 2022-01-19 NOTE — H&P
Short Stay Endoscopy History and Physical    PCP - Per Saab MD    Procedure - Colonoscopy  ASA - 2  Mallampati - per anesthesia  History of Anesthesia problems - no  Family history Anesthesia problems -  no     HPI:  This is a 52 y.o. female here for evaluation of :     Average Risk Screening: No  High risk screening: yes  History of polyps: No  Anemia: No  Blood in stools: No  Diarrhea: No  Abdominal Pain: No  Other: UC    Review of Systems:  CONSTITUTIONAL: Denies weight change,  fatigue, fevers, chills, night sweats.  CARDIOVASCULAR: Denies chest pain, shortness of breath, orthopnea and edema.  RESPIRATORY: Denies cough, hemoptysis, dyspnea, and wheezing.  GI: See HPI.    Medical History:  Past Medical History:   Diagnosis Date    Abnormal Pap smear of cervix     LEEP 2016, 9-2017 LGSIL, colpo  mildly abnl cells,     Cataract     Chronic diarrhea     Glaucoma     Insomnia     Ulcerative colitis     proctitis       Surgical History:   Past Surgical History:   Procedure Laterality Date    AUGMENTATION OF BREAST      implants were deflated by a doctor and will be removed    BREAST SURGERY  2014, 2018    enlargement and lift of both breasts    CATARACT EXTRACTION W/  INTRAOCULAR LENS IMPLANT Right 10/06/2021    DR.ELLEN BULLOCK    CATARACT EXTRACTION W/  INTRAOCULAR LENS IMPLANT Left 2013    DR.ELLEN BULLOCK    CERVICAL BIOPSY  W/ LOOP ELECTRODE EXCISION  02/2016    Moderate dysplasia completely excised margins clear and ECC negative       Family History:   Family History   Problem Relation Age of Onset    Lung disease Father     Glaucoma Father     Diabetes Mother     Heart disease Mother     Lupus Sister     No Known Problems Sister     No Known Problems Sister     No Known Problems Sister     Breast cancer Neg Hx     Colon cancer Neg Hx     Ovarian cancer Neg Hx     Cancer Neg Hx     Amblyopia Neg Hx     Blindness Neg Hx     Macular degeneration Neg Hx     Retinal  detachment Neg Hx     Strabismus Neg Hx        Social History:   Social History     Tobacco Use    Smoking status: Never Smoker    Smokeless tobacco: Never Used   Substance Use Topics    Alcohol use: Yes     Comment: Rarely    Drug use: No       Allergies: Reviewed.    Medications:  No current facility-administered medications on file prior to encounter.     Current Outpatient Medications on File Prior to Encounter   Medication Sig Dispense Refill    ascorbic acid, vitamin C, (VITAMIN C) 100 MG tablet Take 100 mg by mouth once daily.      brimonidine-timoloL (COMBIGAN) 0.2-0.5 % Drop Place 1 drop into both eyes 2 (two) times daily.      fluocinonide (LIDEX) 0.05 % external solution 1 application once daily. Apply to scalp   Pt uses PRN      mesalamine (LIALDA) 1.2 gram TbEC Take 4 tablets (4.8 g total) by mouth daily with breakfast. 360 tablet 3    MULTIVIT,CALC,MINS/IRON/FOLIC (ONE-A-DAY WOMENS FORMULA ORAL) Take 1 tablet by mouth once daily.      timolol maleate 0.5% (TIMOPTIC) 0.5 % Drop Place 1 drop into both eyes 2 (two) times daily.   6    vitamin C-biotin 50 mg -1,250 mcg Chew Take 1 tablet by mouth once daily.         Physical Exam:  Vital Signs:   Vitals:    01/19/22 0914   BP: 139/82   Pulse: 65   Resp: 14   Temp: 98.1 °F (36.7 °C)     General Appearance: Well appearing in no acute distress  ENT: OP clear  Chest: CTA B  CV: RRR, no m/r/g  Abd: s/nt/nd/nabs  Ext: no edema    Labs:  Reviewed    IMPRESSION:  Patient Active Problem List   Diagnosis    Glaucoma    Ulcerative colitis       Plan:  I have explained the risks and benefits of colonoscopy to the patient including but not limited to bleeding, perforation, infection, and death. The patient wishes to proceed with colonoscopy.

## 2022-01-19 NOTE — DISCHARGE INSTRUCTIONS
Patient Education       Colonoscopy   Why is this procedure done?   Colonoscopy is done so your doctor can see the inside of your large intestines, also called your colon, and your rectum. It uses a lighted tube called a scope, which has a tiny camera that can be moved through the large intestine. This may be done to:  · Check for colon cancer or growths called polyps  · Look for the source of rectal bleeding  · Find the cause of changes in your bowel movements  · Find the cause of belly or rectal pain  · Check results from other tests  · Check your response to treatment for other diseases  · Learn about weight loss  What happens before the procedure?   · Your doctor will ask you about your health history and do an exam. The doctor may order tests for your stool. Talk to the doctor about  ? All the drugs you are taking. Be sure to include all prescription and over-the-counter (OTC) drugs, and herbal supplements. Tell the doctor about any drug allergy. Bring a list of drugs you take with you.  ? Any bleeding problems. Be sure to tell your doctor if you are taking any drugs that may cause bleeding. Some of these are warfarin, rivaroxaban, apixaban, ticagrelor, clopidogrel, ketorolac, ibuprofen, naproxen, or aspirin. Certain vitamins and herbs, such as garlic and fish oil, may also add to the risk for bleeding. You may need to stop these drugs as well. Talk to your doctor about them.  · The colon needs to be cleaned out before this test. Your doctor will tell you to take drugs that will cause watery loose stools. These may be liquids, pills, or both. You may need to take these the day before and the day of your test.  · You will be placed on a clear liquid diet the day before the exam and you will need to only have clear liquids until the test is done. Clear liquids include water, sports drinks, broth, soft drinks, and juices, but avoid anything that is red or purple in color. Do not drink alcohol.  · Your doctor may  ask you not to eat or drink any food other than the drugs or liquids that clean out your colon.  · You will not be allowed to drive after the procedure. Ask a family member or a friend to help you get home and stay with you if possible.  What happens during the procedure?   · The staff will put an IV in your arm to give you fluids and drugs. You may be given a drug to make you sleepy.  · You will lie on your side with your knees bent and pulled up toward your chest.  · The doctor will use a small thin tube, called a scope, with a light and a camera on it. The tube is put into your anus and moved through your rectum and into the large intestine or colon.  · Small amounts of air are put into your colon. The camera lets your doctor look at the lining of your colon.  · Your doctor may take small tissue samples and remove small growths.     · The tube is then taken out.  · The procedure may take 30 to 45 minutes.  What happens after the procedure?   · You will go to a recovery area and the staff will watch you closely.  · You will want to rest after your procedure.  · You may feel groggy.  · You should be able to eat your usual diet after the test.  · You will have gas and you may have mild cramping. This is normal.  · A small amount of bleeding may happen during the first few days after your procedure.  · If tissue was removed, it will be sent to a lab to be checked. Your doctor will tell you the results after a week or two.  What problems could happen?   · Tear inside your colon  · Bleeding can happen for a few days afterwards  Where can I learn more?   American Cancer Society  https://www.cancer.org/cancer/colon-rectal-cancer/bunpveoww-xmqzyhwlz-sjslvwc/atglcyrhp-awhjq-aqvd.html   American Society of Clinical Oncology  https://www.cancer.net/navigating-cancer-care/diagnosing-cancer/tests-and-procedures/colonoscopy   Last Reviewed Date   2021-03-10  Consumer Information Use and Disclaimer   This information is not  specific medical advice and does not replace information you receive from your health care provider. This is only a brief summary of general information. It does NOT include all information about conditions, illnesses, injuries, tests, procedures, treatments, therapies, discharge instructions or life-style choices that may apply to you. You must talk with your health care provider for complete information about your health and treatment options. This information should not be used to decide whether or not to accept your health care providers advice, instructions or recommendations. Only your health care provider has the knowledge and training to provide advice that is right for you.  Copyright   Copyright © 2021 [employer] and its affiliates and/or licensors. All rights reserved.

## 2022-01-19 NOTE — ANESTHESIA POSTPROCEDURE EVALUATION
Anesthesia Post Evaluation    Patient: Raiza Cherry    Procedure(s) Performed: Procedure(s) (LRB):  COLONOSCOPY (N/A)    Final Anesthesia Type: general      Patient location during evaluation: GI PACU  Patient participation: Yes- Able to Participate  Level of consciousness: awake and oriented  Post-procedure vital signs: reviewed and stable  Pain management: adequate  Airway patency: patent    PONV status at discharge: No PONV  Anesthetic complications: no      Cardiovascular status: stable  Respiratory status: unassisted, spontaneous ventilation and room air  Hydration status: euvolemic  Follow-up not needed.          Vitals Value Taken Time   /67 01/19/22 1106   Temp 36.7 °C (98.1 °F) 01/19/22 1041   Pulse 55 01/19/22 1106   Resp 16 01/19/22 1106   SpO2 98 % 01/19/22 1106         Event Time   Out of Recovery 11:14:04         Pain/Kourtney Score: Kourtney Score: 10 (1/19/2022 10:42 AM)

## 2022-01-19 NOTE — TRANSFER OF CARE
"Anesthesia Transfer of Care Note    Patient: Raiza Cherry    Procedure(s) Performed: Procedure(s) (LRB):  COLONOSCOPY (N/A)    Patient location: PACU    Anesthesia Type: general    Transport from OR: Transported from OR on room air with adequate spontaneous ventilation    Post pain: adequate analgesia    Post assessment: no apparent anesthetic complications    Post vital signs: stable    Level of consciousness: awake    Nausea/Vomiting: no nausea/vomiting    Complications: none    Transfer of care protocol was followed      Last vitals:   Visit Vitals  BP (!) 109/58 (BP Location: Left arm, Patient Position: Lying)   Pulse (!) 59   Temp 36.7 °C (98.1 °F) (Oral)   Resp 16   Ht 5' 5" (1.651 m)   Wt 53.1 kg (117 lb)   LMP 04/13/2018   SpO2 100%   Breastfeeding No   BMI 19.47 kg/m²     "

## 2022-01-30 LAB
FINAL PATHOLOGIC DIAGNOSIS: NORMAL
GROSS: NORMAL
Lab: NORMAL
MICROSCOPIC EXAM: NORMAL

## 2022-02-02 ENCOUNTER — OFFICE VISIT (OUTPATIENT)
Dept: GASTROENTEROLOGY | Facility: CLINIC | Age: 52
End: 2022-02-02
Payer: COMMERCIAL

## 2022-02-02 DIAGNOSIS — K51.30 ULCERATIVE RECTOSIGMOIDITIS WITHOUT COMPLICATION: Primary | ICD-10-CM

## 2022-02-02 PROCEDURE — 1159F MED LIST DOCD IN RCRD: CPT | Mod: CPTII,95,, | Performed by: INTERNAL MEDICINE

## 2022-02-02 PROCEDURE — 1160F RVW MEDS BY RX/DR IN RCRD: CPT | Mod: CPTII,95,, | Performed by: INTERNAL MEDICINE

## 2022-02-02 PROCEDURE — 1159F PR MEDICATION LIST DOCUMENTED IN MEDICAL RECORD: ICD-10-PCS | Mod: CPTII,95,, | Performed by: INTERNAL MEDICINE

## 2022-02-02 PROCEDURE — 99214 PR OFFICE/OUTPT VISIT, EST, LEVL IV, 30-39 MIN: ICD-10-PCS | Mod: 95,,, | Performed by: INTERNAL MEDICINE

## 2022-02-02 PROCEDURE — 99214 OFFICE O/P EST MOD 30 MIN: CPT | Mod: 95,,, | Performed by: INTERNAL MEDICINE

## 2022-02-02 PROCEDURE — 1160F PR REVIEW ALL MEDS BY PRESCRIBER/CLIN PHARMACIST DOCUMENTED: ICD-10-PCS | Mod: CPTII,95,, | Performed by: INTERNAL MEDICINE

## 2022-02-02 RX ORDER — NETARSUDIL AND LATANOPROST OPHTHALMIC SOLUTION, 0.02%/0.005% .2; .05 MG/ML; MG/ML
SOLUTION/ DROPS OPHTHALMIC; TOPICAL
COMMUNITY
Start: 2022-01-12 | End: 2022-07-28

## 2022-02-02 NOTE — PROGRESS NOTES
[facility]          Inflammatory Bowel Disease Follow Up Consultation Note         TODAY'S VISIT DATE:  2/2/2022    Reason for Consult:    Chief Complaint   Patient presents with    Ulcerative Colitis       PCP: Per Saab      Referring MD:   No ref. provider found    History of Present Illness:  Raiza Cherry who is a 52 y.o. female is being seen today at the [facility] on 02/02/2022 for inflammatory bowel disease- ulcerative colitis.  She is here for a follow-up today.  She had a colonoscopy about 2 weeks ago that showed active inflammation in the left colon.  At that time she informed me that she had stopped taking Lialda a few months back.  Today she reported that even when she was taking Lialda she was not taking it consistently.  She has been taking Canasa suppositories regularly.  She reports that after his recent change in some of her eyedrops a lot of her gastrointestinal issues resolved.  She states that she is having 1-2 formed bowel movements daily with no blood in the stools and no abdominal pain.    IBD History:  She has a history of left-sided ulcerative colitis.  This was diagnosed around 1990.  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  She has been managed with multiple 5 ASA products (both oral and rectal) as well as prednisone (tolerated very poorly) and Entocort. Her last colonoscopy was in October 2018 at which time there was some mild proctitis and some atrophy in the left colon.  Earlier this year she saw Dr. Gli because of active symptoms.  She started her on Lialda 4.8 g daily and Canasa suppositories twice daily initially.  After she was doing better these were reduced down to once daily.  Eventually she stop the Canasa suppositories and was only taking Lialda 4.8  g daily.  She noted a significant increase in her symptoms after being off of the suppositories.  In January 2022 she underwent colonoscopy that showed active inflammation of the left colon.  At that time she reported that she was not taking Lialda for the last few months and was only taking Canasa suppositories.    IBD Details:  Dx Date:  1990  Disease type/distribution:  Ulcerative colitis/left colon disease extending to the descending colon  Current Treatment:  Lialda 4.8 g daily/Canasa suppositories daily  Start Date:  July 2020  Response:  Incomplete  Optimized:  Yes  Adverse reactions:  None  Prior surgeries:  None  CRP Elevation:  No  Disease Complications:  None  Extraintestinal manifestations:  None  Prior treatments:   Steroids:  Good response  5ASA:  Good response  IMM:  None  TNF Inh:  None   Anti-Integrin:  None   IL 12/23:  None  ELIZABETH Inh:  None    Previous Clinical Trials:  None    Last Colonoscopy:   January 2022-inflammation in the rectum, sigmoid, descending colon, otherwise normal     October 2018-rectal inflammation, atrophy of the left colon, otherwise normal    Other Endoscopies:  None    Imaging:   MRE:  None   CT:  None   Other:  None    Pertinent Labs:  Lab Results   Component Value Date    SEDRATE 6 06/12/2019    CRP 0.6 10/13/2021     Lab Results   Component Value Date    TTGIGA 7 10/13/2021     10/13/2021     Lab Results   Component Value Date    TSH 3.022 10/13/2021    FREET4 0.84 10/13/2021     Lab Results   Component Value Date    VIDDXUWZ35WI 59 10/13/2021    LDEFEVOW67 >2000 (H) 10/13/2021     Lab Results   Component Value Date    HEPBSAG Negative 10/13/2021    HEPBCAB Negative 10/13/2021    HEPCAB Negative 10/13/2021     Lab Results   Component Value Date    RQB65AGNO Negative 10/13/2021     Lab Results   Component Value Date    NIL 0.030 10/13/2021    MITOGENNIL >10.000 10/13/2021     Lab Results   Component Value Date    TPTMINTERP SEE BELOW 10/13/2021     Lab Results    Component Value Date    CDIFFICILEAN Negative 07/21/2020    CDIFFTOX Negative 07/21/2020     Lab Results   Component Value Date    CALPROTECTIN 81.3 (H) 07/21/2020       Therapeutic Drug Monitoring Labs:  No results found for: PROMETH  No results found for: ANSADAINIT, INFLIXIMAB, INFLIXINTERP    Vaccinations:  Lab Results   Component Value Date    HEPBSAB Negative 10/13/2021     Lab Results   Component Value Date    HEPAIGG Negative 10/13/2021     Lab Results   Component Value Date    VARICELLAZOS 4.03 (H) 10/13/2021    VARICELLAINT Positive (A) 10/13/2021     Immunization History   Administered Date(s) Administered    COVID-19, MRNA, LN-S, PF (Pfizer) (Purple Cap) 03/06/2021, 03/27/2021, 12/04/2021    Influenza 09/30/2019, 10/01/2020, 11/01/2021    Pneumococcal Conjugate - 13 Valent 10/15/2020    Tdap 09/23/2021         Review of Systems  Review of Systems   Constitutional: Negative for chills, fever and weight loss.   HENT: Negative for sore throat.    Eyes: Negative for pain, discharge and redness.   Respiratory: Negative for cough, shortness of breath and wheezing.    Cardiovascular: Negative for chest pain, orthopnea and leg swelling.   Gastrointestinal: Negative for abdominal pain, blood in stool, constipation, diarrhea, heartburn, melena, nausea and vomiting.   Genitourinary: Negative for dysuria, frequency and urgency.   Musculoskeletal: Negative for back pain, joint pain and myalgias.   Skin: Negative for itching and rash.   Neurological: Negative for focal weakness and seizures.   Endo/Heme/Allergies: Does not bruise/bleed easily.   Psychiatric/Behavioral: Negative for depression. The patient is not nervous/anxious.             Medical History:   Past Medical History:   Diagnosis Date    Abnormal Pap smear of cervix     LEEP 2016, 9-2017 LGSIL, colpo  mildly abnl cells,     Cataract     Chronic diarrhea     Glaucoma     Insomnia     Ulcerative colitis     proctitis       Surgical  History:  Past Surgical History:   Procedure Laterality Date    AUGMENTATION OF BREAST      implants were deflated by a doctor and will be removed    BREAST SURGERY  2014, 2018    enlargement and lift of both breasts    CATARACT EXTRACTION W/  INTRAOCULAR LENS IMPLANT Right 10/06/2021    DR.ELLEN BULLOCK    CATARACT EXTRACTION W/  INTRAOCULAR LENS IMPLANT Left 2013    DR.ELLEN BULLOCK    CERVICAL BIOPSY  W/ LOOP ELECTRODE EXCISION  02/2016    Moderate dysplasia completely excised margins clear and ECC negative    COLONOSCOPY N/A 1/19/2022    Procedure: COLONOSCOPY;  Surgeon: Bran Breaux MD;  Location: [facility] ([address] [place] [address]);  Service: Endoscopy;  Laterality: N/A;  fully vaccinated 3/27/21, instructions sent to [email or web address]      COVID test at [place] on 1/16-GT       Family History:   Family History   Problem Relation Age of Onset    Lung disease Father     Glaucoma Father     Diabetes Mother     Heart disease Mother     Lupus Sister     No Known Problems Sister     No Known Problems Sister     No Known Problems Sister     Breast cancer Neg Hx     Colon cancer Neg Hx     Ovarian cancer Neg Hx     Cancer Neg Hx     Amblyopia Neg Hx     Blindness Neg Hx     Macular degeneration Neg Hx     Retinal detachment Neg Hx     Strabismus Neg Hx        Social History:   Social History     Tobacco Use    Smoking status: Never Smoker    Smokeless tobacco: Never Used   Substance Use Topics    Alcohol use: Yes     Comment: Rarely    Drug use: No       Allergies: Reviewed    Home Medications:   Medication List with Changes/Refills   Current Medications    ASCORBIC ACID, VITAMIN C, (VITAMIN C) 100 MG TABLET    Take 100 mg by mouth once daily.    BRIMONIDINE-TIMOLOL (COMBIGAN) 0.2-0.5 % DROP    Place 1 drop into both eyes 2 (two) times daily.    MESALAMINE (CANASA) 1000 MG SUPP    PLACE 1 SUPPOSITORY (1,000 MG TOTAL) RECTALLY 2 (TWO) TIMES DAILY. (INSURANCE COVERS ONLY 1 DAILY)    MESALAMINE  (LIALDA) 1.2 GRAM TBEC    Take 4 tablets (4.8 g total) by mouth daily with breakfast.    METHAZOLAMIDE (NEPTAZANE) 50 MG TAB    Take 1 tablet (50 mg total) by mouth 2 (two) times daily.    MULTIVIT,CALC,MINS/IRON/FOLIC (ONE-A-DAY WOMENS FORMULA ORAL)    Take 1 tablet by mouth once daily.    NEPAFENAC (ILEVRO) 0.3 % DRPS    Place 1 drop into the right eye 2 (two) times a day.    ROCKLATAN 0.02-0.005 % DROP    Place into both eyes.    VITAMIN C-BIOTIN 50 MG -1,250 MCG CHEW    Take 1 tablet by mouth once daily.   Discontinued Medications    BRINZOLAMIDE-BRIMONIDINE (SIMBRINZA) 1-0.2 % DRPS    Place 1 drop into both eyes 2 (two) times a day.    FLUOCINONIDE (LIDEX) 0.05 % EXTERNAL SOLUTION    1 application once daily. Apply to scalp   Pt uses PRN    KETOROLAC 0.5% (ACULAR) 0.5 % DROP    Place 1 drop into the right eye 2 (two) times daily.    LATANOPROST 0.005 % OPHTHALMIC SOLUTION    Place 1 drop into the right eye every evening.    TIMOLOL MALEATE 0.5% (TIMOPTIC) 0.5 % DROP    Place 1 drop into both eyes 2 (two) times daily.        Physical Exam:  Vital Signs:  LMP 04/13/2018   There is no height or weight on file to calculate BMI.    Physical Exam   Constitutional: She is oriented to person, place, and time. She appears well-developed and well-nourished.   Neurological: She is alert and oriented to person, place, and time.   Psychiatric: She has a normal mood and affect. Her behavior is normal. Judgment and thought content normal.   Nursing note reviewed.    Telemedicine visit. Remainder of physical unable to be completed.    Labs: reviewed and pertinent noted above    Assessment/Plan:  Raiza Cherry is a 52 y.o. female with left-sided ulcerative colitis. The following issues were addresssed:    1. Ulcerative rectosigmoiditis without complication      1.  Ulcerative colitis:  With regards to symptoms she is doing quite well, but based on her colonoscopy she still had significant inflammation.  She has restarted  Lialda 4.8 g daily since the time of her colonoscopy.  She continues to take Canasa suppositories once daily.  Today I recommend that we check a baseline fecal calprotectin level so we can compare future results.  Will plan to follow up in about 3 months.  At that time it will be worthwhile to repeat the calprotectin if her baseline is elevated.  We discussed the importance of treating the inflammation and not basing treatment decisions solely on symptoms as symptoms do not always correlate with active inflammation and risks of complications from ulcerative colitis including risk of colon cancer is associated with more active disease.      # IBD specific health maintenance:  Colon cancer surveillance:  Up-to-date    Annual:  - Eye exam:  Not applicable  - Skin exam (if on IMM/TNF):  Not applicable  - reminded pt to use sunblock/hats/sunprotective clothing  - PAP (if immunosuppressed):  June 2021    DEXA:  Not applicable    Vitamin D:  Check in the future    Vaccines:    Influenza:  Scheduled October 23rd   Pneumovax:     PCV13:  Received    PSV23:  Ordered today   HAV:  Discuss in the future   HBV:  Discuss in the   Tdap:  Needs to be updated   MMR:  Immune   VZV:  Immune   HZV:  Ordered today   HPV:  Not applicable   Meningococcus:  Not applicable   COVID:  Completed series and booster      Follow up: Follow up in about 3 months (around 5/2/2022).    Thank you again for sending Raiza Cherry to see Dr. Shakir Breaux today at the [facility]. Please don't hesitate to contact Dr. Breaux if there are any questions regarding this evaluation, or if you have any other patients with inflammatory bowel disease for whom you would like a consultation. You can reach Dr. Breaux at [phone] or by email at [email or web address]    Bran Breaux MD  Department of Gastroenterology  Inflammatory Bowel Disease    The patient location is: [place], [place]  The chief complaint leading  to consultation is: UC    Visit type: audiovisual    Face to Face time with patient: 20 minutes  25 minutes of total time spent on the encounter, which includes face to face time and non-face to face time preparing to see the patient (eg, review of tests), Obtaining and/or reviewing separately obtained history, Documenting clinical information in the electronic or other health record, Independently interpreting results (not separately reported) and communicating results to the patient/family/caregiver, or Care coordination (not separately reported).         Each patient to whom he or she provides medical services by telemedicine is:  (1) informed of the relationship between the physician and patient and the respective role of any other health care provider with respect to management of the patient; and (2) notified that he or she may decline to receive medical services by telemedicine and may withdraw from such care at any time.    Notes:

## 2022-02-02 NOTE — PATIENT INSTRUCTIONS
1. Continue Lialda 4.8 g daily and Canasa suppositories once daily  2. Submit fecal calprotectin now  3. Get pneumonia vaccine and shingles vaccines  4. Follow-up in 3 months

## 2022-02-04 ENCOUNTER — CLINICAL SUPPORT (OUTPATIENT)
Dept: INFECTIOUS DISEASES | Facility: CLINIC | Age: 52
End: 2022-02-04
Payer: COMMERCIAL

## 2022-02-04 DIAGNOSIS — K51.30 ULCERATIVE RECTOSIGMOIDITIS WITHOUT COMPLICATION: ICD-10-CM

## 2022-02-04 PROCEDURE — 90732 PNEUMOCOCCAL POLYSACCHARIDE VACCINE 23-VALENT =>2YO SQ IM: ICD-10-PCS | Mod: S$GLB,,, | Performed by: INTERNAL MEDICINE

## 2022-02-04 PROCEDURE — 99999 PR PBB SHADOW E&M-EST. PATIENT-LVL II: ICD-10-PCS | Mod: PBBFAC,,,

## 2022-02-04 PROCEDURE — 90732 PPSV23 VACC 2 YRS+ SUBQ/IM: CPT | Mod: S$GLB,,, | Performed by: INTERNAL MEDICINE

## 2022-02-04 PROCEDURE — 90472 ZOSTER RECOMBINANT VACCINE: ICD-10-PCS | Mod: S$GLB,,, | Performed by: INTERNAL MEDICINE

## 2022-02-04 PROCEDURE — 90472 IMMUNIZATION ADMIN EACH ADD: CPT | Mod: S$GLB,,, | Performed by: INTERNAL MEDICINE

## 2022-02-04 PROCEDURE — 90750 HZV VACC RECOMBINANT IM: CPT | Mod: S$GLB,,, | Performed by: INTERNAL MEDICINE

## 2022-02-04 PROCEDURE — 90471 PNEUMOCOCCAL POLYSACCHARIDE VACCINE 23-VALENT =>2YO SQ IM: ICD-10-PCS | Mod: S$GLB,,, | Performed by: INTERNAL MEDICINE

## 2022-02-04 PROCEDURE — 99999 PR PBB SHADOW E&M-EST. PATIENT-LVL II: CPT | Mod: PBBFAC,,,

## 2022-02-04 PROCEDURE — 90471 IMMUNIZATION ADMIN: CPT | Mod: S$GLB,,, | Performed by: INTERNAL MEDICINE

## 2022-02-04 PROCEDURE — 90750 ZOSTER RECOMBINANT VACCINE: ICD-10-PCS | Mod: S$GLB,,, | Performed by: INTERNAL MEDICINE

## 2022-02-04 NOTE — PROGRESS NOTES
Patient received Shingrix vaccine IM to the left deltoid. And also Pneumovax to the right deltoid.  Tolerated well and left in NAD

## 2022-02-09 ENCOUNTER — PATIENT MESSAGE (OUTPATIENT)
Dept: GASTROENTEROLOGY | Facility: CLINIC | Age: 52
End: 2022-02-09
Payer: COMMERCIAL

## 2022-02-23 ENCOUNTER — PATIENT MESSAGE (OUTPATIENT)
Dept: GASTROENTEROLOGY | Facility: CLINIC | Age: 52
End: 2022-02-23
Payer: COMMERCIAL

## 2022-04-06 ENCOUNTER — PATIENT MESSAGE (OUTPATIENT)
Dept: GASTROENTEROLOGY | Facility: CLINIC | Age: 52
End: 2022-04-06
Payer: COMMERCIAL

## 2022-04-07 ENCOUNTER — PATIENT MESSAGE (OUTPATIENT)
Dept: GASTROENTEROLOGY | Facility: CLINIC | Age: 52
End: 2022-04-07
Payer: COMMERCIAL

## 2022-04-08 ENCOUNTER — PATIENT MESSAGE (OUTPATIENT)
Dept: GASTROENTEROLOGY | Facility: CLINIC | Age: 52
End: 2022-04-08
Payer: COMMERCIAL

## 2022-04-11 ENCOUNTER — CLINICAL SUPPORT (OUTPATIENT)
Dept: INFECTIOUS DISEASES | Facility: CLINIC | Age: 52
End: 2022-04-11
Payer: COMMERCIAL

## 2022-04-11 DIAGNOSIS — K51.30 ULCERATIVE RECTOSIGMOIDITIS WITHOUT COMPLICATION: ICD-10-CM

## 2022-04-11 PROCEDURE — 90750 HZV VACC RECOMBINANT IM: CPT | Mod: S$GLB,,, | Performed by: INTERNAL MEDICINE

## 2022-04-11 PROCEDURE — 90471 ZOSTER RECOMBINANT VACCINE: ICD-10-PCS | Mod: S$GLB,,, | Performed by: INTERNAL MEDICINE

## 2022-04-11 PROCEDURE — 99999 PR PBB SHADOW E&M-EST. PATIENT-LVL I: ICD-10-PCS | Mod: PBBFAC,,,

## 2022-04-11 PROCEDURE — 99999 PR PBB SHADOW E&M-EST. PATIENT-LVL I: CPT | Mod: PBBFAC,,,

## 2022-04-11 PROCEDURE — 90750 ZOSTER RECOMBINANT VACCINE: ICD-10-PCS | Mod: S$GLB,,, | Performed by: INTERNAL MEDICINE

## 2022-04-11 PROCEDURE — 90471 IMMUNIZATION ADMIN: CPT | Mod: S$GLB,,, | Performed by: INTERNAL MEDICINE

## 2022-04-11 NOTE — PROGRESS NOTES
Patient received zoster #2 vaccine in the right deltoid. Pt tolerated well. Pt asked to wait in the clinic 15 minutes after injection in the event of an allergic reaction. Pt verbalized understanding. Pt left in NAD.

## 2022-05-09 ENCOUNTER — LAB VISIT (OUTPATIENT)
Dept: LAB | Facility: HOSPITAL | Age: 52
End: 2022-05-09
Attending: INTERNAL MEDICINE
Payer: COMMERCIAL

## 2022-05-09 DIAGNOSIS — K51.30 ULCERATIVE RECTOSIGMOIDITIS WITHOUT COMPLICATION: ICD-10-CM

## 2022-05-09 DIAGNOSIS — K51.311 ULCERATIVE RECTOSIGMOIDITIS WITH RECTAL BLEEDING: ICD-10-CM

## 2022-05-09 PROCEDURE — 83993 ASSAY FOR CALPROTECTIN FECAL: CPT | Performed by: INTERNAL MEDICINE

## 2022-05-10 ENCOUNTER — CLINICAL SUPPORT (OUTPATIENT)
Dept: OTHER | Facility: CLINIC | Age: 52
End: 2022-05-10
Payer: COMMERCIAL

## 2022-05-10 DIAGNOSIS — Z00.8 ENCOUNTER FOR OTHER GENERAL EXAMINATION: ICD-10-CM

## 2022-05-12 VITALS
BODY MASS INDEX: 19.33 KG/M2 | WEIGHT: 116 LBS | HEIGHT: 65 IN | SYSTOLIC BLOOD PRESSURE: 130 MMHG | DIASTOLIC BLOOD PRESSURE: 81 MMHG

## 2022-05-12 LAB
GLUCOSE SERPL-MCNC: 75 MG/DL (ref 60–140)
HDLC SERPL-MCNC: 67 MG/DL
POC CHOLESTEROL, LDL (DOCK): 155.06 MG/DL
POC CHOLESTEROL, TOTAL: 232 MG/DL
TRIGL SERPL-MCNC: 59 MG/DL

## 2022-05-13 ENCOUNTER — PATIENT MESSAGE (OUTPATIENT)
Dept: OBSTETRICS AND GYNECOLOGY | Facility: CLINIC | Age: 52
End: 2022-05-13
Payer: COMMERCIAL

## 2022-05-13 DIAGNOSIS — Z12.31 BREAST CANCER SCREENING BY MAMMOGRAM: Primary | ICD-10-CM

## 2022-05-16 LAB
CALPROTECTIN STL-MCNT: 746.9 MCG/G
CALPROTECTIN STL-MCNT: 746.9 MCG/G

## 2022-05-17 ENCOUNTER — TELEPHONE (OUTPATIENT)
Dept: GASTROENTEROLOGY | Facility: CLINIC | Age: 52
End: 2022-05-17
Payer: COMMERCIAL

## 2022-05-23 ENCOUNTER — TELEPHONE (OUTPATIENT)
Dept: GASTROENTEROLOGY | Facility: CLINIC | Age: 52
End: 2022-05-23
Payer: COMMERCIAL

## 2022-05-23 ENCOUNTER — PATIENT MESSAGE (OUTPATIENT)
Dept: OBSTETRICS AND GYNECOLOGY | Facility: CLINIC | Age: 52
End: 2022-05-23
Payer: COMMERCIAL

## 2022-07-14 ENCOUNTER — TELEPHONE (OUTPATIENT)
Dept: GASTROENTEROLOGY | Facility: CLINIC | Age: 52
End: 2022-07-14
Payer: COMMERCIAL

## 2022-07-26 ENCOUNTER — TELEPHONE (OUTPATIENT)
Dept: GASTROENTEROLOGY | Facility: CLINIC | Age: 52
End: 2022-07-26
Payer: COMMERCIAL

## 2022-07-26 NOTE — TELEPHONE ENCOUNTER
Spoke with patient  Scheduled appt for 7/29/22 at 1:30 pm with a 1:15 pm arrival  Advised patient on the importance of keeping scheduled appointments.  Pt verbalized understanding to all and has no further questions at this time.

## 2022-07-26 NOTE — TELEPHONE ENCOUNTER
----- Message from Ld Mark sent at 7/25/2022  9:35 AM CDT -----  Regarding: reschedule appt  Contact: @[phone]  Pt requesting a call back to reschedule her appt that was cancelled on 07/19/22. I attempted to reschedule but had no access.  Please call to discuss further.

## 2022-07-28 RX ORDER — LATANOPROST 50 UG/ML
1 SOLUTION/ DROPS OPHTHALMIC NIGHTLY
COMMUNITY
Start: 2022-07-11

## 2022-07-28 RX ORDER — TRETINOIN 0.5 MG/G
CREAM TOPICAL NIGHTLY
COMMUNITY
Start: 2022-07-14

## 2022-07-28 NOTE — PROGRESS NOTES
"IBD PATIENT INTAKE:    COVID symptoms in the last 7 days (runny nose, sore throat, congestion, cough, fever): No  PCP: Per Saab (Inactive)  If not PCP-  number given to establish [phone]: N/A    ALLERGIES REVIEWED:  Yes    CHIEF COMPLAINT:  No chief complaint on file.      VITAL SIGNS:  BP (!) 142/76 (BP Location: Left arm, Patient Position: Sitting, BP Method: Medium (Automatic))   Pulse (!) 59   Ht 5' 5" (1.651 m)   Wt 53.8 kg (118 lb 9.7 oz)   LMP 04/13/2018   BMI 19.74 kg/m²      Change in medical, surgical, family or social history: No    IBD THERAPY (name, dose/frequency):  Lialda 4.8 g daily and canasa supp every night   Last dose:      Next dose:    Infusion/Pharmacy: NA    NSAIDs (aspirin, ibuprofen-advil or motrin, naproxen-aleve, diclofenac-voltaren, BC powder, excedrin, goodies): No    Alternative/Complementary Medications (i.e. probiotics, turmeric, fish oil, aloe vera):      no  Name/dose:  none    Vitamins:   Vit D:  Yes, not sure dosage      Vit B-12:  no   Folic Acid: no       Calcium: no     Iron:  no      MVI: no    Antibiotics (past 30 Days):  no  If yes   Indication:  Name of antibiotic:  Completion date:     REVIEWED MEDICATION LIST RECONCILED INCLUDING ABOVE MEDS:  yes                      "

## 2022-07-29 ENCOUNTER — LAB VISIT (OUTPATIENT)
Dept: LAB | Facility: HOSPITAL | Age: 52
End: 2022-07-29
Attending: INTERNAL MEDICINE
Payer: COMMERCIAL

## 2022-07-29 ENCOUNTER — OFFICE VISIT (OUTPATIENT)
Dept: GASTROENTEROLOGY | Facility: CLINIC | Age: 52
End: 2022-07-29
Payer: COMMERCIAL

## 2022-07-29 VITALS
BODY MASS INDEX: 19.76 KG/M2 | HEART RATE: 59 BPM | DIASTOLIC BLOOD PRESSURE: 76 MMHG | SYSTOLIC BLOOD PRESSURE: 142 MMHG | WEIGHT: 118.63 LBS | HEIGHT: 65 IN

## 2022-07-29 DIAGNOSIS — K51.311 ULCERATIVE RECTOSIGMOIDITIS WITH RECTAL BLEEDING: ICD-10-CM

## 2022-07-29 DIAGNOSIS — L65.9 HAIR LOSS: ICD-10-CM

## 2022-07-29 DIAGNOSIS — K51.311 ULCERATIVE RECTOSIGMOIDITIS WITH RECTAL BLEEDING: Primary | ICD-10-CM

## 2022-07-29 DIAGNOSIS — R23.8 SCALP IRRITATION: ICD-10-CM

## 2022-07-29 LAB
25(OH)D3+25(OH)D2 SERPL-MCNC: 69 NG/ML (ref 30–96)
ALBUMIN SERPL BCP-MCNC: 4.5 G/DL (ref 3.5–5.2)
ALP SERPL-CCNC: 93 U/L (ref 55–135)
ALT SERPL W/O P-5'-P-CCNC: 13 U/L (ref 10–44)
ANION GAP SERPL CALC-SCNC: 8 MMOL/L (ref 8–16)
AST SERPL-CCNC: 16 U/L (ref 10–40)
BASOPHILS # BLD AUTO: 0.07 K/UL (ref 0–0.2)
BASOPHILS NFR BLD: 1.1 % (ref 0–1.9)
BILIRUB SERPL-MCNC: 0.5 MG/DL (ref 0.1–1)
BUN SERPL-MCNC: 10 MG/DL (ref 6–20)
CALCIUM SERPL-MCNC: 9.7 MG/DL (ref 8.7–10.5)
CHLORIDE SERPL-SCNC: 105 MMOL/L (ref 95–110)
CO2 SERPL-SCNC: 28 MMOL/L (ref 23–29)
CREAT SERPL-MCNC: 0.7 MG/DL (ref 0.5–1.4)
CRP SERPL-MCNC: 0.5 MG/L (ref 0–8.2)
DIFFERENTIAL METHOD: ABNORMAL
EOSINOPHIL # BLD AUTO: 0.2 K/UL (ref 0–0.5)
EOSINOPHIL NFR BLD: 2.6 % (ref 0–8)
ERYTHROCYTE [DISTWIDTH] IN BLOOD BY AUTOMATED COUNT: 12.5 % (ref 11.5–14.5)
EST. GFR  (AFRICAN AMERICAN): >60 ML/MIN/1.73 M^2
EST. GFR  (NON AFRICAN AMERICAN): >60 ML/MIN/1.73 M^2
FOLATE SERPL-MCNC: 15.7 NG/ML (ref 4–24)
GLUCOSE SERPL-MCNC: 87 MG/DL (ref 70–110)
HCT VFR BLD AUTO: 42.1 % (ref 37–48.5)
HGB BLD-MCNC: 13.6 G/DL (ref 12–16)
IGA SERPL-MCNC: 246 MG/DL (ref 40–350)
IMM GRANULOCYTES # BLD AUTO: 0.01 K/UL (ref 0–0.04)
IMM GRANULOCYTES NFR BLD AUTO: 0.2 % (ref 0–0.5)
LYMPHOCYTES # BLD AUTO: 2.7 K/UL (ref 1–4.8)
LYMPHOCYTES NFR BLD: 44.1 % (ref 18–48)
MCH RBC QN AUTO: 31.4 PG (ref 27–31)
MCHC RBC AUTO-ENTMCNC: 32.3 G/DL (ref 32–36)
MCV RBC AUTO: 97 FL (ref 82–98)
MONOCYTES # BLD AUTO: 0.6 K/UL (ref 0.3–1)
MONOCYTES NFR BLD: 9.1 % (ref 4–15)
NEUTROPHILS # BLD AUTO: 2.6 K/UL (ref 1.8–7.7)
NEUTROPHILS NFR BLD: 42.9 % (ref 38–73)
NRBC BLD-RTO: 0 /100 WBC
PLATELET # BLD AUTO: 364 K/UL (ref 150–450)
PMV BLD AUTO: 9.2 FL (ref 9.2–12.9)
POTASSIUM SERPL-SCNC: 4.1 MMOL/L (ref 3.5–5.1)
PROT SERPL-MCNC: 7.9 G/DL (ref 6–8.4)
RBC # BLD AUTO: 4.33 M/UL (ref 4–5.4)
SODIUM SERPL-SCNC: 141 MMOL/L (ref 136–145)
TSH SERPL DL<=0.005 MIU/L-ACNC: 3.06 UIU/ML (ref 0.4–4)
VIT B12 SERPL-MCNC: >2000 PG/ML (ref 210–950)
WBC # BLD AUTO: 6.15 K/UL (ref 3.9–12.7)

## 2022-07-29 PROCEDURE — 36415 COLL VENOUS BLD VENIPUNCTURE: CPT | Performed by: INTERNAL MEDICINE

## 2022-07-29 PROCEDURE — 3077F SYST BP >= 140 MM HG: CPT | Mod: CPTII,S$GLB,, | Performed by: INTERNAL MEDICINE

## 2022-07-29 PROCEDURE — 1159F MED LIST DOCD IN RCRD: CPT | Mod: CPTII,S$GLB,, | Performed by: INTERNAL MEDICINE

## 2022-07-29 PROCEDURE — 3008F PR BODY MASS INDEX (BMI) DOCUMENTED: ICD-10-PCS | Mod: CPTII,S$GLB,, | Performed by: INTERNAL MEDICINE

## 2022-07-29 PROCEDURE — 99214 OFFICE O/P EST MOD 30 MIN: CPT | Mod: S$GLB,,, | Performed by: INTERNAL MEDICINE

## 2022-07-29 PROCEDURE — 82306 VITAMIN D 25 HYDROXY: CPT | Performed by: INTERNAL MEDICINE

## 2022-07-29 PROCEDURE — 82784 ASSAY IGA/IGD/IGG/IGM EACH: CPT | Performed by: INTERNAL MEDICINE

## 2022-07-29 PROCEDURE — 3077F PR MOST RECENT SYSTOLIC BLOOD PRESSURE >= 140 MM HG: ICD-10-PCS | Mod: CPTII,S$GLB,, | Performed by: INTERNAL MEDICINE

## 2022-07-29 PROCEDURE — 82607 VITAMIN B-12: CPT | Performed by: INTERNAL MEDICINE

## 2022-07-29 PROCEDURE — 3008F BODY MASS INDEX DOCD: CPT | Mod: CPTII,S$GLB,, | Performed by: INTERNAL MEDICINE

## 2022-07-29 PROCEDURE — 1160F RVW MEDS BY RX/DR IN RCRD: CPT | Mod: CPTII,S$GLB,, | Performed by: INTERNAL MEDICINE

## 2022-07-29 PROCEDURE — 82746 ASSAY OF FOLIC ACID SERUM: CPT | Performed by: INTERNAL MEDICINE

## 2022-07-29 PROCEDURE — 99214 PR OFFICE/OUTPT VISIT, EST, LEVL IV, 30-39 MIN: ICD-10-PCS | Mod: S$GLB,,, | Performed by: INTERNAL MEDICINE

## 2022-07-29 PROCEDURE — 3078F PR MOST RECENT DIASTOLIC BLOOD PRESSURE < 80 MM HG: ICD-10-PCS | Mod: CPTII,S$GLB,, | Performed by: INTERNAL MEDICINE

## 2022-07-29 PROCEDURE — 3078F DIAST BP <80 MM HG: CPT | Mod: CPTII,S$GLB,, | Performed by: INTERNAL MEDICINE

## 2022-07-29 PROCEDURE — 84443 ASSAY THYROID STIM HORMONE: CPT | Performed by: INTERNAL MEDICINE

## 2022-07-29 PROCEDURE — 83516 IMMUNOASSAY NONANTIBODY: CPT | Performed by: INTERNAL MEDICINE

## 2022-07-29 PROCEDURE — 86140 C-REACTIVE PROTEIN: CPT | Performed by: INTERNAL MEDICINE

## 2022-07-29 PROCEDURE — 80053 COMPREHEN METABOLIC PANEL: CPT | Performed by: INTERNAL MEDICINE

## 2022-07-29 PROCEDURE — 1160F PR REVIEW ALL MEDS BY PRESCRIBER/CLIN PHARMACIST DOCUMENTED: ICD-10-PCS | Mod: CPTII,S$GLB,, | Performed by: INTERNAL MEDICINE

## 2022-07-29 PROCEDURE — 85025 COMPLETE CBC W/AUTO DIFF WBC: CPT | Performed by: INTERNAL MEDICINE

## 2022-07-29 PROCEDURE — 1159F PR MEDICATION LIST DOCUMENTED IN MEDICAL RECORD: ICD-10-PCS | Mod: CPTII,S$GLB,, | Performed by: INTERNAL MEDICINE

## 2022-07-29 NOTE — PATIENT INSTRUCTIONS
Continue Lialda and Canasa  Adjust vitamins as discussed  Get labs today  Submit stool test middle of next months  Appt with dermatology  Follow up in 6 months

## 2022-07-29 NOTE — PROGRESS NOTES
[facility]          Inflammatory Bowel Disease Follow Up Consultation Note         TODAY'S VISIT DATE:  7/29/2022    Reason for Consult:    Chief Complaint   Patient presents with    Ulcerative Colitis       PCP: Per Saab (Inactive)      Referring MD:   No ref. provider found    History of Present Illness:  Raiza Cherry who is a 52 y.o. female is being seen today at the [facility] on 07/29/2022 for inflammatory bowel disease- ulcerative colitis.  She is here today for follow-up of her colitis.  Overall her symptoms have been fairly well controlled.  She does note that increased stress causes more abdominal cramping and diarrhea.  For the most part she has 2-3 formed bowel movements daily.  She has been consistently taking Lialda 4.8 g daily and Canasa suppositories at night.  Her main complaint now is that she has been losing her hair for the last several months.  This occurred after she had the flu in May.  She saw all 1 dermatologist who recommended a skin biopsy of the scalp.  She saw another dermatologist who did not recommend biopsy but did not have any clear explanation for her symptoms other than it being associated with her recent illness.  She has been taking some vitamin and probiotic supplements that were recommended by her gynecologist for hair loss but these have not helped.    IBD History:  She has a history of left-sided ulcerative colitis.  This was diagnosed around 1990.  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  She has been managed with multiple 5 ASA products (both oral and rectal) as well as prednisone (tolerated very poorly) and Entocort. Her last colonoscopy was in October 2018 at which time there was some mild proctitis and some atrophy in the left colon.  Earlier  this year she saw Dr. Gil because of active symptoms.  She started her on Lialda 4.8 g daily and Canasa suppositories twice daily initially.  After she was doing better these were reduced down to once daily.  Eventually she stop the Canasa suppositories and was only taking Lialda 4.8 g daily.  She noted a significant increase in her symptoms after being off of the suppositories.  In January 2022 she underwent colonoscopy that showed active inflammation of the left colon.  At that time she reported that she was not taking Lialda for the last few months and was only taking Canasa suppositories.  She started taking Lialda 4.8 g daily and Canasa suppositories daily on a consistent basis in early 2022. A stool calprotectin level in early May 2022 was markedly elevated.  She did not submit a sample prior to starting the Lialda so I do not have a baseline to compare to.    IBD Details:  Dx Date:  1990  Disease type/distribution:  Ulcerative colitis/left colon disease extending to the descending colon  Current Treatment:  Lialda 4.8 g daily/Canasa suppositories daily  Start Date:  July 2020  Response:  Incomplete  Optimized:  Yes  Adverse reactions:  None  Prior surgeries:  None  CRP Elevation:  No  Disease Complications:  None  Extraintestinal manifestations:  None  Prior treatments:   Steroids:  Good response  5ASA:  Incomplete response  IMM:  None  TNF Inh:  None   Anti-Integrin:  None   [place] 12/23:  None  ELIZABETH Inh:  None    Previous Clinical Trials:  None    Last Colonoscopy:   January 2022-inflammation in the rectum, sigmoid, descending colon, otherwise normal     October 2018-rectal inflammation, atrophy of the left colon, otherwise normal    Other Endoscopies:  None    Imaging:   MRE:  None   CT:  None   Other:  None    Pertinent Labs:  Lab Results   Component Value Date    SEDRATE 6 06/12/2019    CRP 0.6 10/13/2021     Lab Results   Component Value Date    TTGIGA 7 10/13/2021     10/13/2021     Lab Results    Component Value Date    TSH 3.022 10/13/2021    FREET4 0.84 10/13/2021     Lab Results   Component Value Date    IOIRBOZX53AK 59 10/13/2021    FIQAXYBB81 >2000 (H) 10/13/2021     Lab Results   Component Value Date    HEPBSAG Negative 10/13/2021    HEPBCAB Negative 10/13/2021    HEPCAB Negative 10/13/2021     Lab Results   Component Value Date    MQD85VHRW Negative 10/13/2021     Lab Results   Component Value Date    NIL 0.030 10/13/2021    MITOGENNIL >10.000 10/13/2021     Lab Results   Component Value Date    TPTMINTERP SEE BELOW 10/13/2021     Lab Results   Component Value Date    CDIFFICILEAN Negative 07/21/2020    CDIFFTOX Negative 07/21/2020     Lab Results   Component Value Date    CALPROTECTIN 746.9 (H) 05/09/2022    CALPROTECTIN 746.9 (H) 05/09/2022       Therapeutic Drug Monitoring Labs:  No results found for: PROMETH  No results found for: ANSADAINIT, INFLIXIMAB, INFLIXINTERP    Vaccinations:  Lab Results   Component Value Date    HEPBSAB Negative 10/13/2021     Lab Results   Component Value Date    HEPAIGG Negative 10/13/2021     Lab Results   Component Value Date    VARICELLAZOS 4.03 (H) 10/13/2021    VARICELLAINT Positive (A) 10/13/2021     Immunization History   Administered Date(s) Administered    COVID-19, MRNA, LN-S, PF (Pfizer) (Purple Cap) 03/06/2021, 03/27/2021, 12/04/2021    Influenza 09/30/2019, 10/01/2020, 11/01/2021    Pneumococcal Conjugate - 13 Valent 10/15/2020    Pneumococcal Polysaccharide - 23 Valent 02/04/2022    Tdap 09/23/2021    Zoster Recombinant 02/04/2022, 04/11/2022         Review of Systems  Review of Systems   Constitutional: Negative for chills, fever and weight loss.   HENT: Negative for sore throat.    Eyes: Negative for pain, discharge and redness.   Respiratory: Negative for cough, shortness of breath and wheezing.    Cardiovascular: Negative for chest pain, orthopnea and leg swelling.   Gastrointestinal: Negative for abdominal pain, blood in stool, constipation,  diarrhea, heartburn, melena, nausea and vomiting.   Genitourinary: Negative for dysuria, frequency and urgency.   Musculoskeletal: Negative for back pain, joint pain and myalgias.   Skin: Positive for rash. Negative for itching.        Hair loss   Neurological: Negative for focal weakness and seizures.   Endo/Heme/Allergies: Does not bruise/bleed easily.   Psychiatric/Behavioral: Negative for depression. The patient is not nervous/anxious.             Medical History:   Past Medical History:   Diagnosis Date    Abnormal Pap smear of cervix     LEEP 2016, 9-2017 LGSIL, colpo  mildly abnl cells,     Cataract     Chronic diarrhea     Glaucoma     Insomnia     Ulcerative colitis     proctitis       Surgical History:  Past Surgical History:   Procedure Laterality Date    AUGMENTATION OF BREAST      implants were deflated by a doctor and will be removed    BREAST SURGERY  2014, 2018    enlargement and lift of both breasts    CATARACT EXTRACTION W/  INTRAOCULAR LENS IMPLANT Right 10/06/2021    DR.ELLEN BULLOCK    CATARACT EXTRACTION W/  INTRAOCULAR LENS IMPLANT Left 2013    DR.ELLEN BULLOCK    CERVICAL BIOPSY  W/ LOOP ELECTRODE EXCISION  02/2016    Moderate dysplasia completely excised margins clear and ECC negative    COLONOSCOPY N/A 1/19/2022    Procedure: COLONOSCOPY;  Surgeon: Bran Breaux MD;  Location: [facility] ([address] [place] [address]);  Service: Endoscopy;  Laterality: N/A;  fully vaccinated 3/27/21, instructions sent to [email or web address]      COVID test at [place] on 1/16-GT       Family History:   Family History   Problem Relation Age of Onset    Lung disease Father     Glaucoma Father     Diabetes Mother     Heart disease Mother     Lupus Sister     No Known Problems Sister     No Known Problems Sister     No Known Problems Sister     Breast cancer Neg Hx     Colon cancer Neg Hx     Ovarian cancer Neg Hx     Cancer Neg Hx     Amblyopia Neg Hx     Blindness Neg Hx     Macular  "degeneration Neg Hx     Retinal detachment Neg Hx     Strabismus Neg Hx        Social History:   Social History     Tobacco Use    Smoking status: Never Smoker    Smokeless tobacco: Never Used   Substance Use Topics    Alcohol use: Yes     Comment: Rarely    Drug use: No       Allergies: Reviewed    Home Medications:   Medication List with Changes/Refills   Current Medications    ASCORBIC ACID, VITAMIN C, (VITAMIN C) 100 MG TABLET    Take 100 mg by mouth once daily.    BRIMONIDINE-TIMOLOL (COMBIGAN) 0.2-0.5 % DROP    Place 1 drop into both eyes 2 (two) times daily.    LATANOPROST 0.005 % OPHTHALMIC SOLUTION    Place 1 drop into both eyes nightly.    MESALAMINE (CANASA) 1000 MG SUPP    PLACE 1 SUPPOSITORY (1,000 MG TOTAL) RECTALLY 2 (TWO) TIMES DAILY. (INSURANCE COVERS ONLY 1 DAILY)    MESALAMINE (LIALDA) 1.2 GRAM TBEC    Take 4 tablets (4.8 g total) by mouth daily with breakfast.    TRETINOIN (RETIN-A) 0.05 % CREAM    Apply topically nightly.   Discontinued Medications    METHAZOLAMIDE (NEPTAZANE) 50 MG TAB    Take 1 tablet (50 mg total) by mouth 2 (two) times daily.    MULTIVIT,CALC,MINS/IRON/FOLIC (ONE-A-DAY WOMENS FORMULA ORAL)    Take 1 tablet by mouth once daily.    ROCKLATAN 0.02-0.005 % DROP    Place into both eyes.    VITAMIN C-BIOTIN 50 MG -1,250 MCG CHEW    Take 1 tablet by mouth once daily.       Physical Exam:  Vital Signs:  BP (!) 142/76 (BP Location: Left arm, Patient Position: Sitting, BP Method: Medium (Automatic))   Pulse (!) 59   Ht 5' 5" (1.651 m)   Wt 53.8 kg (118 lb 9.7 oz)   LMP 04/13/2018   BMI 19.74 kg/m²   Body mass index is 19.74 kg/m².    Physical Exam  Constitutional:       General: She is not in acute distress.     Appearance: She is well-developed. She is not ill-appearing or toxic-appearing.   Eyes:      General: No scleral icterus.  Cardiovascular:      Rate and Rhythm: Normal rate and regular rhythm.      Heart sounds: Normal heart sounds. No murmur heard.  Pulmonary:      " Effort: Pulmonary effort is normal.      Breath sounds: Normal breath sounds. No wheezing or rales.   Abdominal:      General: Bowel sounds are normal. There is no distension.      Palpations: Abdomen is soft.      Tenderness: There is no abdominal tenderness.   Skin:     General: Skin is warm and dry.      Coloration: Skin is not pale.      Findings: Rash present. No erythema.      Comments: Erythematous scaling rash on the scalp   Neurological:      General: No focal deficit present.      Mental Status: She is alert and oriented to person, place, and time.   Psychiatric:         Mood and Affect: Mood normal.         Behavior: Behavior normal.         Thought Content: Thought content normal.         Judgment: Judgment normal.         Labs: reviewed and pertinent noted above    Assessment/Plan:  Raiza Cherry is a 52 y.o. female with left-sided ulcerative colitis. The following issues were addresssed:    1. Ulcerative rectosigmoiditis with rectal bleeding    2. Hair loss    3. Scalp irritation      1.  Ulcerative colitis:  Overall her symptoms seem to be pretty well controlled but she is rarely symptomatic even when she has active inflammation.  I recommend that she continue taking her current medical therapy and that we recheck a calprotectin level next month.  If it remains significantly elevated and has not declined significantly we will likely need to consider a repeat flex sig to reassess her disease activity and potentially consider starting new therapy.    2. Hair loss:  It is possible that the hair loss is associated with her recent flu illness but that was over 2 months ago and I would expect her to have recovered by now.  We discussed the fact that it could also be related to her active ulcerative colitis.  We will plan to check labs today including thyroid function and vitamin-D level.  She does have some erythematous scaling skin in the scalp area suspicious for seborrheic dermatitis.  I have  placed another referral to a dermatologist for another opinion.    We also discussed today that she is taking multiple vitamin products.  Most of these are redundant as several of the formulation she is taking have the same ingredients.  I advised her to stop most of these and only take a multivitamin and her vitamin-D supplement.      # IBD specific health maintenance:  Colon cancer surveillance:  Up-to-date    Annual:  - Eye exam:  Not applicable  - Skin exam (if on IMM/TNF):  Up-to-date  - reminded pt to use sunblock/hats/sunprotective clothing  - PAP (if immunosuppressed):  June 2021    DEXA:  Not applicable    Vitamin D:  Check today    Vaccines:    Influenza:  Up-to-date   Pneumovax:     PCV13:  Received    PSV23:  Up-to-date   HAV:  Discuss in the future   HBV:  Discuss in the   Tdap:  Needs to be updated   MMR:  Immune   VZV:  Immune   HZV:  Up-to-date   HPV:  Not applicable   Meningococcus:  Not applicable   COVID:  Completed series and booster      Follow up: Follow up in about 6 months (around 1/29/2023).    Thank you again for sending Raiza Cherry to see Dr. Shakir Breaux today at the [facility]. Please don't hesitate to contact Dr. Breaux if there are any questions regarding this evaluation, or if you have any other patients with inflammatory bowel disease for whom you would like a consultation. You can reach Dr. Breaux at [phone] or by email at [email or web address]    Bran Breaux MD  Department of Gastroenterology  Inflammatory Bowel Disease

## 2022-08-02 ENCOUNTER — APPOINTMENT (OUTPATIENT)
Dept: RADIOLOGY | Facility: OTHER | Age: 52
End: 2022-08-02
Attending: OBSTETRICS & GYNECOLOGY
Payer: COMMERCIAL

## 2022-08-02 VITALS — WEIGHT: 118.63 LBS | HEIGHT: 65 IN | BODY MASS INDEX: 19.76 KG/M2

## 2022-08-02 DIAGNOSIS — Z12.31 BREAST CANCER SCREENING BY MAMMOGRAM: ICD-10-CM

## 2022-08-02 LAB — TTG IGA SER-ACNC: 5 UNITS

## 2022-08-02 PROCEDURE — 77063 BREAST TOMOSYNTHESIS BI: CPT | Mod: TC,PN

## 2022-08-02 PROCEDURE — 77063 BREAST TOMOSYNTHESIS BI: CPT | Mod: 26,,, | Performed by: RADIOLOGY

## 2022-08-02 PROCEDURE — 77067 SCR MAMMO BI INCL CAD: CPT | Mod: 26,,, | Performed by: RADIOLOGY

## 2022-08-02 PROCEDURE — 77063 MAMMO DIGITAL SCREENING BILAT WITH TOMO: ICD-10-PCS | Mod: 26,,, | Performed by: RADIOLOGY

## 2022-08-02 PROCEDURE — 77067 MAMMO DIGITAL SCREENING BILAT WITH TOMO: ICD-10-PCS | Mod: 26,,, | Performed by: RADIOLOGY

## 2022-08-06 NOTE — PROGRESS NOTES
Just wanted to let you know that I got your mammogram results back and the radiologist reading is perfectly normal. Let me know if you have any questions or concerns  Hope you have a great day!  Dr Robles

## 2022-08-08 ENCOUNTER — PATIENT MESSAGE (OUTPATIENT)
Dept: GASTROENTEROLOGY | Facility: CLINIC | Age: 52
End: 2022-08-08
Payer: COMMERCIAL

## 2022-08-11 ENCOUNTER — LAB VISIT (OUTPATIENT)
Dept: LAB | Facility: HOSPITAL | Age: 52
End: 2022-08-11
Attending: INTERNAL MEDICINE
Payer: COMMERCIAL

## 2022-08-11 DIAGNOSIS — K51.311 ULCERATIVE RECTOSIGMOIDITIS WITH RECTAL BLEEDING: ICD-10-CM

## 2022-08-11 PROCEDURE — 83993 ASSAY FOR CALPROTECTIN FECAL: CPT | Performed by: INTERNAL MEDICINE

## 2022-08-17 LAB — CALPROTECTIN STL-MCNT: ABNORMAL MCG/G

## 2022-08-22 ENCOUNTER — TELEPHONE (OUTPATIENT)
Dept: GASTROENTEROLOGY | Facility: HOSPITAL | Age: 52
End: 2022-08-22
Payer: COMMERCIAL

## 2022-08-22 DIAGNOSIS — K51.311 ULCERATIVE RECTOSIGMOIDITIS WITH RECTAL BLEEDING: Primary | ICD-10-CM

## 2022-08-22 NOTE — TELEPHONE ENCOUNTER
Her stool calprotectin level is even more elevated than it was previously in spite of being on therapy consistently.  I would like to schedule her for a sigmoidoscopy in the near future to reassess her disease activity and obtain biopsies and then we will make plans to advance therapy in the near future.

## 2022-08-22 NOTE — TELEPHONE ENCOUNTER
----- Message from Franci Landeros RN sent at 8/18/2022  3:00 PM CDT -----  Her stool calprotectin is significantly elevated.  When do you want repeat flex sig done

## 2022-08-25 ENCOUNTER — TELEPHONE (OUTPATIENT)
Dept: ENDOSCOPY | Facility: HOSPITAL | Age: 52
End: 2022-08-25
Payer: COMMERCIAL

## 2022-08-25 NOTE — TELEPHONE ENCOUNTER
Called pt to schedule flex sig, no answer. Left voicemail for pt to call the Endoscopy Scheduling Dept. [phone]

## 2022-08-25 NOTE — TELEPHONE ENCOUNTER
Patient called about a procedure she needed to schedule.  Explained to patient that Dr. Breaux ordered for a flex sigmoidoscopy to be done in 2-3 weeks.  Pt states she has a upcoming surgery and that she will call endoscopy scheduling when she is ready to schedule. Phone number provided.  
The patient is a 56y Male complaining of detox.

## 2022-08-25 NOTE — TELEPHONE ENCOUNTER
----- Message from Franci Landeros RN sent at 8/23/2022  2:39 PM CDT -----  Good afternoon,    Dr. Breaux has placed a referral for this patient for a flex sig to be done sometime in the next 2 - 3 weeks.  You can use one of his reserved spots.    Thanks,  CIPRIANO Koroma

## 2022-09-22 ENCOUNTER — HOSPITAL ENCOUNTER (OUTPATIENT)
Dept: PREADMISSION TESTING | Facility: OTHER | Age: 52
Discharge: HOME | End: 2022-09-22
Attending: PLASTIC SURGERY

## 2022-09-22 ENCOUNTER — ANESTHESIA EVENT (OUTPATIENT)
Dept: SURGERY | Facility: OTHER | Age: 52
End: 2022-09-22

## 2022-09-22 VITALS
BODY MASS INDEX: 19.66 KG/M2 | HEART RATE: 61 BPM | OXYGEN SATURATION: 100 % | WEIGHT: 118 LBS | HEIGHT: 65 IN | SYSTOLIC BLOOD PRESSURE: 144 MMHG | DIASTOLIC BLOOD PRESSURE: 78 MMHG | RESPIRATION RATE: 16 BRPM | TEMPERATURE: 98 F

## 2022-09-22 DIAGNOSIS — Z41.1 ELECTIVE PROCEDURE FOR UNACCEPTABLE COSMETIC APPEARANCE: Primary | ICD-10-CM

## 2022-09-22 RX ORDER — ACETAMINOPHEN 500 MG
1000 TABLET ORAL
Status: CANCELLED | OUTPATIENT
Start: 2022-09-22 | End: 2022-09-22

## 2022-09-22 RX ORDER — SODIUM CHLORIDE, SODIUM LACTATE, POTASSIUM CHLORIDE, CALCIUM CHLORIDE 600; 310; 30; 20 MG/100ML; MG/100ML; MG/100ML; MG/100ML
INJECTION, SOLUTION INTRAVENOUS CONTINUOUS
Status: CANCELLED | OUTPATIENT
Start: 2022-09-22

## 2022-09-22 RX ORDER — LIDOCAINE HYDROCHLORIDE 10 MG/ML
0.5 INJECTION, SOLUTION EPIDURAL; INFILTRATION; INTRACAUDAL; PERINEURAL ONCE
Status: CANCELLED | OUTPATIENT
Start: 2022-09-22 | End: 2022-09-22

## 2022-09-22 NOTE — DISCHARGE INSTRUCTIONS
Information to Prepare you for your Surgery    PRE-ADMIT TESTING -  [phone]    [address] [facility]          Your surgery has been scheduled at [facility]. We are pleased to have the opportunity to serve you. For Further Information please call [phone].    On the day of surgery please report to the Information Desk on the 1st floor.    CONTACT YOUR PHYSICIAN'S OFFICE THE DAY PRIOR TO YOUR SURGERY TO OBTAIN YOUR ARRIVAL TIME.     The evening before surgery do not eat anything after 9 p.m. ( this includes hard candy, chewing gum and mints).  You may only have GATORADE, POWERADE AND WATER  from 9 p.m. until you leave your home.   DO NOT DRINK ANY LIQUIDS ON THE WAY TO THE HOSPITAL.      Why does your anesthesiologist allow you to drink Gatorade/Powerade before surgery?  Gatorade/Powerade helps to increase your comfort before surgery and to decrease your nausea after surgery. The carbohydrates in Gatorade/Powerade help reduce your body's stress response to surgery.  If you are a diabetic-drink only water prior to surgery.      Current Visitor policy(12/27/2021) - Patients may have 2 visitors pre and post procedure. Only 2 visitors will be allowed in the Surgical building with the patient.     SPECIAL MEDICATION INSTRUCTIONS: TAKE medications checked off by the Anesthesiologist on your Medication List.    Surgery Patients:  If you take ASPIRIN - Your PHYSICIAN/SURGEON will need to inform you IF/OR when you need to stop taking aspirin prior to your surgery.     Do Not take any medications containing IBUPROFEN.    Do Not Wear any make-up (especially eye make-up) to surgery. Please remove any false eyelashes or eyelash extensions. If you arrive the day of surgery with makeup/eyelashes on you will be required to remove prior to surgery. (There is a risk of corneal abrasions if eye makeup/eyelash extensions are not removed)      Leave all valuables at home.    Do Not wear any jewelry or watches, including any metal in body piercings. Jewelry must be removed prior to coming to the hospital.  There is a possibility that rings that are unable to be removed may be cut off if they are on the surgical extremity.    Please remove all hair extensions, wigs, clips and any other metal accessories/ ornaments from your hair.  These items may pose a flammable/fire risk in Surgery and must be removed.    Do not shave your surgical area at least 5 days prior to your surgery. The surgical prep will be performed at the hospital according to Infection Control regulations.    Contact Lens must be removed before surgery. Either do not wear the contact lens or bring a case and solution for storage.  Please bring a container for eyeglasses or dentures as required.  Bring any paperwork your physician has provided, such as consent forms,  history and physicals, doctor's orders, etc.   Bring comfortable clothes that are loose fitting to wear upon discharge. Take into consideration the type of surgery being performed.  Maintain your diet as advised per your physician the day prior to surgery.      Adequate rest the night before surgery is advised.   Park in the Parking lot behind the hospital or in the [facility] across the street from the parking lot. Parking is complimentary.  If you will be discharged the same day as your procedure, please arrange for a responsible adult to drive you home or to accompany you if traveling by taxi.   YOU WILL NOT BE PERMITTED TO DRIVE OR TO LEAVE THE HOSPITAL ALONE AFTER SURGERY.   If you are being discharged the same day, it is strongly recommended that you arrange for someone to remain with you for the first 24 hrs following your surgery.    The Surgeon will speak to your family/visitor after your surgery regarding the outcome of your surgery and post op care.  The Surgeon may speak to you after your surgery, but there is a possibility you may  not remember the details.  Please check with your family members regarding the conversation with the Surgeon.    We strongly recommend whoever is bringing you home be present for discharge instructions.  This will ensure a thorough understanding for your post op home care.    ALL CHILDREN MUST ALWAYS BE ACCOMPANIED BY AN ADULT.    Visitors-Refer to current Visitor policy handouts.    Thank you for your cooperation.  The Staff of [facility].            Bathing Instructions with Hibiclens    Shower the evening before and morning of your procedure with Hibiclens:  Wash your face with water and your regular face wash/soap  Apply Hibiclens directly on your skin or on a wet washcloth and wash gently. When showering: Move away from the shower stream when applying Hibiclens to avoid rinsing off too soon.  Rinse thoroughly with warm water  Do not dilute Hibiclens        Dry off as usual, do not use any deodorant, powder, body lotions, perfume, after shave or cologne.

## 2022-09-22 NOTE — ANESTHESIA PREPROCEDURE EVALUATION
09/22/2022  Raiza Cherry is a 52 y.o., female.      Pre-op Assessment    I have reviewed the Patient Summary Reports.     I have reviewed the Nursing Notes. I have reviewed the NPO Status.   I have reviewed the Medications.     Review of Systems  Anesthesia Hx:  Denies Family Hx of Anesthesia complications.   Denies Personal Hx of Anesthesia complications.   Social:  Non-Smoker    Hematology/Oncology:  Hematology Normal   Oncology Normal     EENT/Dental:  EENT/Dental Normal glaucoma   Cardiovascular:  Cardiovascular Normal     Pulmonary:  Pulmonary Normal    Renal/:  Renal/ Normal     Hepatic/GI:   Denies PUD. Ulcerative colitis, controlled on meds   Musculoskeletal:  Musculoskeletal Normal    Neurological:  Neurology Normal    Endocrine:  Endocrine Normal    Dermatological:  Skin Normal    Psych:  Psychiatric Normal           Physical Exam  General: Cooperative, Alert and Oriented    Airway:  Mallampati: II   Mouth Opening: Normal  TM Distance: Normal  Neck ROM: Normal ROM    Dental:  Intact  Upper front all crown      Anesthesia Plan  Type of Anesthesia, risks & benefits discussed:    Anesthesia Type: Gen ETT  Intra-op Monitoring Plan: Standard ASA Monitors  Post Op Pain Control Plan: multimodal analgesia and IV/PO Opioids PRN  Induction:  IV  Airway Plan: Video  Informed Consent: Informed consent signed with the Patient and all parties understand the risks and agree with anesthesia plan.  All questions answered.   ASA Score: 2  Anesthesia Plan Notes: Labs in epic 7/22 in [facility]    Ready For Surgery From Anesthesia Perspective.     .

## 2022-09-27 ENCOUNTER — ANESTHESIA (OUTPATIENT)
Dept: SURGERY | Facility: OTHER | Age: 52
End: 2022-09-27

## 2022-09-27 ENCOUNTER — HOSPITAL ENCOUNTER (OUTPATIENT)
Facility: OTHER | Age: 52
Discharge: HOME | End: 2022-09-27
Attending: PLASTIC SURGERY | Admitting: PLASTIC SURGERY

## 2022-09-27 DIAGNOSIS — Z41.1 ELECTIVE PROCEDURE FOR UNACCEPTABLE COSMETIC APPEARANCE: Primary | ICD-10-CM

## 2022-09-27 PROCEDURE — 63600175 PHARM REV CODE 636 W HCPCS: Performed by: ANESTHESIOLOGY

## 2022-09-27 PROCEDURE — 71000016 HC POSTOP RECOV ADDL HR: Performed by: PLASTIC SURGERY

## 2022-09-27 PROCEDURE — 37000009 HC ANESTHESIA EA ADD 15 MINS: Performed by: PLASTIC SURGERY

## 2022-09-27 PROCEDURE — 36000706: Performed by: PLASTIC SURGERY

## 2022-09-27 PROCEDURE — 88300 SURGICAL PATH GROSS: CPT | Performed by: PATHOLOGY

## 2022-09-27 PROCEDURE — 37000008 HC ANESTHESIA 1ST 15 MINUTES: Performed by: PLASTIC SURGERY

## 2022-09-27 PROCEDURE — 88304 PR  SURG PATH,LEVEL III: ICD-10-PCS | Mod: 26,,, | Performed by: PATHOLOGY

## 2022-09-27 PROCEDURE — 25000003 PHARM REV CODE 250: Performed by: NURSE ANESTHETIST, CERTIFIED REGISTERED

## 2022-09-27 PROCEDURE — 88300 SURGICAL PATH GROSS: CPT | Mod: 26,59,, | Performed by: PATHOLOGY

## 2022-09-27 PROCEDURE — 36000707: Performed by: PLASTIC SURGERY

## 2022-09-27 PROCEDURE — 63600175 PHARM REV CODE 636 W HCPCS: Performed by: NURSE ANESTHETIST, CERTIFIED REGISTERED

## 2022-09-27 PROCEDURE — 71000039 HC RECOVERY, EACH ADD'L HOUR: Performed by: PLASTIC SURGERY

## 2022-09-27 PROCEDURE — C1729 CATH, DRAINAGE: HCPCS | Performed by: PLASTIC SURGERY

## 2022-09-27 PROCEDURE — 88304 TISSUE EXAM BY PATHOLOGIST: CPT | Performed by: PATHOLOGY

## 2022-09-27 PROCEDURE — 88300 PR  SURG PATH,GROSS,LEVEL I: ICD-10-PCS | Mod: 26,59,, | Performed by: PATHOLOGY

## 2022-09-27 PROCEDURE — 71000015 HC POSTOP RECOV 1ST HR: Performed by: PLASTIC SURGERY

## 2022-09-27 PROCEDURE — 88304 TISSUE EXAM BY PATHOLOGIST: CPT | Mod: 26,,, | Performed by: PATHOLOGY

## 2022-09-27 PROCEDURE — 25000003 PHARM REV CODE 250: Performed by: ANESTHESIOLOGY

## 2022-09-27 PROCEDURE — 71000033 HC RECOVERY, INTIAL HOUR: Performed by: PLASTIC SURGERY

## 2022-09-27 PROCEDURE — 25000003 PHARM REV CODE 250: Performed by: PLASTIC SURGERY

## 2022-09-27 RX ORDER — ROCURONIUM BROMIDE 10 MG/ML
INJECTION, SOLUTION INTRAVENOUS
Status: DISCONTINUED | OUTPATIENT
Start: 2022-09-27 | End: 2022-09-27

## 2022-09-27 RX ORDER — ACETAMINOPHEN 500 MG
1000 TABLET ORAL
Status: COMPLETED | OUTPATIENT
Start: 2022-09-27 | End: 2022-09-27

## 2022-09-27 RX ORDER — CLINDAMYCIN PHOSPHATE 900 MG/50ML
INJECTION, SOLUTION INTRAVENOUS
Status: DISCONTINUED | OUTPATIENT
Start: 2022-09-27 | End: 2022-09-27

## 2022-09-27 RX ORDER — HYDROCODONE BITARTRATE AND ACETAMINOPHEN 5; 325 MG/1; MG/1
1 TABLET ORAL EVERY 4 HOURS PRN
Qty: 30 TABLET | Refills: 0 | Status: SHIPPED | OUTPATIENT
Start: 2022-09-27 | End: 2022-11-03

## 2022-09-27 RX ORDER — PROCHLORPERAZINE EDISYLATE 5 MG/ML
5 INJECTION INTRAMUSCULAR; INTRAVENOUS EVERY 30 MIN PRN
Status: DISCONTINUED | OUTPATIENT
Start: 2022-09-27 | End: 2022-09-27 | Stop reason: HOSPADM

## 2022-09-27 RX ORDER — SODIUM CHLORIDE 0.9 % (FLUSH) 0.9 %
3 SYRINGE (ML) INJECTION
Status: DISCONTINUED | OUTPATIENT
Start: 2022-09-27 | End: 2022-09-27 | Stop reason: HOSPADM

## 2022-09-27 RX ORDER — EPHEDRINE SULFATE 50 MG/ML
INJECTION, SOLUTION INTRAVENOUS
Status: DISCONTINUED | OUTPATIENT
Start: 2022-09-27 | End: 2022-09-27

## 2022-09-27 RX ORDER — SODIUM CHLORIDE, SODIUM LACTATE, POTASSIUM CHLORIDE, CALCIUM CHLORIDE 600; 310; 30; 20 MG/100ML; MG/100ML; MG/100ML; MG/100ML
INJECTION, SOLUTION INTRAVENOUS CONTINUOUS
Status: DISCONTINUED | OUTPATIENT
Start: 2022-09-27 | End: 2022-09-27 | Stop reason: HOSPADM

## 2022-09-27 RX ORDER — ONDANSETRON 2 MG/ML
INJECTION INTRAMUSCULAR; INTRAVENOUS
Status: DISCONTINUED | OUTPATIENT
Start: 2022-09-27 | End: 2022-09-27

## 2022-09-27 RX ORDER — LIDOCAINE HYDROCHLORIDE 20 MG/ML
INJECTION INTRAVENOUS
Status: DISCONTINUED | OUTPATIENT
Start: 2022-09-27 | End: 2022-09-27

## 2022-09-27 RX ORDER — FENTANYL CITRATE 50 UG/ML
INJECTION, SOLUTION INTRAMUSCULAR; INTRAVENOUS
Status: DISCONTINUED | OUTPATIENT
Start: 2022-09-27 | End: 2022-09-27

## 2022-09-27 RX ORDER — KETAMINE HCL IN 0.9 % NACL 50 MG/5 ML
SYRINGE (ML) INTRAVENOUS
Status: DISCONTINUED | OUTPATIENT
Start: 2022-09-27 | End: 2022-09-27

## 2022-09-27 RX ORDER — HYDROMORPHONE HYDROCHLORIDE 2 MG/ML
0.4 INJECTION, SOLUTION INTRAMUSCULAR; INTRAVENOUS; SUBCUTANEOUS EVERY 5 MIN PRN
Status: DISCONTINUED | OUTPATIENT
Start: 2022-09-27 | End: 2022-09-27 | Stop reason: HOSPADM

## 2022-09-27 RX ORDER — DEXAMETHASONE SODIUM PHOSPHATE 4 MG/ML
INJECTION, SOLUTION INTRA-ARTICULAR; INTRALESIONAL; INTRAMUSCULAR; INTRAVENOUS; SOFT TISSUE
Status: DISCONTINUED | OUTPATIENT
Start: 2022-09-27 | End: 2022-09-27

## 2022-09-27 RX ORDER — PROPOFOL 10 MG/ML
VIAL (ML) INTRAVENOUS
Status: DISCONTINUED | OUTPATIENT
Start: 2022-09-27 | End: 2022-09-27

## 2022-09-27 RX ORDER — LIDOCAINE HYDROCHLORIDE 10 MG/ML
0.5 INJECTION, SOLUTION EPIDURAL; INFILTRATION; INTRACAUDAL; PERINEURAL ONCE
Status: DISCONTINUED | OUTPATIENT
Start: 2022-09-27 | End: 2022-09-27 | Stop reason: HOSPADM

## 2022-09-27 RX ORDER — OXYCODONE HYDROCHLORIDE 5 MG/1
5 TABLET ORAL
Status: DISCONTINUED | OUTPATIENT
Start: 2022-09-27 | End: 2022-09-27 | Stop reason: HOSPADM

## 2022-09-27 RX ORDER — CEFAZOLIN SODIUM 1 G/3ML
2 INJECTION, POWDER, FOR SOLUTION INTRAMUSCULAR; INTRAVENOUS
Status: DISCONTINUED | OUTPATIENT
Start: 2022-09-27 | End: 2022-09-27 | Stop reason: HOSPADM

## 2022-09-27 RX ORDER — MIDAZOLAM HYDROCHLORIDE 1 MG/ML
INJECTION INTRAMUSCULAR; INTRAVENOUS
Status: DISCONTINUED | OUTPATIENT
Start: 2022-09-27 | End: 2022-09-27

## 2022-09-27 RX ORDER — SODIUM CHLORIDE 0.9 G/100ML
IRRIGANT IRRIGATION
Status: DISCONTINUED | OUTPATIENT
Start: 2022-09-27 | End: 2022-09-27 | Stop reason: HOSPADM

## 2022-09-27 RX ORDER — DIPHENHYDRAMINE HYDROCHLORIDE 50 MG/ML
12.5 INJECTION INTRAMUSCULAR; INTRAVENOUS EVERY 30 MIN PRN
Status: DISCONTINUED | OUTPATIENT
Start: 2022-09-27 | End: 2022-09-27 | Stop reason: HOSPADM

## 2022-09-27 RX ORDER — TRANEXAMIC ACID 100 MG/ML
INJECTION, SOLUTION INTRAVENOUS
Status: DISCONTINUED | OUTPATIENT
Start: 2022-09-27 | End: 2022-09-27

## 2022-09-27 RX ORDER — CLINDAMYCIN HYDROCHLORIDE 300 MG/1
300 CAPSULE ORAL EVERY 8 HOURS
Qty: 10 CAPSULE | Refills: 0 | Status: SHIPPED | OUTPATIENT
Start: 2022-09-27 | End: 2022-11-03

## 2022-09-27 RX ADMIN — TRANEXAMIC ACID 1000 MG: 100 INJECTION, SOLUTION INTRAVENOUS at 07:09

## 2022-09-27 RX ADMIN — EPHEDRINE SULFATE 20 MG: 50 INJECTION INTRAVENOUS at 07:09

## 2022-09-27 RX ADMIN — ROCURONIUM BROMIDE 10 MG: 10 SOLUTION INTRAVENOUS at 09:09

## 2022-09-27 RX ADMIN — TRANEXAMIC ACID 1000 MG: 100 INJECTION, SOLUTION INTRAVENOUS at 09:09

## 2022-09-27 RX ADMIN — PROPOFOL 100 MG: 10 INJECTION, EMULSION INTRAVENOUS at 07:09

## 2022-09-27 RX ADMIN — LIDOCAINE HYDROCHLORIDE 50 MG: 20 INJECTION, SOLUTION INTRAVENOUS at 07:09

## 2022-09-27 RX ADMIN — SODIUM CHLORIDE, SODIUM LACTATE, POTASSIUM CHLORIDE, AND CALCIUM CHLORIDE: 600; 310; 30; 20 INJECTION, SOLUTION INTRAVENOUS at 08:09

## 2022-09-27 RX ADMIN — Medication 50 MG: at 07:09

## 2022-09-27 RX ADMIN — EPHEDRINE SULFATE 20 MG: 50 INJECTION INTRAVENOUS at 08:09

## 2022-09-27 RX ADMIN — CLINDAMYCIN PHOSPHATE 900 MG: 18 INJECTION, SOLUTION INTRAVENOUS at 07:09

## 2022-09-27 RX ADMIN — SODIUM CHLORIDE, SODIUM LACTATE, POTASSIUM CHLORIDE, AND CALCIUM CHLORIDE: 600; 310; 30; 20 INJECTION, SOLUTION INTRAVENOUS at 09:09

## 2022-09-27 RX ADMIN — ONDANSETRON HYDROCHLORIDE 4 MG: 2 INJECTION INTRAMUSCULAR; INTRAVENOUS at 10:09

## 2022-09-27 RX ADMIN — SODIUM CHLORIDE, SODIUM LACTATE, POTASSIUM CHLORIDE, AND CALCIUM CHLORIDE: 600; 310; 30; 20 INJECTION, SOLUTION INTRAVENOUS at 06:09

## 2022-09-27 RX ADMIN — CARBOXYMETHYLCELLULOSE SODIUM 2 DROP: 2.5 SOLUTION/ DROPS OPHTHALMIC at 07:09

## 2022-09-27 RX ADMIN — GLYCOPYRROLATE 0.2 MG: 0.2 INJECTION, SOLUTION INTRAMUSCULAR; INTRAVITREAL at 07:09

## 2022-09-27 RX ADMIN — FENTANYL CITRATE 50 MCG: 50 INJECTION, SOLUTION INTRAMUSCULAR; INTRAVENOUS at 07:09

## 2022-09-27 RX ADMIN — OXYCODONE 5 MG: 5 TABLET ORAL at 10:09

## 2022-09-27 RX ADMIN — ROCURONIUM BROMIDE 20 MG: 10 SOLUTION INTRAVENOUS at 08:09

## 2022-09-27 RX ADMIN — DEXAMETHASONE SODIUM PHOSPHATE 8 MG: 4 INJECTION, SOLUTION INTRAMUSCULAR; INTRAVENOUS at 10:09

## 2022-09-27 RX ADMIN — MIDAZOLAM HYDROCHLORIDE 2 MG: 1 INJECTION, SOLUTION INTRAMUSCULAR; INTRAVENOUS at 07:09

## 2022-09-27 RX ADMIN — HYDROMORPHONE HYDROCHLORIDE 0.4 MG: 2 INJECTION INTRAMUSCULAR; INTRAVENOUS; SUBCUTANEOUS at 10:09

## 2022-09-27 RX ADMIN — FENTANYL CITRATE 50 MCG: 50 INJECTION, SOLUTION INTRAMUSCULAR; INTRAVENOUS at 08:09

## 2022-09-27 RX ADMIN — ROCURONIUM BROMIDE 50 MG: 10 SOLUTION INTRAVENOUS at 07:09

## 2022-09-27 RX ADMIN — SUGAMMADEX 200 MG: 100 INJECTION, SOLUTION INTRAVENOUS at 10:09

## 2022-09-27 RX ADMIN — EPHEDRINE SULFATE 10 MG: 50 INJECTION INTRAVENOUS at 09:09

## 2022-09-27 RX ADMIN — ACETAMINOPHEN 1000 MG: 500 TABLET, FILM COATED ORAL at 05:09

## 2022-09-27 NOTE — TRANSFER OF CARE
"Anesthesia Transfer of Care Note    Patient: Raiza Cherry    Procedure(s) Performed: Procedure(s) (LRB):  MASTOPEXY FULL WITH IMPLANT BAG REMOVAL (Bilateral)    Patient location: PACU    Anesthesia Type: general    Transport from OR: Transported from OR on 2-3 L/min O2 by NC with adequate spontaneous ventilation    Post pain: adequate analgesia    Post vital signs: stable    Level of consciousness: awake, alert and oriented    Nausea/Vomiting: no nausea/vomiting    Complications: none    Transfer of care protocol was followed      Last vitals:   Visit Vitals  /82 (BP Location: Left arm, Patient Position: Sitting)   Pulse 78   Temp 36.5 °C (97.7 °F) (Oral)   Resp 18   Ht 5' 5" (1.651 m)   Wt 53.5 kg (118 lb)   LMP 04/13/2018   SpO2 100%   Breastfeeding No   BMI 19.64 kg/m²     "

## 2022-09-27 NOTE — ANESTHESIA PROCEDURE NOTES
Intubation    Date/Time: 9/27/2022 7:08 AM  Performed by: Emily Le CRNA  Authorized by: Prerna Salgado MD     Intubation:     Induction:  Intravenous    Intubated:  Postinduction    Mask Ventilation:  Easy mask    Attempts:  1    Attempted By:  CRNA    Method of Intubation:  Direct and video laryngoscopy    Blade:  Opal 3    Laryngeal View Grade: Grade I - full view of cords      Difficult Airway Encountered?: No      Complications:  None    Airway Device Size:  7.0    Style/Cuff Inflation:  Cuffed    Inflation Amount (mL):  3    Tube secured:  21    Secured at:  The lips    Placement Verified By:  Capnometry    Complicating Factors:  None    Findings Post-Intubation:  BS equal bilateral

## 2022-09-27 NOTE — PLAN OF CARE
Raiza Cherry has met all discharge criteria from Phase II. Vital Signs are stable, ambulating  without difficulty. Discharge instructions given, patient verbalized understanding. Discharged from facility via wheelchair in stable condition.

## 2022-09-27 NOTE — BRIEF OP NOTE
[facility] - Surgery ([place])  Brief Operative Note    Surgery Date: 9/27/2022     Surgeon(s) and Role:     * Bowen Mckeon Jr., MD - Primary    Assisting Surgeon: None    Pre-op Diagnosis:  Elective procedure for unacceptable cosmetic appearance [Z41.1]  Bilateral deflated saline breast implants    Post-op Diagnosis:  Post-Op Diagnosis Codes:     * Elective procedure for unacceptable cosmetic appearance [Z41.1]  Bilateral deflated textured saline breast implants    Procedure(s) (LRB):  MASTOPEXY FULL WITH IMPLANT BAG REMOVAL (Bilateral)  Bilateral capsulectomies    Anesthesia: General    Operative Findings:  Bilateral textured ruptured saline breast implants in the submuscular position    Estimated Blood Loss: 100 mL         Specimens:   Specimen (24h ago, onward)       Start     Ordered    09/27/22 1017  Specimen to Pathology, Surgery Breast  Once        Comments: Pre-op Diagnosis: Elective procedure for unacceptable cosmetic appearance [Z41.1]Procedure(s):MASTOPEXY FULL WITH IMPLANT BAG REMOVAL Number of specimens: 4Name of specimens: 1. Left Breast Implant (gross Id) 2. Left Breast Capsule 3. Rt breast Implant 4. Rt breast capsule     References:    Click here for ordering Quick Tip   Question Answer Comment   Procedure Type: Breast    Specimen Class: Routine/Screening    Which provider would you like to cc? BOWEN MCKEON JR    Release to patient Immediate        09/27/22 1017                      Discharge Note    OUTCOME: Patient tolerated treatment/procedure well without complication and is now ready for discharge.    DISPOSITION: Home or Self Care    FINAL DIAGNOSIS:  Elective procedure for unacceptable cosmetic appearance    FOLLOWUP: In clinic    DISCHARGE INSTRUCTIONS:    Discharge Procedure Orders   Diet general     Lifting restrictions     Call MD for:  temperature >100.4     Call MD for:  persistent nausea and vomiting     Call MD for:  severe uncontrolled pain     Call MD for:  difficulty  breathing, headache or visual disturbances     Call MD for:  redness, tenderness, or signs of infection (pain, swelling, redness, odor or green/yellow discharge around incision site)     Call MD for:  hives     Call MD for:  persistent dizziness or light-headedness     Call MD for:  extreme fatigue     Leave dressing on - Keep it clean, dry, and intact until clinic visit

## 2022-09-27 NOTE — ANESTHESIA POSTPROCEDURE EVALUATION
Anesthesia Post Evaluation    Patient: Raiza Cherry    Procedure(s) Performed: Procedure(s) (LRB):  MASTOPEXY FULL WITH IMPLANT BAG REMOVAL (Bilateral)    Final Anesthesia Type: general      Patient location during evaluation: PACU  Patient participation: Yes- Able to Participate  Level of consciousness: awake and alert  Post-procedure vital signs: reviewed and stable  Pain management: adequate  Airway patency: patent    PONV status at discharge: No PONV  Anesthetic complications: no      Cardiovascular status: blood pressure returned to baseline and stable  Respiratory status: unassisted, spontaneous ventilation and room air  Hydration status: euvolemic  Follow-up not needed.          Vitals Value Taken Time   /79 09/27/22 1140   Temp 36.6 °C (97.8 °F) 09/27/22 1140   Pulse 71 09/27/22 1140   Resp 18 09/27/22 1140   SpO2 97 % 09/27/22 1140         Event Time   Out of Recovery 11:26:45         Pain/Kourtney Score: Pain Rating Prior to Med Admin: 7 (9/27/2022 10:43 AM)  Pain Rating Post Med Admin: 4 (9/27/2022 11:40 AM)  Kourtney Score: 10 (9/27/2022 12:38 PM)  Modified Kourtney Score: 20 (9/27/2022 12:38 PM)

## 2022-09-27 NOTE — OP NOTE
[facility]  Plastic Surgery  Operative Note    SUMMARY     Date of Procedure: 9/27/2022     Procedure:   Bilateral capsulectomies  Bilateral ruptured textured saline implant removal  Bilateral superior pedicle mastopexy    Surgeon(s) and Role:     * Bowen Mckeon Jr., MD - Primary    Assisting Surgeon: None    Pre-Operative Diagnosis: Elective procedure for unacceptable cosmetic appearance [Z41.1]  Bilateral ruptured saline implants    Post-Operative Diagnosis:   Bilateral ruptured textured saline implants  Elective procedure for unacceptable cosmetic appearance    Anesthesia: General    Operative Findings (including complications, if any):  Bilateral ruptured textured saline implants in the submuscular position.  The capsules were slightly thickened and non transparent      Estimated Blood Loss (EBL): 100 mL           Implants:   Implant Name Type Inv. Item Serial No.  Lot No. LRB No. Used Action   Left breast Implant     [id number] Left 1 Explanted   Rt Breast Implant     [id number] Right 1 Explanted       Specimens:   Specimen (24h ago, onward)       Start     Ordered    09/27/22 1017  Specimen to Pathology, Surgery Breast  Once        Comments: Pre-op Diagnosis: Elective procedure for unacceptable cosmetic appearance [Z41.1]Procedure(s):MASTOPEXY FULL WITH IMPLANT BAG REMOVAL Number of specimens: 4Name of specimens: 1. Left Breast Implant (gross Id) 2. Left Breast Capsule 3. Rt breast Implant 4. Rt breast capsule     References:    Click here for ordering Quick Tip   Question Answer Comment   Procedure Type: Breast    Specimen Class: Routine/Screening    Which provider would you like to cc? BOWEN MCKEON JR    Release to patient Immediate        09/27/22 1017                 Drains: 2 -10 fr frantz drains           Condition: Good    Disposition: PACU - hemodynamically stable.    Attestation: I was present and scrubbed for the entire procedure.    Indications:    Raiza  Jessica Cherry is a 52-year-old female with a past medical history of bilateral breast augmentation using textured saline breast implants.  The patient this like the appearance of her breasts and was seeking implant removal.  She was also dissatisfied with the position of the breast on the chest.  We performed an in office deflation procedure prior to surgery.  The patient went several months and decided that she would like implant removal with a mastopexy.  We discussed the risks benefits alternatives in detail.  Informed consent was obtained the patient was scheduled for the procedure.    Procedure in detail  After proper identification of the patient preoperative area where mastopexy marks in a Wise pattern were placed to both breast.  She was then wheeled to operative room on a hospital stretcher.  She was placed on the operating table in a supine position.  Pressure points were padded and checked this time.  A time-out was called with surgeons nurses and anesthesia agreed upon the patient and procedure.  She was then induced under general anesthesia via endotracheal tube.  Her arms were secured to the arm boards.  An indwelling Loco catheter was placed into the patient's bladder using sterile technique.  She was given clindamycin prophylactic antibiotics.  Her chest and breast were then prepped and draped in usual sterile fashion.  First turned my attention to the left breast.  A 38 mm cookie cutter was used to find a new nipple areolar complex.  The nipple was then incised down to the dermis.  The Sotelo pattern was also incised down to the dermis.  The entire Sotelo pattern was then de-epithelialized using a 10 blade scalpel.  A curvilinear incision was made at the base of the nipple areolar complex where the previous periareolar approach was made.  This was carried down through the breast parenchyma to into the breast capsule.  The capsule was then incised and a deflated textured implant was removed from the  left breast and passed to pathology for gross only.  Next the medial and lateral pillars were then defined down to the breast capsule.  The central portion within the Wise pattern was left pedicled the inframammary tissue.  It was then noted the capsule was thickened not transparent it was then decided to perform a capsulectomy due to the fact that the patient had recalled textured implants.  The capsule was carefully dissected as safely as possible.  Capsule was then passed off the field and sent to pathology.  Next the pocket was irrigated copious amounts normal saline hemostasis was achieved using electrocautery.  The pocket was then packed with moistened lap sponges and I then turned my attention to the right breast.  A 38 mm cookie cutter was used to find a new nipple areolar complex.  The nipple was then incised down to the dermis.  The Sotelo pattern was also incised down to the dermis.  The entire Sotelo pattern was then de-epithelialized using a 10 blade scalpel.  A curvilinear incision was made at the base of the nipple areolar complex where the previous periareolar approach was made.  This was carried down through the breast parenchyma to into the right breast capsule.  The capsule was then incised and a deflated textured implant was removed from the left breast and passed to pathology for gross only.  Next the medial and lateral pillars were then defined down to the breast capsule.  The central portion within the Wise pattern was left pedicled the inframammary tissue.  It was then noted the capsule was thickened not transparent it was then decided to perform a capsulectomy due to the fact that the patient had recalled textured implants.  The capsule was carefully dissected as safely as possible.  Capsule was then passed off the field and sent to pathology.  Next the pocket was irrigated copious amounts normal saline hemostasis was achieved using electrocautery.  The pocket was then packed with moistened lap  sponges.  Next then turned my attention back to the left breast.  The lap sponges were removed and hemostasis was double checked.  Next skin flaps were then raised circumferentially around the nipple-areolar complex for creating a superior pedicle.  Next a 10 Congolese Nash drain was then introduced into the breast and sutured to the skin using a 4-0 nylon suture.  The breast tissue pedicle lysed inferiorly was then advanced beneath the breast mound and sutured to the chest wall using 2-0 Vicryl sutures.  After this performed the base of the medial and lateral pedicles were then reapproximated the midline using deep 2-0 Vicryl sutures.  The breast tissue was then inset using 2-0 Vicryl sutures.  The skin was then provisionally closed using skin staples.  Skin was then reapproximated using 3-0 Vicryl sutures in a deep dermal interrupted fashion.  I then turned my attention back to the right breast.The pocket was then packed with moistened lap sponges.  Next then turned my attention back to the left breast.  The lap sponges were removed and hemostasis was double checked.  Next skin flaps were then raised circumferentially around the nipple-areolar complex for creating a superior pedicle.  Next a 10 Congolese Nash drain was then introduced into the breast and sutured to the skin using a 4-0 nylon suture.  The breast tissue pedicle lysed inferiorly was then advanced beneath the breast mound and sutured to the chest wall using 2-0 Vicryl sutures.  After this performed the base of the medial and lateral pedicles were then reapproximated the midline using deep 2-0 Vicryl sutures.  The breast tissue was then inset using 2-0 Vicryl sutures.  The skin was then provisionally closed using skin staples.  The patient was then placed in the upright position.  I then assessed for symmetry and position of the breast.  I was satisfied with the appearance of the breast as well as the symmetry.  She was then placed back into the supine  position.  Skin was then reapproximated using 3-0 Vicryl sutures in a deep dermal interrupted fashion.  Next the skin of each breast was then closed using a 4-0 Monocryl in a subcuticular fashion.  The drains were placed to bulb suction.  The wounds were then cleaned and dried.  Steri-Strips then placed to all incisions.  The patches were placed to each drain site covered with a Tegaderm.  This concluded the procedure the patient tolerated procedure well without any complications at the end of the case needle and sponge counts were correct x2 I was present and scrubbed during all aspects of procedure.  She was then placed into a postoperative bra with ABD padding.  She was woken from anesthesia and taken to PACU further recovery.

## 2022-09-27 NOTE — DISCHARGE INSTRUCTIONS

## 2022-09-28 VITALS
BODY MASS INDEX: 19.66 KG/M2 | DIASTOLIC BLOOD PRESSURE: 79 MMHG | SYSTOLIC BLOOD PRESSURE: 131 MMHG | TEMPERATURE: 98 F | HEIGHT: 65 IN | HEART RATE: 71 BPM | WEIGHT: 118 LBS | OXYGEN SATURATION: 97 % | RESPIRATION RATE: 18 BRPM

## 2022-10-05 LAB
FINAL PATHOLOGIC DIAGNOSIS: NORMAL
GROSS: NORMAL
Lab: NORMAL

## 2022-11-03 ENCOUNTER — OFFICE VISIT (OUTPATIENT)
Dept: OBSTETRICS AND GYNECOLOGY | Facility: CLINIC | Age: 52
End: 2022-11-03
Attending: OBSTETRICS & GYNECOLOGY

## 2022-11-03 VITALS — BODY MASS INDEX: 19.47 KG/M2 | WEIGHT: 116.88 LBS | HEIGHT: 65 IN

## 2022-11-03 DIAGNOSIS — Z01.419 ENCOUNTER FOR GYNECOLOGICAL EXAMINATION: Primary | ICD-10-CM

## 2022-11-03 PROCEDURE — 99396 PREV VISIT EST AGE 40-64: CPT | Mod: S$GLB,,, | Performed by: OBSTETRICS & GYNECOLOGY

## 2022-11-03 PROCEDURE — 99396 PR PREVENTIVE VISIT,EST,40-64: ICD-10-PCS | Mod: S$GLB,,, | Performed by: OBSTETRICS & GYNECOLOGY

## 2022-11-03 PROCEDURE — 99999 PR PBB SHADOW E&M-EST. PATIENT-LVL III: CPT | Mod: PBBFAC,,, | Performed by: OBSTETRICS & GYNECOLOGY

## 2022-11-03 PROCEDURE — 99999 PR PBB SHADOW E&M-EST. PATIENT-LVL III: ICD-10-PCS | Mod: PBBFAC,,, | Performed by: OBSTETRICS & GYNECOLOGY

## 2022-11-03 RX ORDER — CETIRIZINE HYDROCHLORIDE 10 MG/1
10 TABLET ORAL DAILY
COMMUNITY
Start: 2022-10-06 | End: 2023-01-31

## 2022-11-03 RX ORDER — AZELASTINE 1 MG/ML
2 SPRAY, METERED NASAL 2 TIMES DAILY
COMMUNITY
Start: 2022-10-06 | End: 2022-11-03

## 2022-11-03 RX ORDER — CLOBETASOL PROPIONATE 0.05 G/100ML
SHAMPOO TOPICAL
COMMUNITY
Start: 2022-10-03

## 2022-11-03 RX ORDER — TIMOLOL MALEATE 5 MG/ML
1 SOLUTION/ DROPS OPHTHALMIC 2 TIMES DAILY
COMMUNITY
Start: 2022-10-24

## 2022-11-03 RX ORDER — HYDROXYCHLOROQUINE SULFATE 200 MG/1
200 TABLET, FILM COATED ORAL 2 TIMES DAILY
COMMUNITY
Start: 2022-10-21

## 2022-11-03 RX ORDER — MINOCYCLINE HYDROCHLORIDE 100 MG/1
100 CAPSULE ORAL DAILY
COMMUNITY
Start: 2022-09-18 | End: 2023-01-31

## 2022-11-03 NOTE — PROGRESS NOTES
LMP: Patient's last menstrual period was 2018..    Meds per MD: none    Last Pap:  pap & hpv negative  Last MM2022 birads 1   Last Colonoscopy:   Last Bone Density: Never

## 2022-11-04 ENCOUNTER — PATIENT MESSAGE (OUTPATIENT)
Dept: OBSTETRICS AND GYNECOLOGY | Facility: CLINIC | Age: 52
End: 2022-11-04
Payer: COMMERCIAL

## 2023-01-31 ENCOUNTER — OFFICE VISIT (OUTPATIENT)
Dept: GASTROENTEROLOGY | Facility: CLINIC | Age: 53
End: 2023-01-31
Payer: COMMERCIAL

## 2023-01-31 VITALS
DIASTOLIC BLOOD PRESSURE: 83 MMHG | HEART RATE: 65 BPM | TEMPERATURE: 98 F | OXYGEN SATURATION: 100 % | SYSTOLIC BLOOD PRESSURE: 134 MMHG | BODY MASS INDEX: 19.92 KG/M2 | WEIGHT: 119.69 LBS

## 2023-01-31 DIAGNOSIS — L65.9 HAIR LOSS: ICD-10-CM

## 2023-01-31 DIAGNOSIS — K51.311 ULCERATIVE RECTOSIGMOIDITIS WITH RECTAL BLEEDING: Primary | ICD-10-CM

## 2023-01-31 PROCEDURE — 3008F BODY MASS INDEX DOCD: CPT | Mod: CPTII,S$GLB,, | Performed by: INTERNAL MEDICINE

## 2023-01-31 PROCEDURE — 3008F PR BODY MASS INDEX (BMI) DOCUMENTED: ICD-10-PCS | Mod: CPTII,S$GLB,, | Performed by: INTERNAL MEDICINE

## 2023-01-31 PROCEDURE — 3079F DIAST BP 80-89 MM HG: CPT | Mod: CPTII,S$GLB,, | Performed by: INTERNAL MEDICINE

## 2023-01-31 PROCEDURE — 1159F MED LIST DOCD IN RCRD: CPT | Mod: CPTII,S$GLB,, | Performed by: INTERNAL MEDICINE

## 2023-01-31 PROCEDURE — 3075F PR MOST RECENT SYSTOLIC BLOOD PRESS GE 130-139MM HG: ICD-10-PCS | Mod: CPTII,S$GLB,, | Performed by: INTERNAL MEDICINE

## 2023-01-31 PROCEDURE — 99214 PR OFFICE/OUTPT VISIT, EST, LEVL IV, 30-39 MIN: ICD-10-PCS | Mod: S$GLB,,, | Performed by: INTERNAL MEDICINE

## 2023-01-31 PROCEDURE — 1159F PR MEDICATION LIST DOCUMENTED IN MEDICAL RECORD: ICD-10-PCS | Mod: CPTII,S$GLB,, | Performed by: INTERNAL MEDICINE

## 2023-01-31 PROCEDURE — 1160F RVW MEDS BY RX/DR IN RCRD: CPT | Mod: CPTII,S$GLB,, | Performed by: INTERNAL MEDICINE

## 2023-01-31 PROCEDURE — 99214 OFFICE O/P EST MOD 30 MIN: CPT | Mod: S$GLB,,, | Performed by: INTERNAL MEDICINE

## 2023-01-31 PROCEDURE — 1160F PR REVIEW ALL MEDS BY PRESCRIBER/CLIN PHARMACIST DOCUMENTED: ICD-10-PCS | Mod: CPTII,S$GLB,, | Performed by: INTERNAL MEDICINE

## 2023-01-31 PROCEDURE — 3075F SYST BP GE 130 - 139MM HG: CPT | Mod: CPTII,S$GLB,, | Performed by: INTERNAL MEDICINE

## 2023-01-31 PROCEDURE — 3079F PR MOST RECENT DIASTOLIC BLOOD PRESSURE 80-89 MM HG: ICD-10-PCS | Mod: CPTII,S$GLB,, | Performed by: INTERNAL MEDICINE

## 2023-01-31 RX ORDER — BALSALAZIDE DISODIUM 750 MG/1
2250 CAPSULE ORAL 3 TIMES DAILY
Qty: 270 CAPSULE | Refills: 11 | Status: SHIPPED | OUTPATIENT
Start: 2023-01-31 | End: 2023-02-01

## 2023-01-31 RX ORDER — DOXYCYCLINE 100 MG/1
1 TABLET ORAL 2 TIMES DAILY
COMMUNITY
Start: 2022-11-14 | End: 2023-03-14

## 2023-01-31 RX ORDER — FLUOCINOLONE ACETONIDE 0.11 MG/ML
OIL TOPICAL NIGHTLY
COMMUNITY
Start: 2022-12-22

## 2023-01-31 RX ORDER — DOXYCYCLINE HYCLATE 20 MG
20 TABLET ORAL 2 TIMES DAILY
COMMUNITY
Start: 2023-01-06 | End: 2023-11-02

## 2023-01-31 RX ORDER — CYCLOSPORINE 50 MG/1
50 CAPSULE, LIQUID FILLED ORAL DAILY
COMMUNITY
Start: 2023-01-04

## 2023-01-31 NOTE — PROGRESS NOTES
[facility]          Inflammatory Bowel Disease Follow Up Consultation Note         TODAY'S VISIT DATE:  1/31/2023    Reason for Consult:    Chief Complaint   Patient presents with    Ulcerative Colitis       PCP: Per Saab      Referring MD:   No ref. provider found    History of Present Illness:  Raiza Cherry who is a 53 y.o. female is being seen today at the [facility] on 01/31/2023 for inflammatory bowel disease- ulcerative colitis.  She is here today for a follow-up visit she is here today to follow-up for her colitis.  She reports that from a colitis standpoint she is been doing fairly well.  She denies any diarrhea.  She has 2-3 formed bowel movements daily with no blood in the stools.  She continues to have issues with hair loss.  After our last visit she had a stool calprotectin performed that was markedly elevated.  A sigmoidoscopy with biopsy was recommended but she was unable to schedule this because of her work schedule as well as another surgery and the death of her mother.  She continues to take Lialda 4.8 g daily.    IBD History:  She has a history of left-sided ulcerative colitis.  This was diagnosed around 1990.  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  She has been managed with multiple 5 ASA products (both oral and rectal) as well as prednisone (tolerated very poorly) and Entocort. Her last colonoscopy was in October 2018 at which time there was some mild proctitis and some atrophy in the left colon.  Earlier this year she saw Dr. Gil because of active symptoms.  She started her on Lialda 4.8 g daily and Canasa suppositories twice daily initially.  After she was doing better these were reduced down to once daily.  Eventually she stop the Canasa suppositories and was  only taking Lialda 4.8 g daily.  She noted a significant increase in her symptoms after being off of the suppositories.  In January 2022 she underwent colonoscopy that showed active inflammation of the left colon.  At that time she reported that she was not taking Lialda for the last few months and was only taking Canasa suppositories.  She started taking Lialda 4.8 g daily and Canasa suppositories daily on a consistent basis in early 2022. A stool calprotectin level in early May 2022 was markedly elevated.  She did not submit a sample prior to starting the Lialda so I do not have a baseline to compare to.    IBD Details:  Dx Date:  1990  Disease type/distribution:  Ulcerative colitis/left colon disease extending to the descending colon  Current Treatment:  Lialda 4.8 g daily/Canasa suppositories daily  Start Date:  July 2020  Response:  Incomplete  Optimized:  Yes  Adverse reactions:  None  Prior surgeries:  None  CRP Elevation:  No  Disease Complications:  None  Extraintestinal manifestations:  None  Prior treatments:   Steroids:  Good response  5ASA:  Incomplete response  IMM:  None  TNF Inh:  None   Anti-Integrin:  None   IL 12/23:  None  ELIZABETH Inh:  None    Previous Clinical Trials:  None    Last Colonoscopy:   January 2022-inflammation in the rectum, sigmoid, descending colon, otherwise normal     October 2018-rectal inflammation, atrophy of the left colon, otherwise normal    Other Endoscopies:  None    Imaging:   MRE:  None   CT:  None   Other:  None    Pertinent Labs:  Lab Results   Component Value Date    SEDRATE 6 06/12/2019    CRP 0.5 07/29/2022     Lab Results   Component Value Date    TTGIGA 5 07/29/2022     07/29/2022     Lab Results   Component Value Date    TSH 3.064 07/29/2022    FREET4 0.84 10/13/2021     Lab Results   Component Value Date    WARNNWLX84AK 69 07/29/2022    YUMJGQSU69 >2000 (H) 07/29/2022     Lab Results   Component Value Date    HEPBSAG Negative 10/13/2021    HEPBCAB Negative  10/13/2021    HEPCAB Negative 10/13/2021     Lab Results   Component Value Date    XNN61VZDG Negative 10/13/2021     Lab Results   Component Value Date    NIL 0.030 10/13/2021    MITOGENNIL >10.000 10/13/2021     Lab Results   Component Value Date    TPTMINTERP SEE BELOW 10/13/2021     Lab Results   Component Value Date    CDIFFICILEAN Negative 07/21/2020    CDIFFTOX Negative 07/21/2020     Lab Results   Component Value Date    CALPROTECTIN 2,411.0 (H) 08/11/2022       Therapeutic Drug Monitoring Labs:  No results found for: PROMETH  No results found for: ANSADAINIT, INFLIXIMAB, INFLIXINTERP    Vaccinations:  Lab Results   Component Value Date    HEPBSAB Negative 10/13/2021     Lab Results   Component Value Date    HEPAIGG Negative 10/13/2021     Lab Results   Component Value Date    VARICELLAZOS 4.03 (H) 10/13/2021    VARICELLAINT Positive (A) 10/13/2021     Immunization History   Administered Date(s) Administered    COVID-19, MRNA, LN-S, PF (Pfizer) (Purple Cap) 03/06/2021, 03/27/2021, 12/04/2021    Influenza 09/30/2019, 10/01/2020, 11/01/2021    Pneumococcal Conjugate - 13 Valent 10/15/2020    Pneumococcal Polysaccharide - 23 Valent 02/04/2022    Tdap 09/23/2021    Zoster Recombinant 02/04/2022, 04/11/2022         Review of Systems  Review of Systems   Constitutional:  Negative for chills, fever and weight loss.   HENT:  Negative for sore throat.    Eyes:  Negative for pain, discharge and redness.   Respiratory:  Negative for cough, shortness of breath and wheezing.    Cardiovascular:  Negative for chest pain, orthopnea and leg swelling.   Gastrointestinal:  Negative for abdominal pain, blood in stool, constipation, diarrhea, heartburn, melena, nausea and vomiting.   Genitourinary:  Negative for dysuria, frequency and urgency.   Musculoskeletal:  Negative for back pain, joint pain and myalgias.   Skin:  Negative for itching and rash.        Hair loss   Neurological:  Negative for focal weakness and seizures.    Endo/Heme/Allergies:  Does not bruise/bleed easily.   Psychiatric/Behavioral:  Negative for depression. The patient is not nervous/anxious.           Medical History:   Past Medical History:   Diagnosis Date    Abnormal Pap smear of cervix     LEEP 2016, 9-2017 LGSIL, colpo  mildly abnl cells,     Cataract     Chronic diarrhea     Glaucoma     Ulcerative colitis     proctitis       Surgical History:  Past Surgical History:   Procedure Laterality Date    AUGMENTATION OF BREAST      implants were deflated by a doctor and will be removed 9/2022    BREAST SURGERY  2014, 2018    enlargement and lift of both breasts    BREAST SURGERY Bilateral 09/2022    implants removed    CATARACT EXTRACTION W/  INTRAOCULAR LENS IMPLANT Right 10/06/2021    DR.ELLEN BULLOCK    CATARACT EXTRACTION W/  INTRAOCULAR LENS IMPLANT Left 2013    DR.ELLEN BULLOCK    CERVICAL BIOPSY  W/ LOOP ELECTRODE EXCISION  02/2016    Moderate dysplasia completely excised margins clear and ECC negative    COLONOSCOPY N/A 01/19/2022    Procedure: COLONOSCOPY;  Surgeon: Bran Breaux MD;  Location: [address]);  Service: Endoscopy;  Laterality: N/A;  fully vaccinated 3/27/21, instructions sent to [email or web address]      COVID test at [place] on 1/16-GT    MASTOPEXY Bilateral 09/27/2022    Procedure: MASTOPEXY FULL WITH IMPLANT BAG REMOVAL;  Surgeon: Armen Reynolds Jr., MD;  Location: [facility];  Service: Plastics;  Laterality: Bilateral;  2HRS       Family History:   Family History   Problem Relation Age of Onset    Lung disease Father     Glaucoma Father     Diabetes Mother     Heart disease Mother     Lupus Sister     Asthma Sister     No Known Problems Sister     No Known Problems Sister     No Known Problems Son     No Known Problems Daughter     No Known Problems Daughter     Breast cancer Neg Hx     Colon cancer Neg Hx     Ovarian cancer Neg Hx     Cancer Neg Hx     Amblyopia Neg Hx     Blindness Neg Hx     Macular degeneration  Neg Hx     Retinal detachment Neg Hx     Strabismus Neg Hx        Social History:   Social History     Tobacco Use    Smoking status: Never    Smokeless tobacco: Never   Substance Use Topics    Alcohol use: Yes     Comment: Rarely    Drug use: No       Allergies: Reviewed    Home Medications:   Medication List with Changes/Refills   New Medications    BALSALAZIDE (COLAZAL) 750 MG CAPSULE    Take 3 capsules (2,250 mg total) by mouth 3 (three) times daily. Take 5 pills in the morning and 4 pills in evening for total of 9 pills   Current Medications    ASCORBIC ACID, VITAMIN C, (VITAMIN C) 100 MG TABLET    Take 100 mg by mouth once daily.    CHOLECALCIFEROL, VITAMIN D3, (VITAMIN D3 ORAL)    Take by mouth once daily. Not sure    CLOBETASOL (CLOBEX) 0.05 % SHAMPOO    SMARTSIG:Topical 2-3 Times Weekly    CYCLOSPORINE MODIFIED, NEORAL, (NEORAL) 50 MG CAPSULE    Take 50 mg by mouth 2 (two) times daily.    DOXYCYCLINE (PERIOSTAT) 20 MG TABLET    Take 20 mg by mouth 2 (two) times daily.    DOXYCYCLINE MONOHYDRATE 100 MG TAB    Take 1 tablet by mouth 2 (two) times daily.    FLUOCINOLONE AND SHOWER CAP 0.01 % OIL    Apply topically every evening.    HYDROXYCHLOROQUINE (PLAQUENIL) 200 MG TABLET    Take 200 mg by mouth 2 (two) times daily.    LATANOPROST 0.005 % OPHTHALMIC SOLUTION    Place 1 drop into both eyes nightly.    LIALDA 1.2 GRAM TBEC    TAKE 4 TABLETS BY MOUTH DAILY WITH BREAKFAST.    MESALAMINE (CANASA) 1000 MG SUPP    PLACE 1 SUPPOSITORY (1,000 MG TOTAL) RECTALLY 2 (TWO) TIMES DAILY. (INSURANCE COVERS ONLY 1 DAILY)    TIMOLOL MALEATE 0.5% (TIMOPTIC) 0.5 % DROP    Place 1 drop into both eyes 2 (two) times daily.    TRETINOIN (RETIN-A) 0.05 % CREAM    Apply topically nightly.    UNABLE TO FIND    Take by mouth once daily. Nutrafol   Discontinued Medications    ACETAMINOPHEN (TYLENOL ORAL)    Take by mouth as needed.    CETIRIZINE (ZYRTEC) 10 MG TABLET    Take 10 mg by mouth once daily.    MINOCYCLINE (MINOCIN,DYNACIN)  100 MG CAPSULE    Take 100 mg by mouth once daily.    TIMOLOL OPHT    Apply 1 drop to eye every morning.       Physical Exam:  Vital Signs:  /83 (BP Location: Left arm, Patient Position: Sitting)   Pulse 65   Temp 97.9 °F (36.6 °C)   Wt 54.3 kg (119 lb 11.4 oz)   LMP 04/13/2018   SpO2 100%   BMI 19.92 kg/m²   Body mass index is 19.92 kg/m².    Physical Exam  Constitutional:       General: She is not in acute distress.     Appearance: She is well-developed.   Eyes:      Conjunctiva/sclera: Conjunctivae normal.      Pupils: Pupils are equal, round, and reactive to light.   Neck:      Thyroid: No thyromegaly.   Cardiovascular:      Rate and Rhythm: Normal rate and regular rhythm.      Heart sounds: Normal heart sounds. No murmur heard.  Pulmonary:      Effort: Pulmonary effort is normal.      Breath sounds: Normal breath sounds. No wheezing or rales.   Abdominal:      General: Bowel sounds are normal. There is no distension.      Palpations: Abdomen is soft. There is no mass.      Tenderness: There is no abdominal tenderness.   Musculoskeletal:         General: No tenderness. Normal range of motion.   Lymphadenopathy:      Cervical: No cervical adenopathy.   Skin:     General: Skin is warm and dry.      Findings: No erythema or rash.   Neurological:      Mental Status: She is alert and oriented to person, place, and time.   Psychiatric:         Behavior: Behavior normal.       Labs: reviewed and pertinent noted above    Assessment/Plan:  Raiza Cherry is a 53 y.o. female with left-sided ulcerative colitis. The following issues were addresssed:    1. Ulcerative rectosigmoiditis with rectal bleeding    2. Hair loss      1.  Ulcerative colitis:  Clinically she is doing well but with her markedly elevated stool calprotectin level I am concerned that she still has ongoing active disease in spite of 4.8 g daily of Lialda.  I would like to get her set up for a sigmoidoscopy with biopsies in the near future  to rule out infection.  If there is evidence of active disease which I suspect there will be, we will plan to proceed with other treatment options.  We discussed multiple treatment options today in detail including Zeposia, Entyvio, Stelara, and TNF inhibitors.  We discussed the risks and benefits of each of these medications in detail as well as the method of administration and expected time to onset of action.  She is going to think about these options while we arrange for the sigmoidoscopy.    2. Hair loss:  She continues to have hair loss and some nail issues that have been ongoing in spite of treatment with her dermatologist.  We discussed the fact that it may be that some of these issues related active IBD.  I would doubt that Lialda would cause these issues.          # IBD specific health maintenance:  Colon cancer surveillance:  Up-to-date    Annual:  - Eye exam:  Not applicable  - Skin exam (if on IMM/TNF):  Up-to-date  - reminded pt to use sunblock/hats/sunprotective clothing  - PAP (if immunosuppressed):  June 2021    DEXA:  Not applicable    Vitamin D:  Normal    Vaccines:    Influenza:  Up-to-date   Pneumovax:     PCV13:  Received    PSV23:  Up-to-date   HAV:  Discuss in the future   HBV:  Discuss in the   Tdap:  Needs to be updated   MMR:  Immune   VZV:  Immune   HZV:  Up-to-date   HPV:  Not applicable   Meningococcus:  Not applicable   COVID:  Completed series and booster      Follow up: Follow up in about 6 months (around 7/31/2023).    Thank you again for sending Raiza Cherry to see Dr. Shakir Breaux today at the [facility]. Please don't hesitate to contact Dr. Breaux if there are any questions regarding this evaluation, or if you have any other patients with inflammatory bowel disease for whom you would like a consultation. You can reach Dr. Breaux at [phone] or by email at [email or web address]    Bran Breaux MD  Department of  Gastroenterology  Inflammatory Bowel Disease

## 2023-01-31 NOTE — H&P (VIEW-ONLY)
[facility]          Inflammatory Bowel Disease Follow Up Consultation Note         TODAY'S VISIT DATE:  1/31/2023    Reason for Consult:    Chief Complaint   Patient presents with    Ulcerative Colitis       PCP: Per Saab      Referring MD:   No ref. provider found    History of Present Illness:  Raiza Cherry who is a 53 y.o. female is being seen today at the [facility] on 01/31/2023 for inflammatory bowel disease- ulcerative colitis.  She is here today for a follow-up visit she is here today to follow-up for her colitis.  She reports that from a colitis standpoint she is been doing fairly well.  She denies any diarrhea.  She has 2-3 formed bowel movements daily with no blood in the stools.  She continues to have issues with hair loss.  After our last visit she had a stool calprotectin performed that was markedly elevated.  A sigmoidoscopy with biopsy was recommended but she was unable to schedule this because of her work schedule as well as another surgery and the death of her mother.  She continues to take Lialda 4.8 g daily.    IBD History:  She has a history of left-sided ulcerative colitis.  This was diagnosed around 1990.  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  She has been managed with multiple 5 ASA products (both oral and rectal) as well as prednisone (tolerated very poorly) and Entocort. Her last colonoscopy was in October 2018 at which time there was some mild proctitis and some atrophy in the left colon.  Earlier this year she saw Dr. Gil because of active symptoms.  She started her on Lialda 4.8 g daily and Canasa suppositories twice daily initially.  After she was doing better these were reduced down to once daily.  Eventually she stop the Canasa suppositories and was  only taking Lialda 4.8 g daily.  She noted a significant increase in her symptoms after being off of the suppositories.  In January 2022 she underwent colonoscopy that showed active inflammation of the left colon.  At that time she reported that she was not taking Lialda for the last few months and was only taking Canasa suppositories.  She started taking Lialda 4.8 g daily and Canasa suppositories daily on a consistent basis in early 2022. A stool calprotectin level in early May 2022 was markedly elevated.  She did not submit a sample prior to starting the Lialda so I do not have a baseline to compare to.    IBD Details:  Dx Date:  1990  Disease type/distribution:  Ulcerative colitis/left colon disease extending to the descending colon  Current Treatment:  Lialda 4.8 g daily/Canasa suppositories daily  Start Date:  July 2020  Response:  Incomplete  Optimized:  Yes  Adverse reactions:  None  Prior surgeries:  None  CRP Elevation:  No  Disease Complications:  None  Extraintestinal manifestations:  None  Prior treatments:   Steroids:  Good response  5ASA:  Incomplete response  IMM:  None  TNF Inh:  None   Anti-Integrin:  None   IL 12/23:  None  ELIZABETH Inh:  None    Previous Clinical Trials:  None    Last Colonoscopy:   January 2022-inflammation in the rectum, sigmoid, descending colon, otherwise normal     October 2018-rectal inflammation, atrophy of the left colon, otherwise normal    Other Endoscopies:  None    Imaging:   MRE:  None   CT:  None   Other:  None    Pertinent Labs:  Lab Results   Component Value Date    SEDRATE 6 06/12/2019    CRP 0.5 07/29/2022     Lab Results   Component Value Date    TTGIGA 5 07/29/2022     07/29/2022     Lab Results   Component Value Date    TSH 3.064 07/29/2022    FREET4 0.84 10/13/2021     Lab Results   Component Value Date    AWZGXPJV39AB 69 07/29/2022    KLCWRVZM45 >2000 (H) 07/29/2022     Lab Results   Component Value Date    HEPBSAG Negative 10/13/2021    HEPBCAB Negative  10/13/2021    HEPCAB Negative 10/13/2021     Lab Results   Component Value Date    FJG08CLAA Negative 10/13/2021     Lab Results   Component Value Date    NIL 0.030 10/13/2021    MITOGENNIL >10.000 10/13/2021     Lab Results   Component Value Date    TPTMINTERP SEE BELOW 10/13/2021     Lab Results   Component Value Date    CDIFFICILEAN Negative 07/21/2020    CDIFFTOX Negative 07/21/2020     Lab Results   Component Value Date    CALPROTECTIN 2,411.0 (H) 08/11/2022       Therapeutic Drug Monitoring Labs:  No results found for: PROMETH  No results found for: ANSADAINIT, INFLIXIMAB, INFLIXINTERP    Vaccinations:  Lab Results   Component Value Date    HEPBSAB Negative 10/13/2021     Lab Results   Component Value Date    HEPAIGG Negative 10/13/2021     Lab Results   Component Value Date    VARICELLAZOS 4.03 (H) 10/13/2021    VARICELLAINT Positive (A) 10/13/2021     Immunization History   Administered Date(s) Administered    COVID-19, MRNA, LN-S, PF (Pfizer) (Purple Cap) 03/06/2021, 03/27/2021, 12/04/2021    Influenza 09/30/2019, 10/01/2020, 11/01/2021    Pneumococcal Conjugate - 13 Valent 10/15/2020    Pneumococcal Polysaccharide - 23 Valent 02/04/2022    Tdap 09/23/2021    Zoster Recombinant 02/04/2022, 04/11/2022         Review of Systems  Review of Systems   Constitutional:  Negative for chills, fever and weight loss.   HENT:  Negative for sore throat.    Eyes:  Negative for pain, discharge and redness.   Respiratory:  Negative for cough, shortness of breath and wheezing.    Cardiovascular:  Negative for chest pain, orthopnea and leg swelling.   Gastrointestinal:  Negative for abdominal pain, blood in stool, constipation, diarrhea, heartburn, melena, nausea and vomiting.   Genitourinary:  Negative for dysuria, frequency and urgency.   Musculoskeletal:  Negative for back pain, joint pain and myalgias.   Skin:  Negative for itching and rash.        Hair loss   Neurological:  Negative for focal weakness and seizures.    Endo/Heme/Allergies:  Does not bruise/bleed easily.   Psychiatric/Behavioral:  Negative for depression. The patient is not nervous/anxious.           Medical History:   Past Medical History:   Diagnosis Date    Abnormal Pap smear of cervix     LEEP 2016, 9-2017 LGSIL, colpo  mildly abnl cells,     Cataract     Chronic diarrhea     Glaucoma     Ulcerative colitis     proctitis       Surgical History:  Past Surgical History:   Procedure Laterality Date    AUGMENTATION OF BREAST      implants were deflated by a doctor and will be removed 9/2022    BREAST SURGERY  2014, 2018    enlargement and lift of both breasts    BREAST SURGERY Bilateral 09/2022    implants removed    CATARACT EXTRACTION W/  INTRAOCULAR LENS IMPLANT Right 10/06/2021    DR.ELLEN BULLOCK    CATARACT EXTRACTION W/  INTRAOCULAR LENS IMPLANT Left 2013    DR.ELLEN BULLOCK    CERVICAL BIOPSY  W/ LOOP ELECTRODE EXCISION  02/2016    Moderate dysplasia completely excised margins clear and ECC negative    COLONOSCOPY N/A 01/19/2022    Procedure: COLONOSCOPY;  Surgeon: Bran Breaux MD;  Location: [address]);  Service: Endoscopy;  Laterality: N/A;  fully vaccinated 3/27/21, instructions sent to [facility]      COVID test at [place] on 1/16-GT    MASTOPEXY Bilateral 09/27/2022    Procedure: MASTOPEXY FULL WITH IMPLANT BAG REMOVAL;  Surgeon: Armen Reynolds Jr., MD;  Location: [facility];  Service: Plastics;  Laterality: Bilateral;  2HRS       Family History:   Family History   Problem Relation Age of Onset    Lung disease Father     Glaucoma Father     Diabetes Mother     Heart disease Mother     Lupus Sister     Asthma Sister     No Known Problems Sister     No Known Problems Sister     No Known Problems Son     No Known Problems Daughter     No Known Problems Daughter     Breast cancer Neg Hx     Colon cancer Neg Hx     Ovarian cancer Neg Hx     Cancer Neg Hx     Amblyopia Neg Hx     Blindness Neg Hx     Macular degeneration  Neg Hx     Retinal detachment Neg Hx     Strabismus Neg Hx        Social History:   Social History     Tobacco Use    Smoking status: Never    Smokeless tobacco: Never   Substance Use Topics    Alcohol use: Yes     Comment: Rarely    Drug use: No       Allergies: Reviewed    Home Medications:   Medication List with Changes/Refills   New Medications    BALSALAZIDE (COLAZAL) 750 MG CAPSULE    Take 3 capsules (2,250 mg total) by mouth 3 (three) times daily. Take 5 pills in the morning and 4 pills in evening for total of 9 pills   Current Medications    ASCORBIC ACID, VITAMIN C, (VITAMIN C) 100 MG TABLET    Take 100 mg by mouth once daily.    CHOLECALCIFEROL, VITAMIN D3, (VITAMIN D3 ORAL)    Take by mouth once daily. Not sure    CLOBETASOL (CLOBEX) 0.05 % SHAMPOO    SMARTSIG:Topical 2-3 Times Weekly    CYCLOSPORINE MODIFIED, NEORAL, (NEORAL) 50 MG CAPSULE    Take 50 mg by mouth 2 (two) times daily.    DOXYCYCLINE (PERIOSTAT) 20 MG TABLET    Take 20 mg by mouth 2 (two) times daily.    DOXYCYCLINE MONOHYDRATE 100 MG TAB    Take 1 tablet by mouth 2 (two) times daily.    FLUOCINOLONE AND SHOWER CAP 0.01 % OIL    Apply topically every evening.    HYDROXYCHLOROQUINE (PLAQUENIL) 200 MG TABLET    Take 200 mg by mouth 2 (two) times daily.    LATANOPROST 0.005 % OPHTHALMIC SOLUTION    Place 1 drop into both eyes nightly.    LIALDA 1.2 GRAM TBEC    TAKE 4 TABLETS BY MOUTH DAILY WITH BREAKFAST.    MESALAMINE (CANASA) 1000 MG SUPP    PLACE 1 SUPPOSITORY (1,000 MG TOTAL) RECTALLY 2 (TWO) TIMES DAILY. (INSURANCE COVERS ONLY 1 DAILY)    TIMOLOL MALEATE 0.5% (TIMOPTIC) 0.5 % DROP    Place 1 drop into both eyes 2 (two) times daily.    TRETINOIN (RETIN-A) 0.05 % CREAM    Apply topically nightly.    UNABLE TO FIND    Take by mouth once daily. Nutrafol   Discontinued Medications    ACETAMINOPHEN (TYLENOL ORAL)    Take by mouth as needed.    CETIRIZINE (ZYRTEC) 10 MG TABLET    Take 10 mg by mouth once daily.    MINOCYCLINE (MINOCIN,DYNACIN)  100 MG CAPSULE    Take 100 mg by mouth once daily.    TIMOLOL OPHT    Apply 1 drop to eye every morning.       Physical Exam:  Vital Signs:  /83 (BP Location: Left arm, Patient Position: Sitting)   Pulse 65   Temp 97.9 °F (36.6 °C)   Wt 54.3 kg (119 lb 11.4 oz)   LMP 04/13/2018   SpO2 100%   BMI 19.92 kg/m²   Body mass index is 19.92 kg/m².    Physical Exam  Constitutional:       General: She is not in acute distress.     Appearance: She is well-developed.   Eyes:      Conjunctiva/sclera: Conjunctivae normal.      Pupils: Pupils are equal, round, and reactive to light.   Neck:      Thyroid: No thyromegaly.   Cardiovascular:      Rate and Rhythm: Normal rate and regular rhythm.      Heart sounds: Normal heart sounds. No murmur heard.  Pulmonary:      Effort: Pulmonary effort is normal.      Breath sounds: Normal breath sounds. No wheezing or rales.   Abdominal:      General: Bowel sounds are normal. There is no distension.      Palpations: Abdomen is soft. There is no mass.      Tenderness: There is no abdominal tenderness.   Musculoskeletal:         General: No tenderness. Normal range of motion.   Lymphadenopathy:      Cervical: No cervical adenopathy.   Skin:     General: Skin is warm and dry.      Findings: No erythema or rash.   Neurological:      Mental Status: She is alert and oriented to person, place, and time.   Psychiatric:         Behavior: Behavior normal.       Labs: reviewed and pertinent noted above    Assessment/Plan:  Raiza Cherry is a 53 y.o. female with left-sided ulcerative colitis. The following issues were addresssed:    1. Ulcerative rectosigmoiditis with rectal bleeding    2. Hair loss      1.  Ulcerative colitis:  Clinically she is doing well but with her markedly elevated stool calprotectin level I am concerned that she still has ongoing active disease in spite of 4.8 g daily of Lialda.  I would like to get her set up for a sigmoidoscopy with biopsies in the near future  to rule out infection.  If there is evidence of active disease which I suspect there will be, we will plan to proceed with other treatment options.  We discussed multiple treatment options today in detail including Zeposia, Entyvio, Stelara, and TNF inhibitors.  We discussed the risks and benefits of each of these medications in detail as well as the method of administration and expected time to onset of action.  She is going to think about these options while we arrange for the sigmoidoscopy.    2. Hair loss:  She continues to have hair loss and some nail issues that have been ongoing in spite of treatment with her dermatologist.  We discussed the fact that it may be that some of these issues related active IBD.  I would doubt that Lialda would cause these issues.          # IBD specific health maintenance:  Colon cancer surveillance:  Up-to-date    Annual:  - Eye exam:  Not applicable  - Skin exam (if on IMM/TNF):  Up-to-date  - reminded pt to use sunblock/hats/sunprotective clothing  - PAP (if immunosuppressed):  June 2021    DEXA:  Not applicable    Vitamin D:  Normal    Vaccines:    Influenza:  Up-to-date   Pneumovax:     PCV13:  Received    PSV23:  Up-to-date   HAV:  Discuss in the future   HBV:  Discuss in the   Tdap:  Needs to be updated   MMR:  Immune   VZV:  Immune   HZV:  Up-to-date   HPV:  Not applicable   Meningococcus:  Not applicable   COVID:  Completed series and booster      Follow up: Follow up in about 6 months (around 7/31/2023).    Thank you again for sending Raiza Cherry to see Dr. Shakir Breaux today at the [facility]. Please don't hesitate to contact Dr. Breaux if there are any questions regarding this evaluation, or if you have any other patients with inflammatory bowel disease for whom you would like a consultation. You can reach Dr. Breaux at [phone] or by email at [email or web address]    Bran Breaux MD  Department of  Gastroenterology  Inflammatory Bowel Disease

## 2023-01-31 NOTE — PROGRESS NOTES
IBD PATIENT INTAKE:    COVID symptoms in the last 7 days (runny nose, sore throat, congestion, cough, fever): N/A  PCP: Per Saab  If not PCP-  number given to establish [phone]: N/A    ALLERGIES REVIEWED:  Yes    CHIEF COMPLAINT:    Chief Complaint   Patient presents with    Ulcerative Colitis       VITAL SIGNS:  /83 (BP Location: Left arm, Patient Position: Sitting)   Pulse 65   Temp 97.9 °F (36.6 °C)   Wt 54.3 kg (119 lb 11.4 oz)   LMP 04/13/2018   SpO2 100%   BMI 19.92 kg/m²      Change in medical, surgical, family or social history: Yes breast implant removed 9/2022    IBD THERAPY (name, dose/frequency):  Lialda 4.8g Canasa  Last dose:  n/a    Next dose:  n/a  Infusion/Pharmacy: NA    NSAIDs (aspirin, ibuprofen-advil or motrin, naproxen-aleve, diclofenac-voltaren, BC powder, excedrin, goodies): No    Alternative/Complementary Medications (i.e. probiotics, turmeric, fish oil, aloe vera):      no  Name/dose:  n/a    Vitamins:   Vit D:  not sure dose      Vit B-12:  no   Folic Acid: no       Calcium: no     Iron:  no      MVI: no    Antibiotics (past 30 Days):  no  If yes   Indication: hair loss  Name of antibiotic: doxycycline   Completion date: currently    REVIEWED MEDICATION LIST RECONCILED INCLUDING ABOVE MEDS:  yes

## 2023-02-01 ENCOUNTER — TELEPHONE (OUTPATIENT)
Dept: ENDOSCOPY | Facility: HOSPITAL | Age: 53
End: 2023-02-01
Payer: COMMERCIAL

## 2023-02-01 NOTE — TELEPHONE ENCOUNTER
Called patient to schedule for a sigmoidoscopy. No answer. Left message for patient to call Endoscopy Scheduling. Phone number provided.

## 2023-02-15 ENCOUNTER — ANESTHESIA (OUTPATIENT)
Dept: ENDOSCOPY | Facility: HOSPITAL | Age: 53
End: 2023-02-15
Payer: COMMERCIAL

## 2023-02-15 ENCOUNTER — ANESTHESIA EVENT (OUTPATIENT)
Dept: ENDOSCOPY | Facility: HOSPITAL | Age: 53
End: 2023-02-15
Payer: COMMERCIAL

## 2023-02-15 ENCOUNTER — HOSPITAL ENCOUNTER (OUTPATIENT)
Facility: HOSPITAL | Age: 53
Discharge: HOME | End: 2023-02-15
Attending: INTERNAL MEDICINE | Admitting: INTERNAL MEDICINE
Payer: COMMERCIAL

## 2023-02-15 VITALS
BODY MASS INDEX: 19.49 KG/M2 | OXYGEN SATURATION: 100 % | WEIGHT: 117 LBS | RESPIRATION RATE: 18 BRPM | HEIGHT: 65 IN | DIASTOLIC BLOOD PRESSURE: 70 MMHG | HEART RATE: 51 BPM | TEMPERATURE: 98 F | SYSTOLIC BLOOD PRESSURE: 117 MMHG

## 2023-02-15 DIAGNOSIS — K51.311: Primary | ICD-10-CM

## 2023-02-15 PROCEDURE — 25000003 PHARM REV CODE 250: Performed by: NURSE ANESTHETIST, CERTIFIED REGISTERED

## 2023-02-15 PROCEDURE — 88305 TISSUE EXAM BY PATHOLOGIST: CPT | Mod: 26,,, | Performed by: STUDENT IN AN ORGANIZED HEALTH CARE EDUCATION/TRAINING PROGRAM

## 2023-02-15 PROCEDURE — 45331 SIGMOIDOSCOPY AND BIOPSY: CPT | Mod: ,,, | Performed by: INTERNAL MEDICINE

## 2023-02-15 PROCEDURE — 88305 TISSUE EXAM BY PATHOLOGIST: CPT | Performed by: STUDENT IN AN ORGANIZED HEALTH CARE EDUCATION/TRAINING PROGRAM

## 2023-02-15 PROCEDURE — 45331 SIGMOIDOSCOPY AND BIOPSY: CPT | Performed by: INTERNAL MEDICINE

## 2023-02-15 PROCEDURE — E9220 PRA ENDO ANESTHESIA: HCPCS | Mod: ,,, | Performed by: NURSE ANESTHETIST, CERTIFIED REGISTERED

## 2023-02-15 PROCEDURE — 37000009 HC ANESTHESIA EA ADD 15 MINS: Performed by: INTERNAL MEDICINE

## 2023-02-15 PROCEDURE — 88305 TISSUE EXAM BY PATHOLOGIST: ICD-10-PCS | Mod: 26,,, | Performed by: STUDENT IN AN ORGANIZED HEALTH CARE EDUCATION/TRAINING PROGRAM

## 2023-02-15 PROCEDURE — 27201012 HC FORCEPS, HOT/COLD, DISP: Performed by: INTERNAL MEDICINE

## 2023-02-15 PROCEDURE — 37000008 HC ANESTHESIA 1ST 15 MINUTES: Performed by: INTERNAL MEDICINE

## 2023-02-15 PROCEDURE — E9220 PRA ENDO ANESTHESIA: ICD-10-PCS | Mod: ,,, | Performed by: NURSE ANESTHETIST, CERTIFIED REGISTERED

## 2023-02-15 PROCEDURE — 63600175 PHARM REV CODE 636 W HCPCS: Performed by: NURSE ANESTHETIST, CERTIFIED REGISTERED

## 2023-02-15 PROCEDURE — 45331 PR SIGMOIDOSCOPY,BIOPSY: ICD-10-PCS | Mod: ,,, | Performed by: INTERNAL MEDICINE

## 2023-02-15 RX ORDER — PROPOFOL 10 MG/ML
VIAL (ML) INTRAVENOUS CONTINUOUS PRN
Status: DISCONTINUED | OUTPATIENT
Start: 2023-02-15 | End: 2023-02-15

## 2023-02-15 RX ORDER — SODIUM CHLORIDE 9 MG/ML
INJECTION, SOLUTION INTRAVENOUS CONTINUOUS
Status: DISCONTINUED | OUTPATIENT
Start: 2023-02-15 | End: 2023-02-15 | Stop reason: HOSPADM

## 2023-02-15 RX ORDER — LIDOCAINE HYDROCHLORIDE 20 MG/ML
INJECTION INTRAVENOUS
Status: DISCONTINUED | OUTPATIENT
Start: 2023-02-15 | End: 2023-02-15

## 2023-02-15 RX ORDER — PROPOFOL 10 MG/ML
VIAL (ML) INTRAVENOUS
Status: DISCONTINUED | OUTPATIENT
Start: 2023-02-15 | End: 2023-02-15

## 2023-02-15 RX ADMIN — PROPOFOL 100 MG: 10 INJECTION, EMULSION INTRAVENOUS at 08:02

## 2023-02-15 RX ADMIN — Medication 150 MCG/KG/MIN: at 08:02

## 2023-02-15 RX ADMIN — LIDOCAINE HYDROCHLORIDE 30 MG: 20 INJECTION INTRAVENOUS at 08:02

## 2023-02-15 RX ADMIN — SODIUM CHLORIDE: 0.9 INJECTION, SOLUTION INTRAVENOUS at 07:02

## 2023-02-15 NOTE — ANESTHESIA POSTPROCEDURE EVALUATION
Anesthesia Post Evaluation    Patient: Raiza Cherry    Procedure(s) Performed: Procedure(s) (LRB):  SIGMOIDOSCOPY-FLEXIBLE (N/A)    Final Anesthesia Type: general      Patient location during evaluation: GI PACU  Patient participation: Yes- Able to Participate  Level of consciousness: awake and alert and oriented  Post-procedure vital signs: reviewed and stable  Pain management: adequate  Airway patency: patent    PONV status at discharge: No PONV  Anesthetic complications: no      Cardiovascular status: hemodynamically stable  Respiratory status: unassisted, spontaneous ventilation and room air  Hydration status: euvolemic  Follow-up not needed.          Vitals Value Taken Time   /70 02/15/23 0855   Temp 36.5 °C (97.7 °F) 02/15/23 0823   Pulse 51 02/15/23 0855   Resp 18 02/15/23 0855   SpO2 100 % 02/15/23 0855         Event Time   Out of Recovery 09:01:58         Pain/Kourtney Score: Kourtney Score: 10 (2/15/2023  8:35 AM)

## 2023-02-15 NOTE — TRANSFER OF CARE
"Anesthesia Transfer of Care Note    Patient: Raiza Cherry    Procedure(s) Performed: Procedure(s) (LRB):  SIGMOIDOSCOPY-FLEXIBLE (N/A)    Patient location: GI    Anesthesia Type: general    Transport from OR: Transported from OR on room air with adequate spontaneous ventilation    Post pain: adequate analgesia    Post assessment: no apparent anesthetic complications    Post vital signs: stable    Level of consciousness: awake    Nausea/Vomiting: no nausea/vomiting    Complications: none    Transfer of care protocol was followed      Last vitals:   Visit Vitals  /60 (BP Location: Left arm, Patient Position: Lying)   Pulse 60   Temp 36.7 °C (98.1 °F) (Temporal)   Resp 16   Ht 5' 5" (1.651 m)   Wt 53.1 kg (117 lb)   LMP 04/13/2018   SpO2 100%   Breastfeeding No   BMI 19.47 kg/m²     "

## 2023-02-15 NOTE — PROVATION PATIENT INSTRUCTIONS
Discharge Summary/Instructions after an Endoscopic Procedure  Patient Name: Raiza Cherry  Patient MRN: [id number]  Patient YOB: 1970  Wednesday, February 15, 2023  Bran Breaux MD  Dear patient,  As a result of recent federal legislation (The Federal Cures Act), you may   receive lab or pathology results from your procedure in your [facility]   account before your physician is able to contact you. Your physician or   their representative will relay the results to you with their   recommendations at their soonest availability.  Thank you,  RESTRICTIONS:  During your procedure today, you received medications for sedation.  These   medications may affect your judgment, balance and coordination.  Therefore,   for 24 hours, you have the following restrictions:   - DO NOT drive a car, operate machinery, make legal/financial decisions,   sign important papers or drink alcohol.    ACTIVITY:  Today: no heavy lifting, straining or running due to procedural   sedation/anesthesia.  The following day: return to full activity including work.  DIET:  Eat and drink normally unless instructed otherwise.     TREATMENT FOR COMMON SIDE EFFECTS:  - Mild abdominal pain, nausea, belching, bloating or excessive gas:  rest,   eat lightly and use a heating pad.  - Sore Throat: treat with throat lozenges and/or gargle with warm salt   water.  - Because air was used during the procedure, expelling large amounts of air   from your rectum or belching is normal.  - If a bowel prep was taken, you may not have a bowel movement for 1-3 days.    This is normal.  SYMPTOMS TO WATCH FOR AND REPORT TO YOUR PHYSICIAN:  1. Abdominal pain or bloating, other than gas cramps.  2. Chest pain.  3. Back pain.  4. Signs of infection such as: chills or fever occurring within 24 hours   after the procedure.  5. Rectal bleeding, which would show as bright red, maroon, or black stools.   (A tablespoon of blood from the rectum is not serious, especially  if   hemorrhoids are present.)  6. Vomiting.  7. Weakness or dizziness.  GO DIRECTLY TO THE NEAREST EMERGENCY ROOM IF YOU HAVE ANY OF THE FOLLOWING:      Difficulty breathing              Chills and/or fever over 101 F   Persistent vomiting and/or vomiting blood   Severe abdominal pain   Severe chest pain   Black, tarry stools   Bleeding- more than one tablespoon   Any other symptom or condition that you feel may need urgent attention  Your doctor recommends these additional instructions:  If any biopsies were taken, your doctors clinic will contact you in 1 to 2   weeks with any results.  - Discharge patient to home.   - Resume previous diet today.   - Continue present medications.   - Await pathology results.   - Will discuss advancing therapy after biopsies results are available.  For questions, problems or results please call your physician - Bran Breaux MD at Work:  [phone].  [facility], EMERGENCY ROOM PHONE NUMBER: [phone]  IF A COMPLICATION OR EMERGENCY SITUATION ARISES AND YOU ARE UNABLE TO REACH   YOUR PHYSICIAN - GO DIRECTLY TO THE EMERGENCY ROOM.  Bran Breaux MD  2/15/2023 8:21:39 AM  This report has been verified and signed electronically.  Dear patient,  As a result of recent federal legislation (The Federal Cures Act), you may   receive lab or pathology results from your procedure in your [facility]   account before your physician is able to contact you. Your physician or   their representative will relay the results to you with their   recommendations at their soonest availability.  Thank you,  PROVATION

## 2023-02-15 NOTE — ANESTHESIA PREPROCEDURE EVALUATION
02/15/2023  Raiza Cherry is a 53 y.o., female.  Past Medical History:   Diagnosis Date    Abnormal Pap smear of cervix     LEEP 2016, 9-2017 LGSIL, colpo  mildly abnl cells,     Cataract     Chronic diarrhea     Glaucoma     Ulcerative colitis     proctitis     Past Surgical History:   Procedure Laterality Date    AUGMENTATION OF BREAST      implants were deflated by a doctor and will be removed 9/2022    BREAST SURGERY  2014, 2018    enlargement and lift of both breasts    BREAST SURGERY Bilateral 09/2022    implants removed    CATARACT EXTRACTION W/  INTRAOCULAR LENS IMPLANT Right 10/06/2021    DR.ELLEN BULLOCK    CATARACT EXTRACTION W/  INTRAOCULAR LENS IMPLANT Left 2013    DR.ELLEN BULLOCK    CERVICAL BIOPSY  W/ LOOP ELECTRODE EXCISION  02/2016    Moderate dysplasia completely excised margins clear and ECC negative    COLONOSCOPY N/A 01/19/2022    Procedure: COLONOSCOPY;  Surgeon: Bran Breaux MD;  Location: [address]);  Service: Endoscopy;  Laterality: N/A;  fully vaccinated 3/27/21, instructions sent to [facility]      COVID test at [place] on 1/16-GT    MASTOPEXY Bilateral 09/27/2022    Procedure: MASTOPEXY FULL WITH IMPLANT BAG REMOVAL;  Surgeon: Armen Reynolds Jr., MD;  Location: [facility];  Service: Plastics;  Laterality: Bilateral;  2HRS         Pre-op Assessment    I have reviewed the Patient Summary Reports.     I have reviewed the Nursing Notes. I have reviewed the NPO Status.   I have reviewed the Medications.     Review of Systems  Anesthesia Hx:  No problems with previous Anesthesia  Denies Family Hx of Anesthesia complications.   Denies Personal Hx of Anesthesia complications.   Hematology/Oncology:  Hematology Normal   Oncology Normal     EENT/Dental:EENT/Dental Normal   Cardiovascular:  Cardiovascular Normal     Pulmonary:  Pulmonary  Normal    Renal/:  Renal/ Normal     Hepatic/GI:   Bowel Prep. PUD, Ulcerative collitis   Musculoskeletal:  Musculoskeletal Normal    Neurological:  Neurology Normal    Endocrine:  Endocrine Normal    Dermatological:  Skin Normal    Psych:  Psychiatric Normal           Physical Exam  General: Well nourished    Airway:  Mallampati: II   Mouth Opening: Normal  TM Distance: Normal  Tongue: Normal  Neck ROM: Normal ROM    Dental:  Intact        Anesthesia Plan  Type of Anesthesia, risks & benefits discussed:    Anesthesia Type: Gen Natural Airway  Intra-op Monitoring Plan: Standard ASA Monitors  Informed Consent: Informed consent signed with the Patient and all parties understand the risks and agree with anesthesia plan.  All questions answered.   ASA Score: 2  Day of Surgery Review of History & Physical: H&P Update referred to the surgeon/provider.I have interviewed and examined the patient. I have reviewed the patient's H&P dated: There are no significant changes.     Ready For Surgery From Anesthesia Perspective.     .

## 2023-02-24 LAB
COMMENT: NORMAL
FINAL PATHOLOGIC DIAGNOSIS: NORMAL
GROSS: NORMAL
Lab: NORMAL

## 2023-02-27 ENCOUNTER — PATIENT MESSAGE (OUTPATIENT)
Dept: OBSTETRICS AND GYNECOLOGY | Facility: CLINIC | Age: 53
End: 2023-02-27
Payer: COMMERCIAL

## 2023-02-28 ENCOUNTER — PATIENT MESSAGE (OUTPATIENT)
Dept: GASTROENTEROLOGY | Facility: CLINIC | Age: 53
End: 2023-02-28
Payer: COMMERCIAL

## 2023-02-28 DIAGNOSIS — K51.311 ULCERATIVE RECTOSIGMOIDITIS WITH RECTAL BLEEDING: Primary | ICD-10-CM

## 2023-03-01 ENCOUNTER — PATIENT MESSAGE (OUTPATIENT)
Dept: GASTROENTEROLOGY | Facility: CLINIC | Age: 53
End: 2023-03-01
Payer: COMMERCIAL

## 2023-03-02 ENCOUNTER — HOSPITAL ENCOUNTER (OUTPATIENT)
Dept: CARDIOLOGY | Facility: CLINIC | Age: 53
Discharge: HOME | End: 2023-03-02
Payer: COMMERCIAL

## 2023-03-02 ENCOUNTER — LAB VISIT (OUTPATIENT)
Dept: LAB | Facility: HOSPITAL | Age: 53
End: 2023-03-02
Attending: INTERNAL MEDICINE
Payer: COMMERCIAL

## 2023-03-02 DIAGNOSIS — K51.311 ULCERATIVE RECTOSIGMOIDITIS WITH RECTAL BLEEDING: ICD-10-CM

## 2023-03-02 LAB
25(OH)D3+25(OH)D2 SERPL-MCNC: 62 NG/ML (ref 30–96)
ALBUMIN SERPL BCP-MCNC: 4.4 G/DL (ref 3.5–5.2)
ALP SERPL-CCNC: 89 U/L (ref 55–135)
ALT SERPL W/O P-5'-P-CCNC: 14 U/L (ref 10–44)
ANION GAP SERPL CALC-SCNC: 7 MMOL/L (ref 8–16)
AST SERPL-CCNC: 17 U/L (ref 10–40)
BASOPHILS # BLD AUTO: 0.06 K/UL (ref 0–0.2)
BASOPHILS NFR BLD: 0.8 % (ref 0–1.9)
BILIRUB SERPL-MCNC: 0.5 MG/DL (ref 0.1–1)
BUN SERPL-MCNC: 15 MG/DL (ref 6–20)
CALCIUM SERPL-MCNC: 9.9 MG/DL (ref 8.7–10.5)
CHLORIDE SERPL-SCNC: 106 MMOL/L (ref 95–110)
CO2 SERPL-SCNC: 28 MMOL/L (ref 23–29)
CREAT SERPL-MCNC: 0.7 MG/DL (ref 0.5–1.4)
CRP SERPL-MCNC: 0.6 MG/L (ref 0–8.2)
DIFFERENTIAL METHOD: ABNORMAL
EOSINOPHIL # BLD AUTO: 0.1 K/UL (ref 0–0.5)
EOSINOPHIL NFR BLD: 1.4 % (ref 0–8)
ERYTHROCYTE [DISTWIDTH] IN BLOOD BY AUTOMATED COUNT: 12.5 % (ref 11.5–14.5)
EST. GFR  (NO RACE VARIABLE): >60 ML/MIN/1.73 M^2
GLUCOSE SERPL-MCNC: 101 MG/DL (ref 70–110)
HBV CORE AB SERPL QL IA: NORMAL
HBV SURFACE AG SERPL QL IA: NORMAL
HCT VFR BLD AUTO: 42.5 % (ref 37–48.5)
HGB BLD-MCNC: 13.5 G/DL (ref 12–16)
IMM GRANULOCYTES # BLD AUTO: 0.03 K/UL (ref 0–0.04)
IMM GRANULOCYTES NFR BLD AUTO: 0.4 % (ref 0–0.5)
LYMPHOCYTES # BLD AUTO: 2.1 K/UL (ref 1–4.8)
LYMPHOCYTES NFR BLD: 27.2 % (ref 18–48)
MCH RBC QN AUTO: 30.7 PG (ref 27–31)
MCHC RBC AUTO-ENTMCNC: 31.8 G/DL (ref 32–36)
MCV RBC AUTO: 97 FL (ref 82–98)
MONOCYTES # BLD AUTO: 0.4 K/UL (ref 0.3–1)
MONOCYTES NFR BLD: 5.1 % (ref 4–15)
NEUTROPHILS # BLD AUTO: 5 K/UL (ref 1.8–7.7)
NEUTROPHILS NFR BLD: 65.1 % (ref 38–73)
NRBC BLD-RTO: 0 /100 WBC
PLATELET # BLD AUTO: 334 K/UL (ref 150–450)
PMV BLD AUTO: 9.3 FL (ref 9.2–12.9)
POTASSIUM SERPL-SCNC: 4 MMOL/L (ref 3.5–5.1)
PROT SERPL-MCNC: 8 G/DL (ref 6–8.4)
RBC # BLD AUTO: 4.4 M/UL (ref 4–5.4)
SODIUM SERPL-SCNC: 141 MMOL/L (ref 136–145)
WBC # BLD AUTO: 7.65 K/UL (ref 3.9–12.7)

## 2023-03-02 PROCEDURE — 87340 HEPATITIS B SURFACE AG IA: CPT | Performed by: INTERNAL MEDICINE

## 2023-03-02 PROCEDURE — 36415 COLL VENOUS BLD VENIPUNCTURE: CPT | Performed by: INTERNAL MEDICINE

## 2023-03-02 PROCEDURE — 93010 EKG 12-LEAD: ICD-10-PCS | Mod: S$GLB,,, | Performed by: INTERNAL MEDICINE

## 2023-03-02 PROCEDURE — 93005 ELECTROCARDIOGRAM TRACING: CPT | Mod: S$GLB,,, | Performed by: INTERNAL MEDICINE

## 2023-03-02 PROCEDURE — 93005 EKG 12-LEAD: ICD-10-PCS | Mod: S$GLB,,, | Performed by: INTERNAL MEDICINE

## 2023-03-02 PROCEDURE — 82306 VITAMIN D 25 HYDROXY: CPT | Performed by: INTERNAL MEDICINE

## 2023-03-02 PROCEDURE — 93010 ELECTROCARDIOGRAM REPORT: CPT | Mod: S$GLB,,, | Performed by: INTERNAL MEDICINE

## 2023-03-02 PROCEDURE — 86704 HEP B CORE ANTIBODY TOTAL: CPT | Performed by: INTERNAL MEDICINE

## 2023-03-02 PROCEDURE — 85025 COMPLETE CBC W/AUTO DIFF WBC: CPT | Performed by: INTERNAL MEDICINE

## 2023-03-02 PROCEDURE — 80053 COMPREHEN METABOLIC PANEL: CPT | Performed by: INTERNAL MEDICINE

## 2023-03-02 PROCEDURE — 86140 C-REACTIVE PROTEIN: CPT | Performed by: INTERNAL MEDICINE

## 2023-03-03 ENCOUNTER — TELEPHONE (OUTPATIENT)
Dept: GASTROENTEROLOGY | Facility: HOSPITAL | Age: 53
End: 2023-03-03
Payer: COMMERCIAL

## 2023-03-03 ENCOUNTER — TELEPHONE (OUTPATIENT)
Dept: GASTROENTEROLOGY | Facility: CLINIC | Age: 53
End: 2023-03-03
Payer: COMMERCIAL

## 2023-03-03 DIAGNOSIS — I44.7 LEFT BUNDLE BRANCH BLOCK: ICD-10-CM

## 2023-03-03 DIAGNOSIS — K51.311 ULCERATIVE RECTOSIGMOIDITIS WITH RECTAL BLEEDING: Primary | ICD-10-CM

## 2023-03-03 NOTE — TELEPHONE ENCOUNTER
I spoke with the patient and discussed her EKG findings.  She is not having any chest pain.  She does report that in the last couple of weeks she is been having some palpitations and fluctuating blood pressure issues.  I reviewed her rhythm strips from September of 2022 and that also appeared to show a conduction delay.  Based on this I did recommend that we not start Zeposia and that we consider other treatment options.  We discussed Entyvio and Stelara today.  She does not like the idea of getting IV infusions regularly and would prefer a medication she can give at home so I think Stelara might be a good option for her.  We will refer her to Cardiology for evaluation of the left bundle branch block and will go ahead and get a T spot on her and follow-up after those results are back.

## 2023-03-03 NOTE — TELEPHONE ENCOUNTER
Dr. Breaux  had called her and will have him  call back  ----- Message from Aleyda Boyer sent at 3/3/2023 12:55 PM CST -----  Regarding: pt advice  Contact: self @[phone]  Pt returning a missed call back. Pls call.

## 2023-03-06 ENCOUNTER — TELEPHONE (OUTPATIENT)
Dept: GASTROENTEROLOGY | Facility: CLINIC | Age: 53
End: 2023-03-06
Payer: COMMERCIAL

## 2023-03-06 DIAGNOSIS — K51.311 ULCERATIVE RECTOSIGMOIDITIS WITH RECTAL BLEEDING: Primary | ICD-10-CM

## 2023-03-06 RX ORDER — ESTRADIOL/NORETHINDRONE ACETATE TRANSDERMAL SYSTEM .05; .25 MG/D; MG/D
PATCH, EXTENDED RELEASE TRANSDERMAL
Qty: 4 PATCH | Refills: 11 | Status: SHIPPED | OUTPATIENT
Start: 2023-03-06 | End: 2023-06-01

## 2023-03-08 NOTE — TELEPHONE ENCOUNTER
We will have to see how she does. With hair it mauy take 3-6 months. Also rec Nutrafol.   Is she on oral minoxidil or topical?  What did Dr cordoba suggest?

## 2023-03-14 ENCOUNTER — TELEPHONE (OUTPATIENT)
Dept: GASTROENTEROLOGY | Facility: CLINIC | Age: 53
End: 2023-03-14
Payer: COMMERCIAL

## 2023-03-14 ENCOUNTER — OFFICE VISIT (OUTPATIENT)
Dept: CARDIOLOGY | Facility: CLINIC | Age: 53
End: 2023-03-14
Payer: COMMERCIAL

## 2023-03-14 ENCOUNTER — LAB VISIT (OUTPATIENT)
Dept: LAB | Facility: HOSPITAL | Age: 53
End: 2023-03-14
Payer: COMMERCIAL

## 2023-03-14 VITALS
WEIGHT: 120.81 LBS | SYSTOLIC BLOOD PRESSURE: 138 MMHG | HEART RATE: 59 BPM | HEIGHT: 65 IN | BODY MASS INDEX: 20.13 KG/M2 | DIASTOLIC BLOOD PRESSURE: 79 MMHG

## 2023-03-14 DIAGNOSIS — K51.311 ULCERATIVE RECTOSIGMOIDITIS WITH RECTAL BLEEDING: ICD-10-CM

## 2023-03-14 DIAGNOSIS — I44.7 LEFT BUNDLE BRANCH BLOCK: ICD-10-CM

## 2023-03-14 DIAGNOSIS — K51.311 ULCERATIVE RECTOSIGMOIDITIS WITH RECTAL BLEEDING: Primary | ICD-10-CM

## 2023-03-14 PROCEDURE — 99999 PR PBB SHADOW E&M-EST. PATIENT-LVL IV: CPT | Mod: PBBFAC,,, | Performed by: INTERNAL MEDICINE

## 2023-03-14 PROCEDURE — 86480 TB TEST CELL IMMUN MEASURE: CPT | Performed by: INTERNAL MEDICINE

## 2023-03-14 PROCEDURE — 99204 OFFICE O/P NEW MOD 45 MIN: CPT | Mod: S$GLB,,, | Performed by: INTERNAL MEDICINE

## 2023-03-14 PROCEDURE — 99999 PR PBB SHADOW E&M-EST. PATIENT-LVL IV: ICD-10-PCS | Mod: PBBFAC,,, | Performed by: INTERNAL MEDICINE

## 2023-03-14 PROCEDURE — 3078F DIAST BP <80 MM HG: CPT | Mod: CPTII,S$GLB,, | Performed by: INTERNAL MEDICINE

## 2023-03-14 PROCEDURE — 3008F BODY MASS INDEX DOCD: CPT | Mod: CPTII,S$GLB,, | Performed by: INTERNAL MEDICINE

## 2023-03-14 PROCEDURE — 3078F PR MOST RECENT DIASTOLIC BLOOD PRESSURE < 80 MM HG: ICD-10-PCS | Mod: CPTII,S$GLB,, | Performed by: INTERNAL MEDICINE

## 2023-03-14 PROCEDURE — 3075F PR MOST RECENT SYSTOLIC BLOOD PRESS GE 130-139MM HG: ICD-10-PCS | Mod: CPTII,S$GLB,, | Performed by: INTERNAL MEDICINE

## 2023-03-14 PROCEDURE — 1159F PR MEDICATION LIST DOCUMENTED IN MEDICAL RECORD: ICD-10-PCS | Mod: CPTII,S$GLB,, | Performed by: INTERNAL MEDICINE

## 2023-03-14 PROCEDURE — 1159F MED LIST DOCD IN RCRD: CPT | Mod: CPTII,S$GLB,, | Performed by: INTERNAL MEDICINE

## 2023-03-14 PROCEDURE — 99204 PR OFFICE/OUTPT VISIT, NEW, LEVL IV, 45-59 MIN: ICD-10-PCS | Mod: S$GLB,,, | Performed by: INTERNAL MEDICINE

## 2023-03-14 PROCEDURE — 1160F PR REVIEW ALL MEDS BY PRESCRIBER/CLIN PHARMACIST DOCUMENTED: ICD-10-PCS | Mod: CPTII,S$GLB,, | Performed by: INTERNAL MEDICINE

## 2023-03-14 PROCEDURE — 1160F RVW MEDS BY RX/DR IN RCRD: CPT | Mod: CPTII,S$GLB,, | Performed by: INTERNAL MEDICINE

## 2023-03-14 PROCEDURE — 3008F PR BODY MASS INDEX (BMI) DOCUMENTED: ICD-10-PCS | Mod: CPTII,S$GLB,, | Performed by: INTERNAL MEDICINE

## 2023-03-14 PROCEDURE — 3075F SYST BP GE 130 - 139MM HG: CPT | Mod: CPTII,S$GLB,, | Performed by: INTERNAL MEDICINE

## 2023-03-14 RX ORDER — PREDNISONE 5 MG/1
TABLET ORAL
COMMUNITY
Start: 2023-03-04 | End: 2023-11-02

## 2023-03-14 NOTE — TELEPHONE ENCOUNTER
----- Message from Meme Henry sent at 3/14/2023 12:43 PM CDT -----  There was an issue with the QSPOT test ordered  03/10/2023.  The specimen exceeded stability.  The ordered has been cancelled and will need to be reordered and recollected.  If there are any questions please call the sendout laboratory. Anyone in the sendout lab will be able to assist you.

## 2023-03-14 NOTE — PROGRESS NOTES
HISTORY:    53-year-old female with a history of ulcerative colitis and glaucoma presenting for initial evaluation by me after screening ECG demonstrated a left bundle branch block.    The patient denies any symptoms of chest pain, shortness of breath, or dyspnea on exertion.    Excellent activity levels. Works full time and works out most days/week. Eats healthy.    The patient denies any previous history of myocardial infarction, coronary artery disease, peripheral arterial disease, stroke, congestive heart failure, or cardiomyopathy.      PHYSICAL EXAM:    Vitals:    03/14/23 1307   BP: 138/79   Pulse: (!) 59       NAD, A+Ox3.  No jvd, no bruit.  RRR nml s1,s2. No murmurs.  CTA B no wheezes or crackles.  No edema.    LABS/STUDIES (imaging reviewed during clinic visit):    March 2023 hemoglobin 13.5.  Creatinine 0.7 with a BUN of 15.  Albumin 4.4.  2022 TSH normal.  ECG March 2023 demonstrates sinus rhythm with a left bundle branch block.    ASSESSMENT & PLAN:    1. Left bundle branch block        Orders Placed This Encounter    Echo      Pt with asymptomatic LBBB. Check a TTE, if nml expectant management.    Follow-up prn.      Aicha Nguyen MD

## 2023-03-15 ENCOUNTER — PATIENT MESSAGE (OUTPATIENT)
Dept: GASTROENTEROLOGY | Facility: CLINIC | Age: 53
End: 2023-03-15
Payer: COMMERCIAL

## 2023-03-15 LAB
GAMMA INTERFERON BACKGROUND BLD IA-ACNC: 0.07 IU/ML
M TB IFN-G CD4+ BCKGRND COR BLD-ACNC: -0.02 IU/ML
MITOGEN IGNF BCKGRD COR BLD-ACNC: 9.93 IU/ML
TB GOLD PLUS: NEGATIVE
TB2 - NIL: -0.03 IU/ML

## 2023-04-14 ENCOUNTER — HOSPITAL ENCOUNTER (OUTPATIENT)
Dept: CARDIOLOGY | Facility: HOSPITAL | Age: 53
Discharge: HOME | End: 2023-04-14
Attending: INTERNAL MEDICINE
Payer: COMMERCIAL

## 2023-04-14 VITALS
DIASTOLIC BLOOD PRESSURE: 89 MMHG | WEIGHT: 120 LBS | BODY MASS INDEX: 19.99 KG/M2 | HEART RATE: 64 BPM | HEIGHT: 65 IN | SYSTOLIC BLOOD PRESSURE: 149 MMHG

## 2023-04-14 DIAGNOSIS — I44.7 LEFT BUNDLE BRANCH BLOCK: ICD-10-CM

## 2023-04-14 LAB
ASCENDING AORTA: 2.68 CM
AV INDEX (PROSTH): 0.86
AV MEAN GRADIENT: 4 MMHG
AV PEAK GRADIENT: 7 MMHG
AV VALVE AREA: 2.64 CM2
AV VELOCITY RATIO: 0.82
BSA FOR ECHO PROCEDURE: 1.58 M2
CV ECHO LV RWT: 0.3 CM
DOP CALC AO PEAK VEL: 1.34 M/S
DOP CALC AO VTI: 29.36 CM
DOP CALC LVOT AREA: 3.1 CM2
DOP CALC LVOT DIAMETER: 1.98 CM
DOP CALC LVOT PEAK VEL: 1.1 M/S
DOP CALC LVOT STROKE VOLUME: 77.37 CM3
DOP CALCLVOT PEAK VEL VTI: 25.14 CM
E WAVE DECELERATION TIME: 213.3 MSEC
E/A RATIO: 1.58
E/E' RATIO: 8.78 M/S
ECHO LV POSTERIOR WALL: 0.7 CM (ref 0.6–1.1)
FRACTIONAL SHORTENING: 36 % (ref 28–44)
INTERVENTRICULAR SEPTUM: 0.77 CM (ref 0.6–1.1)
LA MAJOR: 4.28 CM
LA MINOR: 3.69 CM
LA WIDTH: 3.54 CM
LEFT ATRIUM SIZE: 3.39 CM
LEFT ATRIUM VOLUME INDEX MOD: 22.7 ML/M2
LEFT ATRIUM VOLUME INDEX: 25.4 ML/M2
LEFT ATRIUM VOLUME MOD: 36.05 CM3
LEFT ATRIUM VOLUME: 40.43 CM3
LEFT INTERNAL DIMENSION IN SYSTOLE: 3.03 CM (ref 2.1–4)
LEFT VENTRICLE DIASTOLIC VOLUME INDEX: 65.52 ML/M2
LEFT VENTRICLE DIASTOLIC VOLUME: 104.17 ML
LEFT VENTRICLE MASS INDEX: 70 G/M2
LEFT VENTRICLE SYSTOLIC VOLUME INDEX: 22.6 ML/M2
LEFT VENTRICLE SYSTOLIC VOLUME: 35.87 ML
LEFT VENTRICULAR INTERNAL DIMENSION IN DIASTOLE: 4.74 CM (ref 3.5–6)
LEFT VENTRICULAR MASS: 111.24 G
LV LATERAL E/E' RATIO: 7.18 M/S
LV SEPTAL E/E' RATIO: 11.29 M/S
MV A" WAVE DURATION": 12.18 MSEC
MV PEAK A VEL: 0.5 M/S
MV PEAK E VEL: 0.79 M/S
PISA TR MAX VEL: 2.2 M/S
PULM VEIN S/D RATIO: 1.04
PV PEAK D VEL: 0.51 M/S
PV PEAK S VEL: 0.53 M/S
QEF: 53 %
RA MAJOR: 3.21 CM
RA PRESSURE: 3 MMHG
RA WIDTH: 3.09 CM
RIGHT VENTRICULAR END-DIASTOLIC DIMENSION: 2.29 CM
SINUS: 2.34 CM
STJ: 2.54 CM
TDI LATERAL: 0.11 M/S
TDI SEPTAL: 0.07 M/S
TDI: 0.09 M/S
TR MAX PG: 19 MMHG
TV REST PULMONARY ARTERY PRESSURE: 22 MMHG

## 2023-04-14 PROCEDURE — 93306 TTE W/DOPPLER COMPLETE: CPT

## 2023-04-14 PROCEDURE — 93306 TTE W/DOPPLER COMPLETE: CPT | Mod: 26,,, | Performed by: INTERNAL MEDICINE

## 2023-04-14 PROCEDURE — 93306 ECHO (CUPID ONLY): ICD-10-PCS | Mod: 26,,, | Performed by: INTERNAL MEDICINE

## 2023-04-17 ENCOUNTER — CLINICAL SUPPORT (OUTPATIENT)
Dept: OTHER | Facility: CLINIC | Age: 53
End: 2023-04-17
Payer: COMMERCIAL

## 2023-04-17 DIAGNOSIS — Z00.8 ENCOUNTER FOR OTHER GENERAL EXAMINATION: ICD-10-CM

## 2023-04-18 ENCOUNTER — PATIENT MESSAGE (OUTPATIENT)
Dept: OBSTETRICS AND GYNECOLOGY | Facility: CLINIC | Age: 53
End: 2023-04-18
Payer: COMMERCIAL

## 2023-04-18 VITALS
SYSTOLIC BLOOD PRESSURE: 138 MMHG | DIASTOLIC BLOOD PRESSURE: 84 MMHG | HEIGHT: 64 IN | WEIGHT: 118 LBS | BODY MASS INDEX: 20.14 KG/M2

## 2023-04-18 LAB
GLUCOSE SERPL-MCNC: 82 MG/DL (ref 60–140)
HDLC SERPL-MCNC: 79 MG/DL
POC CHOLESTEROL, LDL (DOCK): 168 MG/DL
POC CHOLESTEROL, TOTAL: 256 MG/DL
TRIGL SERPL-MCNC: 58 MG/DL

## 2023-04-19 ENCOUNTER — TELEPHONE (OUTPATIENT)
Dept: CARDIOLOGY | Facility: CLINIC | Age: 53
End: 2023-04-19
Payer: COMMERCIAL

## 2023-04-19 NOTE — TELEPHONE ENCOUNTER
Spoke to pt about TTE result. Nml LV function with a LBBB. LV size borderline. Pt completely asymptomatic with excellent exercise capacity. She will start a home BP log and RTC in 3 months. If Bps elevated may consider betablocker vs aceI.

## 2023-05-15 ENCOUNTER — PATIENT MESSAGE (OUTPATIENT)
Dept: OBSTETRICS AND GYNECOLOGY | Facility: CLINIC | Age: 53
End: 2023-05-15
Payer: COMMERCIAL

## 2023-05-15 NOTE — TELEPHONE ENCOUNTER
Can you please respond - rec to stay on combipatch - that is not causing hair loss and she needs to cont her Estrogen as that only helps the hair and prevents hormonal hair loss for lack of estrogen but that is not the only reason to lose hair- talk to derm   I would not stop HRT  Cont with specialist and may need to consider PRP but that is out of my league of recommendations- really a derm issue

## 2023-05-30 ENCOUNTER — PATIENT MESSAGE (OUTPATIENT)
Dept: OBSTETRICS AND GYNECOLOGY | Facility: CLINIC | Age: 53
End: 2023-05-30
Payer: COMMERCIAL

## 2023-06-01 ENCOUNTER — PATIENT MESSAGE (OUTPATIENT)
Dept: OBSTETRICS AND GYNECOLOGY | Facility: CLINIC | Age: 53
End: 2023-06-01
Payer: COMMERCIAL

## 2023-06-01 DIAGNOSIS — N95.1 MENOPAUSAL HOT FLUSHES: Primary | ICD-10-CM

## 2023-06-01 RX ORDER — ESTRADIOL/NORETHINDRONE ACETATE TRANSDERMAL SYSTEM .05; .14 MG/D; MG/D
PATCH, EXTENDED RELEASE TRANSDERMAL
Qty: 8 PATCH | Refills: 11 | Status: SHIPPED | OUTPATIENT
Start: 2023-06-01 | End: 2023-11-02 | Stop reason: SDUPTHER

## 2023-07-18 DIAGNOSIS — K51.30 ULCERATIVE RECTOSIGMOIDITIS WITHOUT COMPLICATION: ICD-10-CM

## 2023-07-18 NOTE — TELEPHONE ENCOUNTER
IBD medications Lialda 4.8g Canasa    Refill request for LIALDA 1.2g    Allergies reviewed Yes    Drug Monitoring labs/frequency for all IBD meds:  CBC, CMP - Yearly    Lab Results   Component Value Date    HEPBSAG Non-reactive 03/02/2023    HEPBCAB Non-reactive 03/02/2023     Lab Results   Component Value Date    TBGOLDPLUS Negative 03/14/2023     Lab Results   Component Value Date    CYBGAYLE81VP 62 03/02/2023    WCHAIOBT85 >2000 (H) 07/29/2022     Lab Results   Component Value Date    WBC 7.65 03/02/2023    HGB 13.5 03/02/2023    HCT 42.5 03/02/2023    MCV 97 03/02/2023     03/02/2023     Lab Results   Component Value Date    CREATININE 0.7 03/02/2023    ALBUMIN 4.4 03/02/2023    BILITOT 0.5 03/02/2023    ALKPHOS 89 03/02/2023    AST 17 03/02/2023    ALT 14 03/02/2023       Lab due date (mo/yr):  03/2024    Labs scheduled: No    Next appt: 10/03/2023    RX refill sent to provider for amount until next labs: Yes

## 2023-07-19 ENCOUNTER — PATIENT MESSAGE (OUTPATIENT)
Dept: GASTROENTEROLOGY | Facility: CLINIC | Age: 53
End: 2023-07-19
Payer: COMMERCIAL

## 2023-07-19 ENCOUNTER — PATIENT MESSAGE (OUTPATIENT)
Dept: OBSTETRICS AND GYNECOLOGY | Facility: CLINIC | Age: 53
End: 2023-07-19
Payer: COMMERCIAL

## 2023-07-19 DIAGNOSIS — Z12.31 BREAST CANCER SCREENING BY MAMMOGRAM: Primary | ICD-10-CM

## 2023-07-19 RX ORDER — MESALAMINE 1.2 G/1
TABLET, DELAYED RELEASE ORAL
Qty: 120 TABLET | Refills: 5 | Status: SHIPPED | OUTPATIENT
Start: 2023-07-19 | End: 2024-02-26

## 2023-07-20 ENCOUNTER — PATIENT MESSAGE (OUTPATIENT)
Dept: GASTROENTEROLOGY | Facility: CLINIC | Age: 53
End: 2023-07-20
Payer: COMMERCIAL

## 2023-08-04 ENCOUNTER — APPOINTMENT (OUTPATIENT)
Dept: RADIOLOGY | Facility: OTHER | Age: 53
End: 2023-08-04
Attending: OBSTETRICS & GYNECOLOGY
Payer: COMMERCIAL

## 2023-08-04 VITALS — HEIGHT: 64 IN | BODY MASS INDEX: 20.14 KG/M2 | WEIGHT: 117.94 LBS

## 2023-08-04 DIAGNOSIS — Z12.31 BREAST CANCER SCREENING BY MAMMOGRAM: ICD-10-CM

## 2023-08-04 PROCEDURE — 77067 SCR MAMMO BI INCL CAD: CPT | Mod: TC,PN

## 2023-08-04 PROCEDURE — 77063 MAMMO DIGITAL SCREENING BILAT WITH TOMO: ICD-10-PCS | Mod: 26,,, | Performed by: RADIOLOGY

## 2023-08-04 PROCEDURE — 77063 BREAST TOMOSYNTHESIS BI: CPT | Mod: 26,,, | Performed by: RADIOLOGY

## 2023-08-04 PROCEDURE — 77067 MAMMO DIGITAL SCREENING BILAT WITH TOMO: ICD-10-PCS | Mod: 26,,, | Performed by: RADIOLOGY

## 2023-08-04 PROCEDURE — 77067 SCR MAMMO BI INCL CAD: CPT | Mod: 26,,, | Performed by: RADIOLOGY

## 2023-10-03 ENCOUNTER — CLINICAL SUPPORT (OUTPATIENT)
Dept: GASTROENTEROLOGY | Facility: CLINIC | Age: 53
End: 2023-10-03
Payer: COMMERCIAL

## 2023-10-03 ENCOUNTER — OFFICE VISIT (OUTPATIENT)
Dept: GASTROENTEROLOGY | Facility: CLINIC | Age: 53
End: 2023-10-03
Payer: COMMERCIAL

## 2023-10-03 VITALS
OXYGEN SATURATION: 100 % | BODY MASS INDEX: 19.94 KG/M2 | DIASTOLIC BLOOD PRESSURE: 70 MMHG | WEIGHT: 119.69 LBS | HEIGHT: 65 IN | HEART RATE: 67 BPM | SYSTOLIC BLOOD PRESSURE: 126 MMHG

## 2023-10-03 DIAGNOSIS — K51.311 ULCERATIVE RECTOSIGMOIDITIS WITH RECTAL BLEEDING: Primary | ICD-10-CM

## 2023-10-03 DIAGNOSIS — L65.9 HAIR LOSS: ICD-10-CM

## 2023-10-03 PROCEDURE — 1160F PR REVIEW ALL MEDS BY PRESCRIBER/CLIN PHARMACIST DOCUMENTED: ICD-10-PCS | Mod: CPTII,S$GLB,, | Performed by: INTERNAL MEDICINE

## 2023-10-03 PROCEDURE — 1160F RVW MEDS BY RX/DR IN RCRD: CPT | Mod: CPTII,S$GLB,, | Performed by: INTERNAL MEDICINE

## 2023-10-03 PROCEDURE — 99214 OFFICE O/P EST MOD 30 MIN: CPT | Mod: S$GLB,,, | Performed by: INTERNAL MEDICINE

## 2023-10-03 PROCEDURE — 3078F PR MOST RECENT DIASTOLIC BLOOD PRESSURE < 80 MM HG: ICD-10-PCS | Mod: CPTII,S$GLB,, | Performed by: INTERNAL MEDICINE

## 2023-10-03 PROCEDURE — 3008F BODY MASS INDEX DOCD: CPT | Mod: CPTII,S$GLB,, | Performed by: INTERNAL MEDICINE

## 2023-10-03 PROCEDURE — 3008F PR BODY MASS INDEX (BMI) DOCUMENTED: ICD-10-PCS | Mod: CPTII,S$GLB,, | Performed by: INTERNAL MEDICINE

## 2023-10-03 PROCEDURE — 99214 PR OFFICE/OUTPT VISIT, EST, LEVL IV, 30-39 MIN: ICD-10-PCS | Mod: S$GLB,,, | Performed by: INTERNAL MEDICINE

## 2023-10-03 PROCEDURE — 3074F SYST BP LT 130 MM HG: CPT | Mod: CPTII,S$GLB,, | Performed by: INTERNAL MEDICINE

## 2023-10-03 PROCEDURE — 1159F PR MEDICATION LIST DOCUMENTED IN MEDICAL RECORD: ICD-10-PCS | Mod: CPTII,S$GLB,, | Performed by: INTERNAL MEDICINE

## 2023-10-03 PROCEDURE — 1159F MED LIST DOCD IN RCRD: CPT | Mod: CPTII,S$GLB,, | Performed by: INTERNAL MEDICINE

## 2023-10-03 PROCEDURE — 3078F DIAST BP <80 MM HG: CPT | Mod: CPTII,S$GLB,, | Performed by: INTERNAL MEDICINE

## 2023-10-03 PROCEDURE — 3074F PR MOST RECENT SYSTOLIC BLOOD PRESSURE < 130 MM HG: ICD-10-PCS | Mod: CPTII,S$GLB,, | Performed by: INTERNAL MEDICINE

## 2023-10-03 RX ORDER — USTEKINUMAB 90 MG/ML
90 INJECTION, SOLUTION SUBCUTANEOUS
Qty: 1 EACH | Refills: 2 | Status: ACTIVE | OUTPATIENT
Start: 2023-10-03

## 2023-10-03 RX ORDER — DIPHENHYDRAMINE HYDROCHLORIDE 50 MG/ML
25 INJECTION INTRAMUSCULAR; INTRAVENOUS
Status: CANCELLED | OUTPATIENT
Start: 2023-10-03

## 2023-10-03 RX ORDER — HEPARIN 100 UNIT/ML
500 SYRINGE INTRAVENOUS
Status: CANCELLED | OUTPATIENT
Start: 2023-10-03

## 2023-10-03 RX ORDER — ACETAMINOPHEN 325 MG/1
650 TABLET ORAL
Status: CANCELLED | OUTPATIENT
Start: 2023-10-03

## 2023-10-03 RX ORDER — IPRATROPIUM BROMIDE AND ALBUTEROL SULFATE 2.5; .5 MG/3ML; MG/3ML
3 SOLUTION RESPIRATORY (INHALATION)
Status: CANCELLED | OUTPATIENT
Start: 2023-10-03

## 2023-10-03 RX ORDER — EPINEPHRINE 1 MG/ML
0.3 INJECTION, SOLUTION, CONCENTRATE INTRAVENOUS
Status: CANCELLED | OUTPATIENT
Start: 2023-10-03

## 2023-10-03 RX ORDER — METHYLPREDNISOLONE SOD SUCC 125 MG
40 VIAL (EA) INJECTION
Status: CANCELLED | OUTPATIENT
Start: 2023-10-03

## 2023-10-03 RX ORDER — SODIUM CHLORIDE 0.9 % (FLUSH) 0.9 %
10 SYRINGE (ML) INJECTION
Status: CANCELLED | OUTPATIENT
Start: 2023-10-03

## 2023-10-03 NOTE — PROGRESS NOTES
[facility]          Inflammatory Bowel Disease Follow Up Consultation Note         TODAY'S VISIT DATE:  10/3/2023    Reason for Consult:    Chief Complaint   Patient presents with    Follow-up       PCP: Per Saab      Referring MD:   No ref. provider found    History of Present Illness:  Raiza Cherry who is a 53 y.o. female is being seen today at the [facility] on 10/03/2023 for inflammatory bowel disease- ulcerative colitis.  After our last visit we had discussed possibly starting Zeposia.  Her EKG revealed a left bundle branch block so we decided not to use this medication.  She was evaluated by Cardiology and had an echocardiogram done that was unrevealing.  We had subsequently discuss possibly starting another therapy.  She was leaning toward Stelara but she decided to defer any treatment changes at that time.  She continued to focus on trying to treat her hair loss through other means.  She received multiple different medications including prednisone, minoxidil, others.  Some of these caused worsening gastrointestinal symptoms including more diarrhea.  She is currently taking cyclosporin 50 mg daily and Plaquenil 200 mg twice daily.  She continues to take Lialda 4.8 g daily.  She does report recently she is been having looser stools.  She denies any blood in the stools.  She does have some abdominal cramping.  She is ready to consider advancing therapy at this time.    IBD History:  She has a history of left-sided ulcerative colitis.  This was diagnosed around 1990.  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  She has been managed with multiple 5 ASA products (both oral and rectal) as well as prednisone (tolerated very poorly) and Entocort. Her last colonoscopy was in October 2018 at  which time there was some mild proctitis and some atrophy in the left colon.  Earlier this year she saw Dr. Gil because of active symptoms.  She started her on Lialda 4.8 g daily and Canasa suppositories twice daily initially.  After she was doing better these were reduced down to once daily.  Eventually she stop the Canasa suppositories and was only taking Lialda 4.8 g daily.  She noted a significant increase in her symptoms after being off of the suppositories.  In January 2022 she underwent colonoscopy that showed active inflammation of the left colon.  At that time she reported that she was not taking Lialda for the last few months and was only taking Canasa suppositories.  She started taking Lialda 4.8 g daily and Canasa suppositories daily on a consistent basis in early 2022. A stool calprotectin level in early May 2022 was markedly elevated.  She did not submit a sample prior to starting the Lialda so I do not have a baseline to compare to.  A repeat sigmoidoscopy was done in early 2023 that showed ongoing disease activity.  We discussed possibly starting Zeposia but due to a left bundle branch block that was found on her EKG we decided against this.  We discussed starting Stelara but she decided to focus on treating her hair loss issues.    IBD Details:  Dx Date:  1990  Disease type/distribution:  Ulcerative colitis/left colon disease extending to the descending colon  Current Treatment:  Lialda 4.8 g daily  Start Date:  July 2020  Response:  Incomplete  Optimized:  Yes  Adverse reactions:  None  Prior surgeries:  None  CRP Elevation:  No  Disease Complications:  None  Extraintestinal manifestations:  Hair loss  Prior treatments:   Steroids:  Good response  5ASA:  Incomplete response  IMM:  None  TNF Inh:  None   Anti-Integrin:  None   IL 12/23:  None  ELIZABETH Inh:  None    Previous Clinical Trials:  None    Last Colonoscopy:   January 2022-inflammation in the rectum, sigmoid, descending colon, otherwise  "normal     October 2018-rectal inflammation, atrophy of the left colon, otherwise normal    Other Endoscopies:  None    Imaging:   MRE:  None   CT:  None   Other:  None    Pertinent Labs:  Lab Results   Component Value Date    SEDRATE 6 06/12/2019    CRP 0.6 03/02/2023     Lab Results   Component Value Date    TTGIGA 5 07/29/2022     07/29/2022     Lab Results   Component Value Date    TSH 3.064 07/29/2022    FREET4 0.84 10/13/2021     Lab Results   Component Value Date    AFNPAMPM26LM 62 03/02/2023    ZTKRFUOG01 >2000 (H) 07/29/2022     Lab Results   Component Value Date    HEPBSAG Non-reactive 03/02/2023    HEPBCAB Non-reactive 03/02/2023    HEPCAB Negative 10/13/2021     Lab Results   Component Value Date    PVF25GAZF Negative 10/13/2021     Lab Results   Component Value Date    NIL 0.38579 03/14/2023    MITOGENNIL 9.929 03/14/2023     Lab Results   Component Value Date    TPTMINTERP SEE BELOW 10/13/2021     Lab Results   Component Value Date    CDIFFICILEAN Negative 07/21/2020    CDIFFTOX Negative 07/21/2020     Lab Results   Component Value Date    CALPROTECTIN 2,411.0 (H) 08/11/2022       Therapeutic Drug Monitoring Labs:  No results found for: "PROMETH"  No results found for: "ANSADAINIT", "INFLIXIMAB", "INFLIXINTERP"    Vaccinations:  Lab Results   Component Value Date    HEPBSAB Negative 10/13/2021     Lab Results   Component Value Date    HEPAIGG Negative 10/13/2021     Lab Results   Component Value Date    VARICELLAZOS 4.03 (H) 10/13/2021    VARICELLAINT Positive (A) 10/13/2021     Immunization History   Administered Date(s) Administered    COVID-19, MRNA, LN-S, PF (Pfizer) (Purple Cap) 03/06/2021, 03/27/2021, 12/04/2021    Influenza 09/30/2019, 10/01/2020, 11/01/2021    Pneumococcal Conjugate - 13 Valent 10/15/2020    Pneumococcal Polysaccharide - 23 Valent 02/04/2022    Tdap 09/23/2021    Zoster Recombinant 02/04/2022, 04/11/2022         Review of Systems  Review of Systems   Constitutional:  " Negative for chills, fever and weight loss.   HENT:  Negative for sore throat.    Eyes:  Negative for pain, discharge and redness.   Respiratory:  Negative for cough, shortness of breath and wheezing.    Cardiovascular:  Negative for chest pain, orthopnea and leg swelling.   Gastrointestinal:  Negative for abdominal pain, blood in stool, constipation, diarrhea, heartburn, melena, nausea and vomiting.   Genitourinary:  Negative for dysuria, frequency and urgency.   Musculoskeletal:  Negative for back pain, joint pain and myalgias.   Skin:  Negative for itching and rash.        Hair loss   Neurological:  Negative for focal weakness and seizures.   Endo/Heme/Allergies:  Does not bruise/bleed easily.   Psychiatric/Behavioral:  Negative for depression. The patient is not nervous/anxious.             Medical History:   Past Medical History:   Diagnosis Date    Abnormal Pap smear of cervix     LEEP 2016, 9-2017 LGSIL, colpo  mildly abnl cells,     Cataract     Chronic diarrhea     Glaucoma     Ulcerative colitis     proctitis       Surgical History:  Past Surgical History:   Procedure Laterality Date    AUGMENTATION OF BREAST      implants were deflated by a doctor and will be removed 9/2022    BREAST SURGERY  2014, 2018    enlargement and lift of both breasts    BREAST SURGERY Bilateral 09/2022    implants removed    CATARACT EXTRACTION W/  INTRAOCULAR LENS IMPLANT Right 10/06/2021    DR.ELLEN BULLOCK    CATARACT EXTRACTION W/  INTRAOCULAR LENS IMPLANT Left 2013    DR.ELLEN BULLOCK    CERVICAL BIOPSY  W/ LOOP ELECTRODE EXCISION  02/2016    Moderate dysplasia completely excised margins clear and ECC negative    COLONOSCOPY N/A 01/19/2022    Procedure: COLONOSCOPY;  Surgeon: Bran Breaux MD;  Location: [facility] ([address] [place] [address]);  Service: Endoscopy;  Laterality: N/A;  fully vaccinated 3/27/21, instructions sent to [email or web address]      COVID test at [place] on 1/16-GT    FLEXIBLE SIGMOIDOSCOPY N/A 02/15/2023     Procedure: SIGMOIDOSCOPY-FLEXIBLE;  Surgeon: Bran Breaux MD;  [facility]  Service: Endoscopy;  Laterality: N/A;  instr emailed -ml  Pre call done did not answer 2/9/23-DB    MASTOPEXY Bilateral 09/27/2022    Procedure: MASTOPEXY FULL WITH IMPLANT BAG REMOVAL;  Surgeon: Armen Reynolds Jr., MD;  Location: [facility];  Service: Plastics;  Laterality: Bilateral;  2HRS    TOTAL REDUCTION MAMMOPLASTY  09/2022    lift procedure and implants removed       Family History:   Family History   Problem Relation Age of Onset    Lung disease Father     Glaucoma Father     Diabetes Mother     Heart disease Mother     Lupus Sister     Asthma Sister     No Known Problems Sister     No Known Problems Sister     No Known Problems Son     No Known Problems Daughter     No Known Problems Daughter     Breast cancer Neg Hx     Colon cancer Neg Hx     Ovarian cancer Neg Hx     Cancer Neg Hx     Amblyopia Neg Hx     Blindness Neg Hx     Macular degeneration Neg Hx     Retinal detachment Neg Hx     Strabismus Neg Hx        Social History:   Social History     Tobacco Use    Smoking status: Never    Smokeless tobacco: Never   Substance Use Topics    Alcohol use: Yes     Comment: Rarely    Drug use: No       Allergies: Reviewed    Home Medications:   Medication List with Changes/Refills   Current Medications    ASCORBIC ACID, VITAMIN C, (VITAMIN C) 100 MG TABLET    Take 100 mg by mouth once daily.    CHOLECALCIFEROL, VITAMIN D3, (VITAMIN D3 ORAL)    Take by mouth once daily. Not sure    CLOBETASOL (CLOBEX) 0.05 % SHAMPOO    SMARTSIG:Topical 2-3 Times Weekly    CYCLOSPORINE MODIFIED, NEORAL, (NEORAL) 50 MG CAPSULE    Take 50 mg by mouth once daily.    DOXYCYCLINE (PERIOSTAT) 20 MG TABLET    Take 20 mg by mouth 2 (two) times daily.    ESTRADIOL-NORETHINDRONE (COMBIPATCH) 0.05-0.14 MG/24 HR    Apply patch to clean skin and change twice weekly    FLUOCINOLONE AND SHOWER CAP 0.01 % OIL    Apply topically every evening.  "   HYDROXYCHLOROQUINE (PLAQUENIL) 200 MG TABLET    Take 200 mg by mouth 2 (two) times daily.    LATANOPROST 0.005 % OPHTHALMIC SOLUTION    Place 1 drop into both eyes nightly.    LIALDA 1.2 GRAM TBEC    TAKE 4 TABLETS BY MOUTH DAILY WITH BREAKFAST.    MESALAMINE (CANASA) 1000 MG SUPP    PLACE 1 SUPPOSITORY (1,000 MG TOTAL) RECTALLY 2 (TWO) TIMES DAILY. (INSURANCE COVERS ONLY 1 DAILY)    PREDNISONE (DELTASONE) 5 MG TABLET    Take by mouth.    TIMOLOL MALEATE 0.5% (TIMOPTIC) 0.5 % DROP    Place 1 drop into both eyes 2 (two) times daily.    TRETINOIN (RETIN-A) 0.05 % CREAM    Apply topically nightly.    UNABLE TO FIND    Take by mouth once daily. Nutrafol       Physical Exam:  Vital Signs:  /70   Pulse 67   Ht 5' 5" (1.651 m)   Wt 54.3 kg (119 lb 11.4 oz)   LMP 04/06/2018   SpO2 100%   BMI 19.92 kg/m²   Body mass index is 19.92 kg/m².    Physical Exam  Vitals and nursing note reviewed.   Constitutional:       General: She is not in acute distress.     Appearance: She is well-developed.   Eyes:      Conjunctiva/sclera: Conjunctivae normal.      Pupils: Pupils are equal, round, and reactive to light.   Neck:      Thyroid: No thyromegaly.   Cardiovascular:      Rate and Rhythm: Normal rate and regular rhythm.      Heart sounds: Normal heart sounds. No murmur heard.  Pulmonary:      Effort: Pulmonary effort is normal.      Breath sounds: Normal breath sounds. No wheezing or rales.   Abdominal:      General: Bowel sounds are normal. There is no distension.      Palpations: Abdomen is soft. There is no mass.      Tenderness: There is no abdominal tenderness.   Musculoskeletal:         General: No tenderness. Normal range of motion.   Lymphadenopathy:      Cervical: No cervical adenopathy.   Skin:     General: Skin is warm and dry.      Findings: No erythema or rash.   Neurological:      Mental Status: She is alert and oriented to person, place, and time.   Psychiatric:         Behavior: Behavior normal. "         Labs: reviewed and pertinent noted above    Assessment/Plan:  Raiza Cherry is a 53 y.o. female with left-sided ulcerative colitis. The following issues were addresssed:    1. Ulcerative rectosigmoiditis with rectal bleeding    2. Hair loss      1.  Ulcerative colitis:  She is having more active symptoms recently.  She is finally ready to consider other treatment options for her ulcerative colitis.  Today we discussed a few different options.  I do not think a TNF inhibitor is a good option for her as she has concerns over risks of infection and other side effects.  I think Entyvio or Stelara would be a good option for her.  We discussed these 2 medications in detail today including mechanism of action, method of administration, expected time to onset of action, and risk of side effects today.  After our discussion she would like to start Stelara.  I think this is a good option for her.  We will plan to get this started as soon as possible.  Once she is started we will plan to repeat a stool calprotectin when she is been on therapy for 12 weeks to assess for response.    2. Hair loss:  She continues to have hair loss and some nail issues that have been ongoing in spite of treatment with her dermatologist.  We discussed the fact that it may be that some of these issues related active IBD.          # IBD specific health maintenance:  Colon cancer surveillance:  Up-to-date    Annual:  - Eye exam:  Not applicable  - Skin exam (if on IMM/TNF):  Up-to-date  - reminded pt to use sunblock/hats/sunprotective clothing  - PAP (if immunosuppressed):  June 2021    DEXA:  Not applicable    Vitamin D:  Normal    Vaccines:    Influenza:  Up-to-date   Pneumovax:     PCV13:  Received    PSV23:  Up-to-date   HAV:  Discuss in the future   HBV:  Discuss in the   Tdap:  Needs to be updated   MMR:  Immune   VZV:  Immune   HZV:  Up-to-date   HPV:  Not applicable   Meningococcus:  Not applicable   COVID:  Completed series and  booster      Follow up: Follow up in about 4 months (around 2/3/2024).    Thank you again for sending Raiza Cherry to see Dr. Shakir Breaux today at the [facility]. Please don't hesitate to contact Dr. Breaux if there are any questions regarding this evaluation, or if you have any other patients with inflammatory bowel disease for whom you would like a consultation. You can reach Dr. Breaux at [phone] or by email at [email or web address]    Bran Breaux MD  Department of Gastroenterology  Inflammatory Bowel Disease

## 2023-10-03 NOTE — PROGRESS NOTES
"[facility]  Inflammatory Bowel Disease  Pharmacy Note    TODAY'S VISIT DATE:  10/3/2023    Reason for visit: New start biologic    IBD treatment to be initiated/optimized: Stelara    Consent form: No    IBD MD: Saeid       Pertinent History:  IBD phenotype: Ulcerative colitis/left colon disease extending to the descending colon   Dispensing pharmacy/infusion center:  [facility]  Current therapy: liald 4.8 (started 7/2020)     Therapeutic Drug Monitoring Labs:  N/A    Prior IBD Therapies:  5ASA - ineffective     Vaccinations:  Lab Results   Component Value Date    HEPBSAB Negative 10/13/2021     No results found for: "HEPBSURFABQU"  Lab Results   Component Value Date    HEPAIGG Negative 10/13/2021     Lab Results   Component Value Date    VARICELLAZOS 4.03 (H) 10/13/2021    VARICELLAINT Positive (A) 10/13/2021     Lab Results   Component Value Date    MUMPSIGGSCRE 2.14 (H) 10/13/2021    MUMPSIGGINTE Positive (A) 10/13/2021     Lab Results   Component Value Date    RUBEOLAIGGAN 2.25 (H) 10/13/2021    RUBEOLAINTER Positive (A) 10/13/2021     Immunization History   Administered Date(s) Administered    COVID-19, MRNA, LN-S, PF (Pfizer) (Purple Cap) 03/06/2021, 03/27/2021, 12/04/2021    Influenza 09/30/2019, 10/01/2020, 11/01/2021    Pneumococcal Conjugate - 13 Valent 10/15/2020    Pneumococcal Polysaccharide - 23 Valent 02/04/2022    Tdap 09/23/2021    Zoster Recombinant 02/04/2022, 04/11/2022       Influenza: reports getting it recently. Recommended yearly   PCV 20: discussed and recommended 5 years after last pneumonia shot. Repeat 2027.   Tetanus (TdaP): booster recommended every 10 years. Repeat 2031  Hepatitis B:  not immune. Discussed and recommended hapleisav B 2 doses 1 month apart   Hepatitis A:  not immune. Discussed and recommended havrix 2 doses 6-12 months apart   MMR (live vaccine):  immune      Chickenpox status/Varicella (live vaccine): immune  Shingrix: completed  Covid:  " deferred  RSV: N/A      All Medical History/Surgical History/Family History/Social History/Allergies have been reviewed and updated in EMR    Review of patient's allergies indicates:   Allergen Reactions    Penicillin Rash         Current Medications:   No outpatient medications have been marked as taking for the 10/3/23 encounter (Appointment) with IBD PHARMACIST.       Reviewed all current medications including OTC, herbals and supplements.     Labs:   Lab Results   Component Value Date    HEPBSAG Non-reactive 03/02/2023    HEPBCAB Non-reactive 03/02/2023     Lab Results   Component Value Date    TBGOLDPLUS Negative 03/14/2023     Lab Results   Component Value Date    DNAXOAPQ91DR 62 03/02/2023    MWAOACVX90 >2000 (H) 07/29/2022     Lab Results   Component Value Date    WBC 7.65 03/02/2023    HGB 13.5 03/02/2023    HCT 42.5 03/02/2023    MCV 97 03/02/2023     03/02/2023     Lab Results   Component Value Date    CREATININE 0.7 03/02/2023    ALBUMIN 4.4 03/02/2023    BILITOT 0.5 03/02/2023    ALKPHOS 89 03/02/2023    AST 17 03/02/2023    ALT 14 03/02/2023         Assessment/Plan:  Raiza Cherry is a 53 y.o. female that  has a past medical history of Abnormal Pap smear of cervix, Cataract, Chronic diarrhea, Glaucoma, and Ulcerative colitis. Patient presents to clinic for a follow up visit with Dr. Breaux. She is ready to start treatment. Currently on liald, but without good response. Most recent flex sig from 2/2023 showed active inflammation. Attempt to start zeposia was not successful due to EKG showing left bundle branch block. She is referred to see IBD pharmacist to review stelara.      # Recommendations:  - Educated patient on mechanism of action, frequency and route of administration, onset of action, and safety profile of Stelara  - Therapy plan for stelara IV sent to [facility].   - Script for stelara SC sent to [facility].  - Encouraged pt to practice proper hand hygiene considering increased risk of  infection with biologics. Pt to make us aware if she ever experiences s/sx of an infection including fever, chills, URI symptoms or UTI symptoms.   - Labs up to date: CBC/CMP 3/2023; TB; Hep B 3/2023. Once started on stelara repeat CBC CMP every 6 months, TB and Hep B yearly   - Discussed importance of immunizations considering the increased risk of infections. Recommended Hep A and Hep B vaccine series.   - Pt to avoid live vaccines and uncooked/ raw seafood such as raw oysters or sushi.   - Advised pt to use sun protection and get annual skin checks considering possible increased risk of skin cancer   - Recommended against use of NSAIDs including motrin,ibuprofen, advil, aleve etc  - Follow up for injection training 8 weeks after IV dose completed    - Patient verbalized understanding instructions       Jen Thompson, PharmD  IBD Clinical Pharmacist

## 2023-10-04 ENCOUNTER — PATIENT MESSAGE (OUTPATIENT)
Dept: OBSTETRICS AND GYNECOLOGY | Facility: CLINIC | Age: 53
End: 2023-10-04
Payer: COMMERCIAL

## 2023-10-04 DIAGNOSIS — L65.9 HAIR LOSS: Primary | ICD-10-CM

## 2023-10-05 NOTE — TELEPHONE ENCOUNTER
She had thyroid done in 2022 last year.   I am happy to repeat labs this year and check a iron level also as that could be a source.  Labs entered  Can kushal to [facility] lab to be drawn

## 2023-10-06 ENCOUNTER — PATIENT MESSAGE (OUTPATIENT)
Dept: GASTROENTEROLOGY | Facility: CLINIC | Age: 53
End: 2023-10-06
Payer: COMMERCIAL

## 2023-10-19 ENCOUNTER — TELEPHONE (OUTPATIENT)
Dept: GASTROENTEROLOGY | Facility: CLINIC | Age: 53
End: 2023-10-19
Payer: COMMERCIAL

## 2023-10-19 NOTE — TELEPHONE ENCOUNTER
-Clarification Request done Verbally on the phone  -spoke with Pharmacist Holly  -clarified IV appointment on 10/30/2023  -ORDER#[id number]  -phone call was 23 min

## 2023-10-31 ENCOUNTER — TELEPHONE (OUTPATIENT)
Dept: GASTROENTEROLOGY | Facility: CLINIC | Age: 53
End: 2023-10-31
Payer: COMMERCIAL

## 2023-10-31 NOTE — TELEPHONE ENCOUNTER
----- Message from Jen Thompson PharmD sent at 10/31/2023 11:36 AM CDT -----  Kadie TORRES completed 10/30  Due for stelara SC on christmas 12/25. Please discuss f/u visit and injection training can be done the Friday before 12/22 or day after christmas 12/26     Thanks,   Jen

## 2023-10-31 NOTE — TELEPHONE ENCOUNTER
Attempted to call to schedule Stelara injection training  LM to return call [phone] , will send portal

## 2023-11-02 ENCOUNTER — OFFICE VISIT (OUTPATIENT)
Dept: OBSTETRICS AND GYNECOLOGY | Facility: CLINIC | Age: 53
End: 2023-11-02
Attending: OBSTETRICS & GYNECOLOGY
Payer: COMMERCIAL

## 2023-11-02 ENCOUNTER — LAB VISIT (OUTPATIENT)
Dept: LAB | Facility: HOSPITAL | Age: 53
End: 2023-11-02
Attending: OBSTETRICS & GYNECOLOGY
Payer: COMMERCIAL

## 2023-11-02 VITALS — HEIGHT: 65 IN | WEIGHT: 116.88 LBS | BODY MASS INDEX: 19.47 KG/M2

## 2023-11-02 DIAGNOSIS — Z12.4 ENCOUNTER FOR PAPANICOLAOU SMEAR FOR CERVICAL CANCER SCREENING: ICD-10-CM

## 2023-11-02 DIAGNOSIS — N95.0 PMB (POSTMENOPAUSAL BLEEDING): ICD-10-CM

## 2023-11-02 DIAGNOSIS — Z11.51 ENCOUNTER FOR SCREENING FOR HUMAN PAPILLOMAVIRUS (HPV): ICD-10-CM

## 2023-11-02 DIAGNOSIS — N95.1 MENOPAUSAL HOT FLUSHES: ICD-10-CM

## 2023-11-02 DIAGNOSIS — Z01.419 ENCOUNTER FOR GYNECOLOGICAL EXAMINATION: Primary | ICD-10-CM

## 2023-11-02 DIAGNOSIS — L65.9 HAIR LOSS: ICD-10-CM

## 2023-11-02 LAB
BASOPHILS # BLD AUTO: 0.11 K/UL (ref 0–0.2)
BASOPHILS NFR BLD: 1.4 % (ref 0–1.9)
DIFFERENTIAL METHOD: ABNORMAL
EOSINOPHIL # BLD AUTO: 0.1 K/UL (ref 0–0.5)
EOSINOPHIL NFR BLD: 1.5 % (ref 0–8)
ERYTHROCYTE [DISTWIDTH] IN BLOOD BY AUTOMATED COUNT: 12.2 % (ref 11.5–14.5)
FERRITIN SERPL-MCNC: 63 NG/ML (ref 20–300)
HCT VFR BLD AUTO: 42.1 % (ref 37–48.5)
HGB BLD-MCNC: 14.1 G/DL (ref 12–16)
IMM GRANULOCYTES # BLD AUTO: 0.02 K/UL (ref 0–0.04)
IMM GRANULOCYTES NFR BLD AUTO: 0.3 % (ref 0–0.5)
LYMPHOCYTES # BLD AUTO: 2.2 K/UL (ref 1–4.8)
LYMPHOCYTES NFR BLD: 27.5 % (ref 18–48)
MCH RBC QN AUTO: 32.5 PG (ref 27–31)
MCHC RBC AUTO-ENTMCNC: 33.5 G/DL (ref 32–36)
MCV RBC AUTO: 97 FL (ref 82–98)
MONOCYTES # BLD AUTO: 0.7 K/UL (ref 0.3–1)
MONOCYTES NFR BLD: 9.2 % (ref 4–15)
NEUTROPHILS # BLD AUTO: 4.7 K/UL (ref 1.8–7.7)
NEUTROPHILS NFR BLD: 60.1 % (ref 38–73)
NRBC BLD-RTO: 0 /100 WBC
PLATELET # BLD AUTO: 408 K/UL (ref 150–450)
PMV BLD AUTO: 10.2 FL (ref 9.2–12.9)
RBC # BLD AUTO: 4.34 M/UL (ref 4–5.4)
T3FREE SERPL-MCNC: 2.9 PG/ML (ref 2.3–4.2)
T4 FREE SERPL-MCNC: 1.03 NG/DL (ref 0.71–1.51)
TSH SERPL DL<=0.005 MIU/L-ACNC: 1.83 UIU/ML (ref 0.4–4)
WBC # BLD AUTO: 7.82 K/UL (ref 3.9–12.7)

## 2023-11-02 PROCEDURE — 84439 ASSAY OF FREE THYROXINE: CPT | Performed by: OBSTETRICS & GYNECOLOGY

## 2023-11-02 PROCEDURE — 3008F PR BODY MASS INDEX (BMI) DOCUMENTED: ICD-10-PCS | Mod: CPTII,S$GLB,, | Performed by: OBSTETRICS & GYNECOLOGY

## 2023-11-02 PROCEDURE — 88175 CYTOPATH C/V AUTO FLUID REDO: CPT | Performed by: OBSTETRICS & GYNECOLOGY

## 2023-11-02 PROCEDURE — 87624 HPV HI-RISK TYP POOLED RSLT: CPT | Performed by: OBSTETRICS & GYNECOLOGY

## 2023-11-02 PROCEDURE — 99396 PREV VISIT EST AGE 40-64: CPT | Mod: S$GLB,,, | Performed by: OBSTETRICS & GYNECOLOGY

## 2023-11-02 PROCEDURE — 82728 ASSAY OF FERRITIN: CPT | Performed by: OBSTETRICS & GYNECOLOGY

## 2023-11-02 PROCEDURE — 99999 PR PBB SHADOW E&M-EST. PATIENT-LVL III: CPT | Mod: PBBFAC,,, | Performed by: OBSTETRICS & GYNECOLOGY

## 2023-11-02 PROCEDURE — 36415 COLL VENOUS BLD VENIPUNCTURE: CPT | Performed by: OBSTETRICS & GYNECOLOGY

## 2023-11-02 PROCEDURE — 1159F PR MEDICATION LIST DOCUMENTED IN MEDICAL RECORD: ICD-10-PCS | Mod: CPTII,S$GLB,, | Performed by: OBSTETRICS & GYNECOLOGY

## 2023-11-02 PROCEDURE — 99999 PR PBB SHADOW E&M-EST. PATIENT-LVL III: ICD-10-PCS | Mod: PBBFAC,,, | Performed by: OBSTETRICS & GYNECOLOGY

## 2023-11-02 PROCEDURE — 84481 FREE ASSAY (FT-3): CPT | Performed by: OBSTETRICS & GYNECOLOGY

## 2023-11-02 PROCEDURE — 1159F MED LIST DOCD IN RCRD: CPT | Mod: CPTII,S$GLB,, | Performed by: OBSTETRICS & GYNECOLOGY

## 2023-11-02 PROCEDURE — 84443 ASSAY THYROID STIM HORMONE: CPT | Performed by: OBSTETRICS & GYNECOLOGY

## 2023-11-02 PROCEDURE — 85025 COMPLETE CBC W/AUTO DIFF WBC: CPT | Performed by: OBSTETRICS & GYNECOLOGY

## 2023-11-02 PROCEDURE — 99396 PR PREVENTIVE VISIT,EST,40-64: ICD-10-PCS | Mod: S$GLB,,, | Performed by: OBSTETRICS & GYNECOLOGY

## 2023-11-02 PROCEDURE — 3008F BODY MASS INDEX DOCD: CPT | Mod: CPTII,S$GLB,, | Performed by: OBSTETRICS & GYNECOLOGY

## 2023-11-02 RX ORDER — MINOXIDIL 2.5 MG/1
TABLET ORAL
COMMUNITY
Start: 2023-10-30

## 2023-11-02 RX ORDER — ESTRADIOL/NORETHINDRONE ACETATE TRANSDERMAL SYSTEM .05; .14 MG/D; MG/D
PATCH, EXTENDED RELEASE TRANSDERMAL
Qty: 8 PATCH | Refills: 11 | Status: SHIPPED | OUTPATIENT
Start: 2023-11-02 | End: 2024-11-01

## 2023-11-02 RX ORDER — DUTASTERIDE 0.5 MG/1
0.5 CAPSULE, LIQUID FILLED ORAL
COMMUNITY
Start: 2023-07-24

## 2023-11-02 NOTE — PROGRESS NOTES
Chief Complaint Well woman exam:  Annual Exam    She is established    Raiza Cherry is a 53 y.o. female  presents for a well woman exam.    Doing well on Combi patch   Seeing Dr Salgado for hair loss  After starting Minoxidil had a spot when she wiped   No dysuria or blood in urine   Did not saturate pad     Review of Systems - :   No abdominal pain, No vaginal bleeding or discharge,   No breast pain or masses, No rectal bleeding     LMP: Patient's last menstrual period was 2018..    Meds per MD: CombiPatch     Last Pap:  pap & hpv negative  Last MM2023 birads 1 OHS  Last Colonoscopy:  skin tags       Past Medical History:   Diagnosis Date    Abnormal Pap smear of cervix     LEEP ,  LGSIL, colpo  mildly abnl cells,     Cataract     Chronic diarrhea     Glaucoma     Ulcerative colitis     proctitis       Past Surgical History:   Procedure Laterality Date    AUGMENTATION OF BREAST      implants were deflated by a doctor and will be removed 2022    BREAST SURGERY  ,     enlargement and lift of both breasts    BREAST SURGERY Bilateral 2022    implants removed    CATARACT EXTRACTION W/  INTRAOCULAR LENS IMPLANT Right 10/06/2021    DR.ELLEN BULLOCK    CATARACT EXTRACTION W/  INTRAOCULAR LENS IMPLANT Left     DR.ELLEN BULLOCK    CERVICAL BIOPSY  W/ LOOP ELECTRODE EXCISION  2016    Moderate dysplasia completely excised margins clear and ECC negative    COLONOSCOPY N/A 2022    Procedure: COLONOSCOPY;  Surgeon: Bran Breaux MD;  Location: [facility] ([address] [place] [address]);  Service: Endoscopy;  Laterality: N/A;  fully vaccinated 3/27/21, instructions sent to [facility]      COVID test at [place] on -GT    FLEXIBLE SIGMOIDOSCOPY N/A 02/15/2023    Procedure: SIGMOIDOSCOPY-FLEXIBLE;  Surgeon: Bran Breaux MD;  [facility]  Service: Endoscopy;  Laterality: N/A;  instr emailed -ml  Pre call done did not answer 23-DB  "   MASTOPEXY Bilateral 2022    Procedure: MASTOPEXY FULL WITH IMPLANT BAG REMOVAL;  Surgeon: Armen Reynolds Jr., MD;  Location: [facility];  Service: Plastics;  Laterality: Bilateral;  2HRS    TOTAL REDUCTION MAMMOPLASTY  2022    lift procedure and implants removed       OB History    Para Term  AB Living   3 3 3     3   SAB IAB Ectopic Multiple Live Births           3      # Outcome Date GA Lbr Jordon/2nd Weight Sex Delivery Anes PTL Lv   3 Term 10/24/07 40w0d  3.204 kg (7 lb 1 oz) M Vag-Spont EPI  KM   2 Term 96 40w0d  3.43 kg (7 lb 9 oz) F Vag-Spont EPI  KM   1 Term 04/15/93 40w0d  3.09 kg (6 lb 13 oz) F Vag-Spont EPI  KM      Obstetric Comments   Menarche ~ 11       Family History   Problem Relation Age of Onset    Lung disease Father     Glaucoma Father     Diabetes Mother     Heart disease Mother     Lupus Sister     Asthma Sister     No Known Problems Sister     No Known Problems Sister     No Known Problems Son     No Known Problems Daughter     No Known Problems Daughter     Breast cancer Neg Hx     Colon cancer Neg Hx     Ovarian cancer Neg Hx     Cancer Neg Hx     Amblyopia Neg Hx     Blindness Neg Hx     Macular degeneration Neg Hx     Retinal detachment Neg Hx     Strabismus Neg Hx        Social History     Tobacco Use    Smoking status: Never    Smokeless tobacco: Never   Substance Use Topics    Alcohol use: Yes     Comment: Rarely    Drug use: No         Physical Exam:  Ht 5' 5" (1.651 m)   Wt 53 kg (116 lb 13.5 oz)   LMP 2018   BMI 19.44 kg/m²     APPEARANCE: Well nourished, well developed, in no acute distress.  AFFECT: WNL, alert and oriented x 3  SKIN: No acne or hirsutism  BREASTS: Symmetrical, no skin changes.                     No nipple discharge.   No palpable masses bilaterally  NODES: No inguinal nor axillary LAD  ABDOMEN: soft Non tender Non distended No masses  PELVIC:   Normal external genitalia without lesions.   Normal urethral meatus.    Vagina " atrophic without lesions or discharge.    Cervix atrophic, without lesions, discharge or tenderness.    Bimanual exam shows uterus to be normal size, regular, mobile and nontender.    Adnexa without masses or tenderness.    EXTREMITIES: No edema.    ASSESSMENT AND PLAN    Raiza was seen today for annual exam.    Diagnoses and all orders for this visit:    Encounter for gynecological examination    Encounter for screening for human papillomavirus (HPV)  -     HPV High Risk Genotypes, PCR    Encounter for Papanicolaou smear for cervical cancer screening  -     Liquid-Based Pap Smear, Screening    Menopausal hot flushes- much better in combipatch  -     estradiol-norethindrone (COMBIPATCH) 0.05-0.14 mg/24 hr; Apply patch to clean skin and change twice weekly    PMB (postmenopausal bleeding) discussed option of u/s vs if happens again needs u/s- pt will reach out if recurs and scheduled u/s - feels it is related to minoxidil   -     US Pelvis Comp with Transvag NON-OB (xpd; Future            Patient was counseled today on A.C.S. Pap guidelines and recommendations for yearly pelvic exams, mammograms and monthly self breast exams; to see her PCP for other health maintenance.     Patient encouraged to register for portal and results will be sent via portal.    No follow-ups on file.         Health Maintenance   Topic Date Due    Colorectal Cancer Screening  02/16/2023    Mammogram  08/04/2024    Lipid Panel  04/17/2028    TETANUS VACCINE  09/23/2031    Hepatitis C Screening  Completed    Shingles Vaccine  Completed

## 2023-11-02 NOTE — PROGRESS NOTES
LMP: Patient's last menstrual period was 2018..    Meds per MD: CombiPatch    Last Pap:  pap & hpv negative  Last MM2023 birads 1 OHS  Last Colonoscopy:  skin tags

## 2023-11-09 LAB
FINAL PATHOLOGIC DIAGNOSIS: NORMAL
Lab: NORMAL

## 2023-11-10 ENCOUNTER — PATIENT MESSAGE (OUTPATIENT)
Dept: GASTROENTEROLOGY | Facility: CLINIC | Age: 53
End: 2023-11-10
Payer: COMMERCIAL

## 2023-11-16 ENCOUNTER — PATIENT MESSAGE (OUTPATIENT)
Dept: GASTROENTEROLOGY | Facility: CLINIC | Age: 53
End: 2023-11-16
Payer: COMMERCIAL

## 2023-11-24 ENCOUNTER — HOSPITAL ENCOUNTER (OUTPATIENT)
Dept: RADIOLOGY | Facility: HOSPITAL | Age: 53
Discharge: HOME | End: 2023-11-24
Attending: OBSTETRICS & GYNECOLOGY
Payer: COMMERCIAL

## 2023-11-24 DIAGNOSIS — N95.0 PMB (POSTMENOPAUSAL BLEEDING): ICD-10-CM

## 2023-11-24 PROCEDURE — 76856 US EXAM PELVIC COMPLETE: CPT | Mod: 26,,, | Performed by: RADIOLOGY

## 2023-11-24 PROCEDURE — 76856 US PELVIS COMP WITH TRANSVAG NON-OB (XPD): ICD-10-PCS | Mod: 26,,, | Performed by: RADIOLOGY

## 2023-11-24 PROCEDURE — 76830 US PELVIS COMP WITH TRANSVAG NON-OB (XPD): ICD-10-PCS | Mod: 26,,, | Performed by: RADIOLOGY

## 2023-11-24 PROCEDURE — 76830 TRANSVAGINAL US NON-OB: CPT | Mod: TC

## 2023-11-24 PROCEDURE — 76830 TRANSVAGINAL US NON-OB: CPT | Mod: 26,,, | Performed by: RADIOLOGY

## 2023-11-28 ENCOUNTER — PATIENT MESSAGE (OUTPATIENT)
Dept: OBSTETRICS AND GYNECOLOGY | Facility: CLINIC | Age: 53
End: 2023-11-28
Payer: COMMERCIAL

## 2023-11-28 NOTE — TELEPHONE ENCOUNTER
Hi Dr. Robles, did you send me a message on this pt?  There was no message when it was sent to me.

## 2023-12-21 ENCOUNTER — OFFICE VISIT (OUTPATIENT)
Dept: OBSTETRICS AND GYNECOLOGY | Facility: CLINIC | Age: 53
End: 2023-12-21
Payer: COMMERCIAL

## 2023-12-21 VITALS — WEIGHT: 119.06 LBS | HEIGHT: 65 IN | BODY MASS INDEX: 19.83 KG/M2

## 2023-12-21 DIAGNOSIS — N84.0 ABNORMAL UTERINE BLEEDING DUE TO ENDOMETRIAL POLYP: Primary | ICD-10-CM

## 2023-12-21 DIAGNOSIS — N93.9 ABNORMAL UTERINE BLEEDING DUE TO ENDOMETRIAL POLYP: Primary | ICD-10-CM

## 2023-12-21 DIAGNOSIS — N84.0 UTERINE POLYP: ICD-10-CM

## 2023-12-21 PROCEDURE — 99999 PR PBB SHADOW E&M-EST. PATIENT-LVL II: ICD-10-PCS | Mod: PBBFAC,,, | Performed by: OBSTETRICS & GYNECOLOGY

## 2023-12-21 PROCEDURE — 99999 PR PBB SHADOW E&M-EST. PATIENT-LVL II: CPT | Mod: PBBFAC,,, | Performed by: OBSTETRICS & GYNECOLOGY

## 2023-12-21 PROCEDURE — 99499 NO LOS: ICD-10-PCS | Mod: S$GLB,,, | Performed by: OBSTETRICS & GYNECOLOGY

## 2023-12-21 PROCEDURE — 99499 UNLISTED E&M SERVICE: CPT | Mod: S$GLB,,, | Performed by: OBSTETRICS & GYNECOLOGY

## 2023-12-21 NOTE — PROGRESS NOTES
53 y.o.  presents for pre-op H&P for   Hyst D&C polypectomy for 8mm polyp on    Doing well on Combi patch but had one episode pf spotting 2 mo ago   U/s done which showed an 8mm polyp     Patient's last menstrual period was 2018..    U/s Impression:   Mild thickening of the endometrium measuring 6 mm.  In addition there appears to be a 7 or 8 mm relatively hypoechoic uniform lesion in the mid aspect of the endometrium which could represent an endometrial polyp or submucosal lesion.  Further evaluation with hysteroscopy and/or biopsy might be considered.   Multiple uterine fibroids, as above.  These appear to be new since previous examination.       Past Medical History:   Diagnosis Date    Abnormal Pap smear of cervix     LEEP ,  LGSIL, colpo  mildly abnl cells,     Cataract     Chronic diarrhea     Glaucoma     Ulcerative colitis     proctitis     Past Surgical History:   Procedure Laterality Date    AUGMENTATION OF BREAST      implants were deflated by a doctor and will be removed 2022    BREAST SURGERY  ,     enlargement and lift of both breasts    BREAST SURGERY Bilateral 2022    implants removed    CATARACT EXTRACTION W/  INTRAOCULAR LENS IMPLANT Right 10/06/2021    DR.ELLEN BULLOCK    CATARACT EXTRACTION W/  INTRAOCULAR LENS IMPLANT Left     DR.ELLEN BULLOCK    CERVICAL BIOPSY  W/ LOOP ELECTRODE EXCISION  2016    Moderate dysplasia completely excised margins clear and ECC negative    COLONOSCOPY N/A 2022    Procedure: COLONOSCOPY;  Surgeon: Bran Breaux MD;  Location: [facility] ([address] [place] [address]);  Service: Endoscopy;  Laterality: N/A;  fully vaccinated 3/27/21, instructions sent to [facility]      COVID test at [facility] on -GT    FLEXIBLE SIGMOIDOSCOPY N/A 02/15/2023    Procedure: SIGMOIDOSCOPY-FLEXIBLE;  Surgeon: Bran Breaux MD;  [facility]  Service: Endoscopy;  Laterality: N/A;  instr emailed -ml  Pre call done  did not answer 2/9/23-DB    MASTOPEXY Bilateral 09/27/2022    Procedure: MASTOPEXY FULL WITH IMPLANT BAG REMOVAL;  Surgeon: Armen Reynolds Jr., MD;  Location: [facility];  Service: Plastics;  Laterality: Bilateral;  2HRS    TOTAL REDUCTION MAMMOPLASTY  09/2022    lift procedure and implants removed     Family History   Problem Relation Age of Onset    Lung disease Father     Glaucoma Father     Diabetes Mother     Heart disease Mother     Lupus Sister     Asthma Sister     No Known Problems Sister     No Known Problems Sister     No Known Problems Son     No Known Problems Daughter     No Known Problems Daughter     Breast cancer Neg Hx     Colon cancer Neg Hx     Ovarian cancer Neg Hx     Cancer Neg Hx     Amblyopia Neg Hx     Blindness Neg Hx     Macular degeneration Neg Hx     Retinal detachment Neg Hx     Strabismus Neg Hx      Review of patient's allergies indicates:   Allergen Reactions    Penicillin Rash       Current Outpatient Medications:     ascorbic acid, vitamin C, (VITAMIN C) 100 MG tablet, Take 100 mg by mouth once daily., Disp: , Rfl:     cholecalciferol, vitamin D3, (VITAMIN D3 ORAL), Take by mouth once daily. Not sure, Disp: , Rfl:     clobetasoL (CLOBEX) 0.05 % shampoo, SMARTSIG:Topical 2-3 Times Weekly, Disp: , Rfl:     cycloSPORINE modified, NEORAL, (NEORAL) 50 MG capsule, Take 50 mg by mouth once daily., Disp: , Rfl:     dutasteride (AVODART) 0.5 mg capsule, Take 0.5 mg by mouth., Disp: , Rfl:     estradiol-norethindrone (COMBIPATCH) 0.05-0.14 mg/24 hr, Apply patch to clean skin and change twice weekly, Disp: 8 patch, Rfl: 11    fluocinolone and shower cap 0.01 % Oil, Apply topically every evening., Disp: , Rfl:     hydrOXYchloroQUINE (PLAQUENIL) 200 mg tablet, Take 200 mg by mouth 2 (two) times daily., Disp: , Rfl:     latanoprost 0.005 % ophthalmic solution, Place 1 drop into both eyes nightly., Disp: , Rfl:     LIALDA 1.2 gram TbEC, TAKE 4 TABLETS BY MOUTH DAILY WITH BREAKFAST., Disp: 120  tablet, Rfl: 5    minoxidiL (LONITEN) 2.5 MG tablet, Take by mouth., Disp: , Rfl:     timolol maleate 0.5% (TIMOPTIC) 0.5 % Drop, Place 1 drop into both eyes 2 (two) times daily., Disp: , Rfl:     tretinoin (RETIN-A) 0.05 % cream, Apply topically nightly., Disp: , Rfl:     UNABLE TO FIND, Take by mouth once daily. Nutrafol, Disp: , Rfl:     ustekinumab (STELARA) 90 mg/mL Syrg syringe, Inject 1 mL (90 mg total) into the skin every 8 weeks., Disp: 1 each, Rfl: 2  Social History     Socioeconomic History    Marital status: Single   Tobacco Use    Smoking status: Never    Smokeless tobacco: Never   Substance and Sexual Activity    Alcohol use: Yes     Comment: Rarely    Drug use: No    Sexual activity: Yes     Partners: Male     Birth control/protection: Post-menopausal     Comment:          Last pap 11/9/2023  Last pathology:  Last ultrasound: Results for orders placed during the hospital encounter of 11/24/23    US Pelvis Comp with Transvag NON-OB (xpd    Narrative  EXAMINATION:  US PELVIS COMP WITH TRANSVAG NON-OB (XPD)    CLINICAL HISTORY:  Postmenopausal bleeding    TECHNIQUE:  Transabdominal sonography of the pelvis was performed, followed by transvaginal sonography to better evaluate the uterus and ovaries.  The examination was performed of a without direct on-site radiologist supervision.  Images were electronically transmitted for interpretation.    COMPARISON:  U/S pelvis 11/17/2016    FINDINGS:  Uterus:    Size: 9.2 x 4.6 x 5.5 cm    Masses: Multiple uterine fibroids: Two intramural fibroids in the body measuring 1.4 x 1.4 x 1.2 cm and 1.1 x 0.9 x 1.1 cm; intramural fibroid in the fundus measuring 1.4 x 1.4 x 1.3 cm.  Were not noted on previous examination.    Endometrium: Mildly thickened in this post menopausal patient, measuring the 6 mm.  In addition in the central area of the endometrium there appears be assessed a 7 or 8 mm relatively hypoechoic area which could represent an endometrial polyp or  submucosal lesion.  Further assessment with endoscopy or biopsy might be considered    Cervix: Unremarkable.    Right ovary: Not visualized.    Left ovary: Not visualized.    Free Fluid: None.    Impression  Mild thickening of the endometrium measuring 6 mm.  In addition there appears to be a 7 or 8 mm relatively hypoechoic uniform lesion in the mid aspect of the endometrium which could represent an endometrial polyp or submucosal lesion.  Further evaluation with hysteroscopy and/or biopsy might be considered.    Multiple uterine fibroids, as above.  These appear to be new since previous examination.    This report was flagged in Epic as abnormal.    Electronically signed by resident: Bacilio Marrufo  Date:    11/24/2023  Time:    13:34    Electronically signed by: Elmer Solorio MD  Date:    11/24/2023  Time:    13:59    Last labs:  Lab Results   Component Value Date    WBC 7.82 11/02/2023    HGB 14.1 11/02/2023    HCT 42.1 11/02/2023    MCV 97 11/02/2023     11/02/2023       There were no vitals filed for this visit.  General Appearance: Alert, appropriate appearance for age. No acute distress, Chest/Respiratory Exam: Normal chest wall and respirations. Clear to auscultation.   Cardiovascular Exam: regular rate and rhythm,   Gastrointestinal Exam: soft, non-tender; bowel sounds normal; no masses  Pelvic Exam Female: Exam deferred.   Psychiatric Exam: Alert and oriented, appropriate affect.    Assessment:   Endometrial polyp- 8mm  PMB      Plan: Hyst D&C polypectomy for 8mm polyp on 12/27    I have discussed the risks, benefits, indications, and alternatives of the procedure in detail.  The patient verbalizes her understanding.  All questions answered.  Consents signed in office    The patient agrees to proceed to proceed as planned.

## 2023-12-22 ENCOUNTER — CLINICAL SUPPORT (OUTPATIENT)
Dept: GASTROENTEROLOGY | Facility: CLINIC | Age: 53
End: 2023-12-22
Payer: COMMERCIAL

## 2023-12-22 DIAGNOSIS — K51.311 ULCERATIVE RECTOSIGMOIDITIS WITH RECTAL BLEEDING: Primary | ICD-10-CM

## 2023-12-22 NOTE — PROGRESS NOTES
"[facility]  Inflammatory Bowel Disease  Pharmacy Note    TODAY'S VISIT DATE:  12/22/2023    Reason for visit: Injection training     IBD MD: Saeid      Pertinent History:  IBD phenotype: Ulcerative colitis/left colon disease extending to the   Signs and symptoms:  BM/ night time BM: 3-4 formed BM/day. No nocturnal BM  Blood/ Mucus: no   Abdominal pain: no   Nausea/ vomiting: no   Appetite: no changes  Rectal urgency: no changes   Symptoms of infection (current or past 1 week)? (fever >100.4 F, URI, flu-like symptoms, cough, painful urination, warm/red/painful skin or skin ulcers/wounds, tooth pain): No  Recent Antibiotics use in the last 30 days: No  Dispensing pharmacy/infusion center: [facility]   Current therapy: Stelara 90mg SC every 8 weeks (Started IV on 10/30, SC due 12/25 but injecting today 12/22, ND 2/16) + Lialda 4.8 grams/day (started 7/2020, stopped 10/30/23, restarted 11/16)   Two week gap on lialda due to miscommunication. Pt d/c when she got the IV stelara infusion. Off for 2 weeks, symptoms worsened so she restarted on 11/16. No issues since restarted    Lialda no longer on formulary for her after jan 1st    Therapeutic Drug Monitoring Labs:  N/A     Prior IBD Therapies:  5ASA - ineffective       Vaccinations:  Lab Results   Component Value Date    HEPBSAB Negative 10/13/2021     No results found for: "HEPBSURFABQU"  Lab Results   Component Value Date    HEPAIGG Negative 10/13/2021     Lab Results   Component Value Date    VARICELLAZOS 4.03 (H) 10/13/2021    VARICELLAINT Positive (A) 10/13/2021     Lab Results   Component Value Date    MUMPSIGGSCRE 2.14 (H) 10/13/2021    MUMPSIGGINTE Positive (A) 10/13/2021     Lab Results   Component Value Date    RUBEOLAIGGAN 2.25 (H) 10/13/2021    RUBEOLAINTER Positive (A) 10/13/2021     Immunization History   Administered Date(s) Administered    COVID-19, MRNA, LN-S, PF (Pfizer) (Purple Cap) 03/06/2021, 03/27/2021, 12/04/2021    Influenza " 09/30/2019, 10/01/2020, 11/01/2021, 09/07/2023    Pneumococcal Conjugate - 13 Valent 10/15/2020    Pneumococcal Polysaccharide - 23 Valent 02/04/2022    Tdap 09/23/2021    Zoster Recombinant 02/04/2022, 04/11/2022       Influenza: recommended yearly. UTD   PCV 20: recommended at least 5 years after last pneumonia shot. repeat in 2027  Tetanus (TdaP): booster recommended every 10 years. Repeat 2031  Hepatitis B:  not immune. Discussed and recommended hep B series   Hepatitis A:  not immune. Discussed and recommended hep A series   Shingrix:UTD  Covid:  deferred      All Medical History/Surgical History/Family History/Social History/Allergies have been reviewed and updated in EMR    Review of patient's allergies indicates:   Allergen Reactions    Penicillin Rash       Current Medications:   Outpatient Medications Marked as Taking for the 12/22/23 encounter (Clinical Support) with IBD PHARMACIST   Medication Sig Dispense Refill    ascorbic acid, vitamin C, (VITAMIN C) 100 MG tablet Take 100 mg by mouth once daily.      cholecalciferol, vitamin D3, (VITAMIN D3 ORAL) Take by mouth once daily. Not sure      clobetasoL (CLOBEX) 0.05 % shampoo SMARTSIG:Topical 2-3 Times Weekly      cycloSPORINE modified, NEORAL, (NEORAL) 50 MG capsule Take 50 mg by mouth once daily.      estradiol-norethindrone (COMBIPATCH) 0.05-0.14 mg/24 hr Apply patch to clean skin and change twice weekly 8 patch 11    hydrOXYchloroQUINE (PLAQUENIL) 200 mg tablet Take 200 mg by mouth 2 (two) times daily.      latanoprost 0.005 % ophthalmic solution Place 1 drop into both eyes nightly.      LIALDA 1.2 gram TbEC TAKE 4 TABLETS BY MOUTH DAILY WITH BREAKFAST. 120 tablet 5    minoxidiL (LONITEN) 2.5 MG tablet Take by mouth.      timolol maleate 0.5% (TIMOPTIC) 0.5 % Drop Place 1 drop into both eyes 2 (two) times daily.      tretinoin (RETIN-A) 0.05 % cream Apply topically nightly.      ustekinumab (STELARA) 90 mg/mL Syrg syringe Inject 1 mL (90 mg total) into  the skin every 8 weeks. 1 each 2       Reviewed all current medications including OTC, herbals and supplements.     Labs:   Lab Results   Component Value Date    HEPBSAG Non-reactive 03/02/2023    HEPBCAB Non-reactive 03/02/2023     Lab Results   Component Value Date    TBGOLDPLUS Negative 03/14/2023     Lab Results   Component Value Date    JHLXFWAP09LH 62 03/02/2023    VIDCIEBZ37 >2000 (H) 07/29/2022     Lab Results   Component Value Date    WBC 7.82 11/02/2023    HGB 14.1 11/02/2023    HCT 42.1 11/02/2023    MCV 97 11/02/2023     11/02/2023     Lab Results   Component Value Date    CREATININE 0.7 03/02/2023    ALBUMIN 4.4 03/02/2023    BILITOT 0.5 03/02/2023    ALKPHOS 89 03/02/2023    AST 17 03/02/2023    ALT 14 03/02/2023         Assessment/Plan:  Raiza Cherry is a 53 y.o. female that  has a past medical history of Abnormal Pap smear of cervix, Cataract, Chronic diarrhea, Glaucoma, and Ulcerative colitis. Patient presents to clinic for an injection training appt with IBD pharmacist. She reports tolerating IV stelara well without any issues. Due to miscommunication she stopped the lialda on the day she started stelara and 1-2 weeks later she noticed her symptoms worsening so lialda was restarted. Patient doing well since getting back on lialda with stelara. She brought her stelara prefilled syringe to clinic today and has no complaints.     #Ulcerative colitis:  - continue stelara 90mg every 8 weeks. Inject on Fridays, ND 2/16  - continue lialda 4.8mg/day. get refill early, if not possible call clinic if appeal needed.   - labs: CBC CMP repeat today then every 6 months 6/2024; TB and hep B neg 3/2023, repeat 3/2024  - repeat stool briseida at week 12 (third week of January) handed kit today   - reminded pt of next visit with Dr. Breaux 2/2024    #Injection technique  - Discussed proper storage of Stelara in the refrigerator. Ok in room temperature for up to 30 days, but if out of the fridge not  recommended to put it back in the refrigerator.   - Educated patient on proper injection technique including: hand hygiene prior to injection, gently pinch the cleaned skin, use a quick dart-like motion to insert the needle in a 45 degree angle, push the plunger all the way down and count to 5 before removing the needle.  - Counseled patient on proper disposal of the single dose pen in a sharps container or an empty bottle made of hard plastic (example: detergent bottle, milk jug etc)  - Patient was able to demonstrate the appropriate injection steps utilizing the demo pen  - Patient successfully completed the first injection of Stelara in clinic while I was observing. Expressed being comfortable with self injections in the future  - Patient verbalized understanding instructions.    # Immunocompromised state and IBD health maintenance   - Skin exam: yearly, schedule follow up appt    - Mammogram: yearly, normal 8/2023, repeat 8/2024  - PAP smear: yearly, normal 11/2023, repeat 11/2024  - Vaccines: avoid live vaccines. recommended hep A and B series.       Jen Thompson, PharmD  IBD Clinical Pharmacist

## 2023-12-26 ENCOUNTER — PATIENT MESSAGE (OUTPATIENT)
Dept: OBSTETRICS AND GYNECOLOGY | Facility: CLINIC | Age: 53
End: 2023-12-26
Payer: COMMERCIAL

## 2023-12-26 RX ORDER — FAMOTIDINE 20 MG/1
20 TABLET, FILM COATED ORAL
Status: CANCELLED | OUTPATIENT
Start: 2023-12-26

## 2023-12-26 RX ORDER — FAMOTIDINE 20 MG/1
20 TABLET, FILM COATED ORAL
Status: DISCONTINUED | OUTPATIENT
Start: 2023-12-27 | End: 2023-12-27

## 2023-12-26 RX ORDER — SODIUM CHLORIDE 9 MG/ML
INJECTION, SOLUTION INTRAVENOUS CONTINUOUS
Status: CANCELLED | OUTPATIENT
Start: 2023-12-26

## 2023-12-26 NOTE — H&P
53 y.o.  presents for  Hysteroscopy  D&C polypectomy for 8mm polyp on      Doing well on Combi patch but had one episode pf spotting 2 mo ago   U/s done which showed an 8mm polyp     Patient's last menstrual period was 2018..    U/s Impression:   Mild thickening of the endometrium measuring 6 mm.  In addition there appears to be a 7 or 8 mm relatively hypoechoic uniform lesion in the mid aspect of the endometrium which could represent an endometrial polyp or submucosal lesion.  Further evaluation with hysteroscopy and/or biopsy might be considered.   Multiple uterine fibroids, as above.  These appear to be new since previous examination.       Past Medical History:   Diagnosis Date    Abnormal Pap smear of cervix     LEEP ,  LGSIL, colpo  mildly abnl cells,     Cataract     Chronic diarrhea     Glaucoma     Ulcerative colitis     proctitis     Past Surgical History:   Procedure Laterality Date    AUGMENTATION OF BREAST      implants were deflated by a doctor and will be removed 2022    BREAST SURGERY  ,     enlargement and lift of both breasts    BREAST SURGERY Bilateral 2022    implants removed    CATARACT EXTRACTION W/  INTRAOCULAR LENS IMPLANT Right 10/06/2021    DR.ELLEN BULLOCK    CATARACT EXTRACTION W/  INTRAOCULAR LENS IMPLANT Left     DR.ELLEN BULLOCK    CERVICAL BIOPSY  W/ LOOP ELECTRODE EXCISION  2016    Moderate dysplasia completely excised margins clear and ECC negative    COLONOSCOPY N/A 2022    Procedure: COLONOSCOPY;  Surgeon: Bran Breaux MD;  Location: [facility] ([address] [place] [address]);  Service: Endoscopy;  Laterality: N/A;  fully vaccinated 3/27/21, instructions sent to [facility]      COVID test at [place] on -GT    FLEXIBLE SIGMOIDOSCOPY N/A 02/15/2023    Procedure: SIGMOIDOSCOPY-FLEXIBLE;  Surgeon: Bran Breaux MD;  [facility]  Service: Endoscopy;  Laterality: N/A;  instr emailed -ml  Pre call done did  not answer 2/9/23-DB    MASTOPEXY Bilateral 09/27/2022    Procedure: MASTOPEXY FULL WITH IMPLANT BAG REMOVAL;  Surgeon: Armen Reynolds Jr., MD;  Location: [facility];  Service: Plastics;  Laterality: Bilateral;  2HRS    TOTAL REDUCTION MAMMOPLASTY  09/2022    lift procedure and implants removed     Family History   Problem Relation Age of Onset    Lung disease Father     Glaucoma Father     Diabetes Mother     Heart disease Mother     Lupus Sister     Asthma Sister     No Known Problems Sister     No Known Problems Sister     No Known Problems Son     No Known Problems Daughter     No Known Problems Daughter     Breast cancer Neg Hx     Colon cancer Neg Hx     Ovarian cancer Neg Hx     Cancer Neg Hx     Amblyopia Neg Hx     Blindness Neg Hx     Macular degeneration Neg Hx     Retinal detachment Neg Hx     Strabismus Neg Hx      Review of patient's allergies indicates:   Allergen Reactions    Penicillin Rash       Current Outpatient Medications:     ascorbic acid, vitamin C, (VITAMIN C) 100 MG tablet, Take 100 mg by mouth once daily., Disp: , Rfl:     cholecalciferol, vitamin D3, (VITAMIN D3 ORAL), Take by mouth once daily. Not sure, Disp: , Rfl:     clobetasoL (CLOBEX) 0.05 % shampoo, SMARTSIG:Topical 2-3 Times Weekly, Disp: , Rfl:     cycloSPORINE modified, NEORAL, (NEORAL) 50 MG capsule, Take 50 mg by mouth once daily., Disp: , Rfl:     dutasteride (AVODART) 0.5 mg capsule, Take 0.5 mg by mouth., Disp: , Rfl:     estradiol-norethindrone (COMBIPATCH) 0.05-0.14 mg/24 hr, Apply patch to clean skin and change twice weekly, Disp: 8 patch, Rfl: 11    fluocinolone and shower cap 0.01 % Oil, Apply topically every evening., Disp: , Rfl:     hydrOXYchloroQUINE (PLAQUENIL) 200 mg tablet, Take 200 mg by mouth 2 (two) times daily., Disp: , Rfl:     latanoprost 0.005 % ophthalmic solution, Place 1 drop into both eyes nightly., Disp: , Rfl:     LIALDA 1.2 gram TbEC, TAKE 4 TABLETS BY MOUTH DAILY WITH BREAKFAST., Disp: 120  tablet, Rfl: 5    minoxidiL (LONITEN) 2.5 MG tablet, Take by mouth., Disp: , Rfl:     timolol maleate 0.5% (TIMOPTIC) 0.5 % Drop, Place 1 drop into both eyes 2 (two) times daily., Disp: , Rfl:     tretinoin (RETIN-A) 0.05 % cream, Apply topically nightly., Disp: , Rfl:     UNABLE TO FIND, Take by mouth once daily. Nutrafol, Disp: , Rfl:     ustekinumab (STELARA) 90 mg/mL Syrg syringe, Inject 1 mL (90 mg total) into the skin every 8 weeks., Disp: 1 each, Rfl: 2  Social History     Socioeconomic History    Marital status: Single   Tobacco Use    Smoking status: Never    Smokeless tobacco: Never   Substance and Sexual Activity    Alcohol use: Yes     Comment: Rarely    Drug use: No    Sexual activity: Yes     Partners: Male     Birth control/protection: Post-menopausal     Comment:          Last pap 11/9/2023  Last pathology:  Last ultrasound: Results for orders placed during the hospital encounter of 11/24/23    US Pelvis Comp with Transvag NON-OB (xpd    Narrative  EXAMINATION:  US PELVIS COMP WITH TRANSVAG NON-OB (XPD)    CLINICAL HISTORY:  Postmenopausal bleeding    TECHNIQUE:  Transabdominal sonography of the pelvis was performed, followed by transvaginal sonography to better evaluate the uterus and ovaries.  The examination was performed of a without direct on-site radiologist supervision.  Images were electronically transmitted for interpretation.    COMPARISON:  U/S pelvis 11/17/2016    FINDINGS:  Uterus:    Size: 9.2 x 4.6 x 5.5 cm    Masses: Multiple uterine fibroids: Two intramural fibroids in the body measuring 1.4 x 1.4 x 1.2 cm and 1.1 x 0.9 x 1.1 cm; intramural fibroid in the fundus measuring 1.4 x 1.4 x 1.3 cm.  Were not noted on previous examination.    Endometrium: Mildly thickened in this post menopausal patient, measuring the 6 mm.  In addition in the central area of the endometrium there appears be assessed a 7 or 8 mm relatively hypoechoic area which could represent an endometrial polyp or  submucosal lesion.  Further assessment with endoscopy or biopsy might be considered    Cervix: Unremarkable.    Right ovary: Not visualized.    Left ovary: Not visualized.    Free Fluid: None.    Impression  Mild thickening of the endometrium measuring 6 mm.  In addition there appears to be a 7 or 8 mm relatively hypoechoic uniform lesion in the mid aspect of the endometrium which could represent an endometrial polyp or submucosal lesion.  Further evaluation with hysteroscopy and/or biopsy might be considered.    Multiple uterine fibroids, as above.  These appear to be new since previous examination.    This report was flagged in Epic as abnormal.    Electronically signed by resident: Bacilio Marrufo  Date:    11/24/2023  Time:    13:34    Electronically signed by: Elmer Solorio MD  Date:    11/24/2023  Time:    13:59    Last labs:  Lab Results   Component Value Date    WBC 7.82 11/02/2023    HGB 14.1 11/02/2023    HCT 42.1 11/02/2023    MCV 97 11/02/2023     11/02/2023       There were no vitals filed for this visit.  General Appearance: Alert, appropriate appearance for age. No acute distress, Chest/Respiratory Exam: Normal chest wall and respirations. Clear to auscultation.   Cardiovascular Exam: regular rate and rhythm,   Gastrointestinal Exam: soft, non-tender; bowel sounds normal; no masses  Pelvic Exam Female: Exam deferred.   Psychiatric Exam: Alert and oriented, appropriate affect.    Assessment:   Endometrial polyp- 8mm  PMB      Plan: Hyst D&C polypectomy for 8mm polyp on 12/27    I have discussed the risks, benefits, indications, and alternatives of the procedure in detail.  The patient verbalizes her understanding.  All questions answered.  Consents signed in office    The patient agrees to proceed to proceed as planned.

## 2023-12-26 NOTE — H&P (VIEW-ONLY)
53 y.o.  presents for  Hysteroscopy  D&C polypectomy for 8mm polyp on      Doing well on Combi patch but had one episode pf spotting 2 mo ago   U/s done which showed an 8mm polyp     Patient's last menstrual period was 2018..    U/s Impression:   Mild thickening of the endometrium measuring 6 mm.  In addition there appears to be a 7 or 8 mm relatively hypoechoic uniform lesion in the mid aspect of the endometrium which could represent an endometrial polyp or submucosal lesion.  Further evaluation with hysteroscopy and/or biopsy might be considered.   Multiple uterine fibroids, as above.  These appear to be new since previous examination.       Past Medical History:   Diagnosis Date    Abnormal Pap smear of cervix     LEEP ,  LGSIL, colpo  mildly abnl cells,     Cataract     Chronic diarrhea     Glaucoma     Ulcerative colitis     proctitis     Past Surgical History:   Procedure Laterality Date    AUGMENTATION OF BREAST      implants were deflated by a doctor and will be removed 2022    BREAST SURGERY  ,     enlargement and lift of both breasts    BREAST SURGERY Bilateral 2022    implants removed    CATARACT EXTRACTION W/  INTRAOCULAR LENS IMPLANT Right 10/06/2021    DR.ELLEN BULLOCK    CATARACT EXTRACTION W/  INTRAOCULAR LENS IMPLANT Left     DR.ELLEN BULLOCK    CERVICAL BIOPSY  W/ LOOP ELECTRODE EXCISION  2016    Moderate dysplasia completely excised margins clear and ECC negative    COLONOSCOPY N/A 2022    Procedure: COLONOSCOPY;  Surgeon: Bran Breaux MD;  Location: [facility] ([address] [place] [address]);  Service: Endoscopy;  Laterality: N/A;  fully vaccinated 3/27/21, instructions sent to [facility]      COVID test at [place] on -GT    FLEXIBLE SIGMOIDOSCOPY N/A 02/15/2023    Procedure: SIGMOIDOSCOPY-FLEXIBLE;  Surgeon: Bran Breaux MD;  [facility]  Service: Endoscopy;  Laterality: N/A;  instr emailed -ml  Pre call done did  not answer 2/9/23-DB    MASTOPEXY Bilateral 09/27/2022    Procedure: MASTOPEXY FULL WITH IMPLANT BAG REMOVAL;  Surgeon: Armen Reynolds Jr., MD;  Location: [facility];  Service: Plastics;  Laterality: Bilateral;  2HRS    TOTAL REDUCTION MAMMOPLASTY  09/2022    lift procedure and implants removed     Family History   Problem Relation Age of Onset    Lung disease Father     Glaucoma Father     Diabetes Mother     Heart disease Mother     Lupus Sister     Asthma Sister     No Known Problems Sister     No Known Problems Sister     No Known Problems Son     No Known Problems Daughter     No Known Problems Daughter     Breast cancer Neg Hx     Colon cancer Neg Hx     Ovarian cancer Neg Hx     Cancer Neg Hx     Amblyopia Neg Hx     Blindness Neg Hx     Macular degeneration Neg Hx     Retinal detachment Neg Hx     Strabismus Neg Hx      Review of patient's allergies indicates:   Allergen Reactions    Penicillin Rash       Current Outpatient Medications:     ascorbic acid, vitamin C, (VITAMIN C) 100 MG tablet, Take 100 mg by mouth once daily., Disp: , Rfl:     cholecalciferol, vitamin D3, (VITAMIN D3 ORAL), Take by mouth once daily. Not sure, Disp: , Rfl:     clobetasoL (CLOBEX) 0.05 % shampoo, SMARTSIG:Topical 2-3 Times Weekly, Disp: , Rfl:     cycloSPORINE modified, NEORAL, (NEORAL) 50 MG capsule, Take 50 mg by mouth once daily., Disp: , Rfl:     dutasteride (AVODART) 0.5 mg capsule, Take 0.5 mg by mouth., Disp: , Rfl:     estradiol-norethindrone (COMBIPATCH) 0.05-0.14 mg/24 hr, Apply patch to clean skin and change twice weekly, Disp: 8 patch, Rfl: 11    fluocinolone and shower cap 0.01 % Oil, Apply topically every evening., Disp: , Rfl:     hydrOXYchloroQUINE (PLAQUENIL) 200 mg tablet, Take 200 mg by mouth 2 (two) times daily., Disp: , Rfl:     latanoprost 0.005 % ophthalmic solution, Place 1 drop into both eyes nightly., Disp: , Rfl:     LIALDA 1.2 gram TbEC, TAKE 4 TABLETS BY MOUTH DAILY WITH BREAKFAST., Disp: 120  tablet, Rfl: 5    minoxidiL (LONITEN) 2.5 MG tablet, Take by mouth., Disp: , Rfl:     timolol maleate 0.5% (TIMOPTIC) 0.5 % Drop, Place 1 drop into both eyes 2 (two) times daily., Disp: , Rfl:     tretinoin (RETIN-A) 0.05 % cream, Apply topically nightly., Disp: , Rfl:     UNABLE TO FIND, Take by mouth once daily. Nutrafol, Disp: , Rfl:     ustekinumab (STELARA) 90 mg/mL Syrg syringe, Inject 1 mL (90 mg total) into the skin every 8 weeks., Disp: 1 each, Rfl: 2  Social History     Socioeconomic History    Marital status: Single   Tobacco Use    Smoking status: Never    Smokeless tobacco: Never   Substance and Sexual Activity    Alcohol use: Yes     Comment: Rarely    Drug use: No    Sexual activity: Yes     Partners: Male     Birth control/protection: Post-menopausal     Comment:          Last pap 11/9/2023  Last pathology:  Last ultrasound: Results for orders placed during the hospital encounter of 11/24/23    US Pelvis Comp with Transvag NON-OB (xpd    Narrative  EXAMINATION:  US PELVIS COMP WITH TRANSVAG NON-OB (XPD)    CLINICAL HISTORY:  Postmenopausal bleeding    TECHNIQUE:  Transabdominal sonography of the pelvis was performed, followed by transvaginal sonography to better evaluate the uterus and ovaries.  The examination was performed of a without direct on-site radiologist supervision.  Images were electronically transmitted for interpretation.    COMPARISON:  U/S pelvis 11/17/2016    FINDINGS:  Uterus:    Size: 9.2 x 4.6 x 5.5 cm    Masses: Multiple uterine fibroids: Two intramural fibroids in the body measuring 1.4 x 1.4 x 1.2 cm and 1.1 x 0.9 x 1.1 cm; intramural fibroid in the fundus measuring 1.4 x 1.4 x 1.3 cm.  Were not noted on previous examination.    Endometrium: Mildly thickened in this post menopausal patient, measuring the 6 mm.  In addition in the central area of the endometrium there appears be assessed a 7 or 8 mm relatively hypoechoic area which could represent an endometrial polyp or  submucosal lesion.  Further assessment with endoscopy or biopsy might be considered    Cervix: Unremarkable.    Right ovary: Not visualized.    Left ovary: Not visualized.    Free Fluid: None.    Impression  Mild thickening of the endometrium measuring 6 mm.  In addition there appears to be a 7 or 8 mm relatively hypoechoic uniform lesion in the mid aspect of the endometrium which could represent an endometrial polyp or submucosal lesion.  Further evaluation with hysteroscopy and/or biopsy might be considered.    Multiple uterine fibroids, as above.  These appear to be new since previous examination.    This report was flagged in Epic as abnormal.    Electronically signed by resident: Bacilio Marrufo  Date:    11/24/2023  Time:    13:34    Electronically signed by: Elmer Solorio MD  Date:    11/24/2023  Time:    13:59    Last labs:  Lab Results   Component Value Date    WBC 7.82 11/02/2023    HGB 14.1 11/02/2023    HCT 42.1 11/02/2023    MCV 97 11/02/2023     11/02/2023       There were no vitals filed for this visit.  General Appearance: Alert, appropriate appearance for age. No acute distress, Chest/Respiratory Exam: Normal chest wall and respirations. Clear to auscultation.   Cardiovascular Exam: regular rate and rhythm,   Gastrointestinal Exam: soft, non-tender; bowel sounds normal; no masses  Pelvic Exam Female: Exam deferred.   Psychiatric Exam: Alert and oriented, appropriate affect.    Assessment:   Endometrial polyp- 8mm  PMB      Plan: Hyst D&C polypectomy for 8mm polyp on 12/27    I have discussed the risks, benefits, indications, and alternatives of the procedure in detail.  The patient verbalizes her understanding.  All questions answered.  Consents signed in office    The patient agrees to proceed to proceed as planned.

## 2023-12-27 ENCOUNTER — ANESTHESIA EVENT (OUTPATIENT)
Dept: SURGERY | Facility: HOSPITAL | Age: 53
End: 2023-12-27
Payer: COMMERCIAL

## 2023-12-27 ENCOUNTER — ANESTHESIA (OUTPATIENT)
Dept: SURGERY | Facility: HOSPITAL | Age: 53
End: 2023-12-27
Payer: COMMERCIAL

## 2023-12-27 ENCOUNTER — HOSPITAL ENCOUNTER (OUTPATIENT)
Facility: HOSPITAL | Age: 53
Discharge: HOME | End: 2023-12-27
Attending: OBSTETRICS & GYNECOLOGY | Admitting: OBSTETRICS & GYNECOLOGY
Payer: COMMERCIAL

## 2023-12-27 VITALS
RESPIRATION RATE: 19 BRPM | TEMPERATURE: 98 F | OXYGEN SATURATION: 99 % | HEART RATE: 100 BPM | DIASTOLIC BLOOD PRESSURE: 60 MMHG | SYSTOLIC BLOOD PRESSURE: 139 MMHG

## 2023-12-27 DIAGNOSIS — N84.0 UTERINE POLYP: Primary | ICD-10-CM

## 2023-12-27 PROCEDURE — 36000707: Performed by: OBSTETRICS & GYNECOLOGY

## 2023-12-27 PROCEDURE — 58558 PR HYSTEROSCOPY,W/ENDO BX: ICD-10-PCS | Mod: ,,, | Performed by: OBSTETRICS & GYNECOLOGY

## 2023-12-27 PROCEDURE — 94760 N-INVAS EAR/PLS OXIMETRY 1: CPT

## 2023-12-27 PROCEDURE — 71000033 HC RECOVERY, INTIAL HOUR: Performed by: OBSTETRICS & GYNECOLOGY

## 2023-12-27 PROCEDURE — 27201423 OPTIME MED/SURG SUP & DEVICES STERILE SUPPLY: Performed by: OBSTETRICS & GYNECOLOGY

## 2023-12-27 PROCEDURE — 25000003 PHARM REV CODE 250: Performed by: OBSTETRICS & GYNECOLOGY

## 2023-12-27 PROCEDURE — D9220A PRA ANESTHESIA: ICD-10-PCS | Mod: ,,, | Performed by: NURSE ANESTHETIST, CERTIFIED REGISTERED

## 2023-12-27 PROCEDURE — 63600175 PHARM REV CODE 636 W HCPCS: Performed by: NURSE ANESTHETIST, CERTIFIED REGISTERED

## 2023-12-27 PROCEDURE — 88305 TISSUE EXAM BY PATHOLOGIST: ICD-10-PCS | Mod: 26,,, | Performed by: PATHOLOGY

## 2023-12-27 PROCEDURE — 88305 TISSUE EXAM BY PATHOLOGIST: CPT | Mod: 59 | Performed by: PATHOLOGY

## 2023-12-27 PROCEDURE — 71000015 HC POSTOP RECOV 1ST HR: Performed by: OBSTETRICS & GYNECOLOGY

## 2023-12-27 PROCEDURE — D9220A PRA ANESTHESIA: Mod: ,,, | Performed by: NURSE ANESTHETIST, CERTIFIED REGISTERED

## 2023-12-27 PROCEDURE — 99900035 HC TECH TIME PER 15 MIN (STAT)

## 2023-12-27 PROCEDURE — 25000003 PHARM REV CODE 250: Performed by: NURSE ANESTHETIST, CERTIFIED REGISTERED

## 2023-12-27 PROCEDURE — C1782 MORCELLATOR: HCPCS | Performed by: OBSTETRICS & GYNECOLOGY

## 2023-12-27 PROCEDURE — 88305 TISSUE EXAM BY PATHOLOGIST: CPT | Mod: 26,,, | Performed by: PATHOLOGY

## 2023-12-27 PROCEDURE — 36000706: Performed by: OBSTETRICS & GYNECOLOGY

## 2023-12-27 PROCEDURE — 58558 HYSTEROSCOPY BIOPSY: CPT | Mod: ,,, | Performed by: OBSTETRICS & GYNECOLOGY

## 2023-12-27 PROCEDURE — 37000008 HC ANESTHESIA 1ST 15 MINUTES: Performed by: OBSTETRICS & GYNECOLOGY

## 2023-12-27 PROCEDURE — 37000009 HC ANESTHESIA EA ADD 15 MINS: Performed by: OBSTETRICS & GYNECOLOGY

## 2023-12-27 RX ORDER — FAMOTIDINE 20 MG/1
20 TABLET, FILM COATED ORAL
Status: COMPLETED | OUTPATIENT
Start: 2023-12-27 | End: 2023-12-27

## 2023-12-27 RX ORDER — PROCHLORPERAZINE EDISYLATE 5 MG/ML
5 INJECTION INTRAMUSCULAR; INTRAVENOUS EVERY 6 HOURS PRN
Status: DISCONTINUED | OUTPATIENT
Start: 2023-12-27 | End: 2023-12-27

## 2023-12-27 RX ORDER — SODIUM CHLORIDE 9 MG/ML
INJECTION, SOLUTION INTRAVENOUS CONTINUOUS
Status: ACTIVE | OUTPATIENT
Start: 2023-12-27

## 2023-12-27 RX ORDER — HYDROCODONE BITARTRATE AND ACETAMINOPHEN 5; 325 MG/1; MG/1
1 TABLET ORAL EVERY 4 HOURS PRN
Status: DISCONTINUED | OUTPATIENT
Start: 2023-12-27 | End: 2023-12-27 | Stop reason: HOSPADM

## 2023-12-27 RX ORDER — HYDROMORPHONE HYDROCHLORIDE 1 MG/ML
0.2 INJECTION, SOLUTION INTRAMUSCULAR; INTRAVENOUS; SUBCUTANEOUS EVERY 5 MIN PRN
Status: DISCONTINUED | OUTPATIENT
Start: 2023-12-27 | End: 2023-12-27 | Stop reason: HOSPADM

## 2023-12-27 RX ORDER — PROCHLORPERAZINE EDISYLATE 5 MG/ML
5 INJECTION INTRAMUSCULAR; INTRAVENOUS EVERY 30 MIN PRN
Status: DISCONTINUED | OUTPATIENT
Start: 2023-12-27 | End: 2023-12-27 | Stop reason: HOSPADM

## 2023-12-27 RX ORDER — HYDROMORPHONE HYDROCHLORIDE 1 MG/ML
1 INJECTION, SOLUTION INTRAMUSCULAR; INTRAVENOUS; SUBCUTANEOUS EVERY 4 HOURS PRN
Status: DISCONTINUED | OUTPATIENT
Start: 2023-12-27 | End: 2023-12-27 | Stop reason: HOSPADM

## 2023-12-27 RX ORDER — IBUPROFEN 800 MG/1
800 TABLET ORAL EVERY 8 HOURS PRN
Qty: 20 TABLET | Refills: 0 | Status: SHIPPED | OUTPATIENT
Start: 2023-12-27

## 2023-12-27 RX ORDER — OXYCODONE AND ACETAMINOPHEN 5; 325 MG/1; MG/1
1 TABLET ORAL
Status: DISCONTINUED | OUTPATIENT
Start: 2023-12-27 | End: 2023-12-27 | Stop reason: HOSPADM

## 2023-12-27 RX ORDER — ONDANSETRON 2 MG/ML
4 INJECTION INTRAMUSCULAR; INTRAVENOUS DAILY PRN
Status: DISCONTINUED | OUTPATIENT
Start: 2023-12-27 | End: 2023-12-27 | Stop reason: HOSPADM

## 2023-12-27 RX ORDER — ONDANSETRON 4 MG/1
8 TABLET, ORALLY DISINTEGRATING ORAL EVERY 8 HOURS PRN
Qty: 6 TABLET | Refills: 0 | Status: SHIPPED | OUTPATIENT
Start: 2023-12-27

## 2023-12-27 RX ORDER — LIDOCAINE HYDROCHLORIDE 20 MG/ML
INJECTION INTRAVENOUS
Status: DISCONTINUED | OUTPATIENT
Start: 2023-12-27 | End: 2023-12-27

## 2023-12-27 RX ORDER — PHENYLEPHRINE HYDROCHLORIDE 10 MG/ML
INJECTION INTRAVENOUS
Status: DISCONTINUED | OUTPATIENT
Start: 2023-12-27 | End: 2023-12-27

## 2023-12-27 RX ORDER — MIDAZOLAM HYDROCHLORIDE 1 MG/ML
INJECTION, SOLUTION INTRAMUSCULAR; INTRAVENOUS
Status: DISCONTINUED | OUTPATIENT
Start: 2023-12-27 | End: 2023-12-27

## 2023-12-27 RX ORDER — PROPOFOL 10 MG/ML
VIAL (ML) INTRAVENOUS
Status: DISCONTINUED | OUTPATIENT
Start: 2023-12-27 | End: 2023-12-27

## 2023-12-27 RX ORDER — ONDANSETRON 2 MG/ML
INJECTION INTRAMUSCULAR; INTRAVENOUS
Status: DISCONTINUED | OUTPATIENT
Start: 2023-12-27 | End: 2023-12-27

## 2023-12-27 RX ORDER — OXYCODONE AND ACETAMINOPHEN 5; 325 MG/1; MG/1
1 TABLET ORAL EVERY 4 HOURS PRN
Qty: 5 TABLET | Refills: 0 | Status: SHIPPED | OUTPATIENT
Start: 2023-12-27

## 2023-12-27 RX ORDER — ONDANSETRON 8 MG/1
8 TABLET, ORALLY DISINTEGRATING ORAL EVERY 8 HOURS PRN
Status: DISCONTINUED | OUTPATIENT
Start: 2023-12-27 | End: 2023-12-27

## 2023-12-27 RX ORDER — FENTANYL CITRATE 50 UG/ML
25 INJECTION, SOLUTION INTRAMUSCULAR; INTRAVENOUS EVERY 5 MIN PRN
Status: DISCONTINUED | OUTPATIENT
Start: 2023-12-27 | End: 2023-12-27 | Stop reason: HOSPADM

## 2023-12-27 RX ORDER — IBUPROFEN 200 MG
600 TABLET ORAL EVERY 6 HOURS PRN
Status: DISCONTINUED | OUTPATIENT
Start: 2023-12-27 | End: 2023-12-27 | Stop reason: HOSPADM

## 2023-12-27 RX ORDER — AMOXICILLIN 250 MG
1 CAPSULE ORAL DAILY
COMMUNITY
Start: 2023-12-27

## 2023-12-27 RX ORDER — DIPHENHYDRAMINE HYDROCHLORIDE 50 MG/ML
25 INJECTION INTRAMUSCULAR; INTRAVENOUS EVERY 4 HOURS PRN
Status: DISCONTINUED | OUTPATIENT
Start: 2023-12-27 | End: 2023-12-27 | Stop reason: HOSPADM

## 2023-12-27 RX ORDER — DEXAMETHASONE SODIUM PHOSPHATE 4 MG/ML
INJECTION, SOLUTION INTRA-ARTICULAR; INTRALESIONAL; INTRAMUSCULAR; INTRAVENOUS; SOFT TISSUE
Status: DISCONTINUED | OUTPATIENT
Start: 2023-12-27 | End: 2023-12-27

## 2023-12-27 RX ORDER — FENTANYL CITRATE 50 UG/ML
INJECTION, SOLUTION INTRAMUSCULAR; INTRAVENOUS
Status: DISCONTINUED | OUTPATIENT
Start: 2023-12-27 | End: 2023-12-27

## 2023-12-27 RX ORDER — DIPHENHYDRAMINE HCL 25 MG
25 CAPSULE ORAL EVERY 4 HOURS PRN
Status: DISCONTINUED | OUTPATIENT
Start: 2023-12-27 | End: 2023-12-27 | Stop reason: HOSPADM

## 2023-12-27 RX ADMIN — ONDANSETRON 4 MG: 2 INJECTION INTRAMUSCULAR; INTRAVENOUS at 07:12

## 2023-12-27 RX ADMIN — DEXAMETHASONE SODIUM PHOSPHATE 4 MG: 4 INJECTION INTRA-ARTICULAR; INTRALESIONAL; INTRAMUSCULAR; INTRAVENOUS; SOFT TISSUE at 07:12

## 2023-12-27 RX ADMIN — LIDOCAINE HYDROCHLORIDE 60 MG: 20 INJECTION INTRAVENOUS at 07:12

## 2023-12-27 RX ADMIN — SODIUM CHLORIDE: 0.9 INJECTION, SOLUTION INTRAVENOUS at 07:12

## 2023-12-27 RX ADMIN — MIDAZOLAM HYDROCHLORIDE 2 MG: 1 INJECTION, SOLUTION INTRAMUSCULAR; INTRAVENOUS at 07:12

## 2023-12-27 RX ADMIN — PHENYLEPHRINE HYDROCHLORIDE 50 MCG: 10 INJECTION INTRAVENOUS at 07:12

## 2023-12-27 RX ADMIN — FAMOTIDINE 20 MG: 20 TABLET ORAL at 05:12

## 2023-12-27 RX ADMIN — PROPOFOL 200 MG: 10 INJECTION, EMULSION INTRAVENOUS at 07:12

## 2023-12-27 RX ADMIN — FENTANYL CITRATE 25 MCG: 50 INJECTION, SOLUTION INTRAMUSCULAR; INTRAVENOUS at 07:12

## 2023-12-27 NOTE — PLAN OF CARE
Pt in preop bay 32, VSS, meds given and IV inserted. Pt denies any open wounds on body or the use of any weight loss injections. Pt needs an updated H&P and anesthesia consents, otherwise ready to roll.    Procedural consents verified with pt.

## 2023-12-27 NOTE — OP NOTE
[facility] ([facility])  OBGYN  Operative Note    SUMMARY     Date of Procedure: 12/27/2023     Procedure: Procedure(s) (LRB):  POLYPECTOMY, UTERUS, HYSTEROSCOPIC (N/A)       Surgeon: Estuardo Robles M.D.    Assisting Surgeon: None    Pre-Operative Diagnosis: Abnormal uterine bleeding due to endometrial polyp [N93.9, N84.0]    Post-Operative Diagnosis: Post-Op Diagnosis Codes:     * Abnormal uterine bleeding due to endometrial polyp [N93.9, N84.0]    Anesthesia: General    Technical Procedures Used: Hysteroscopic Polypectomy with D&C    Description of the Findings of the Procedure: Patient was taken to the operating room where general anesthesia was performed and found to be adequate. She was prepped and draped in the normal sterile fashion in the dorsal lithotomy position in sherrill stirrups. Bladder was drained with straight catheter. Time out was perfomed.    Weighted speculum placed in the posterior vagina and the anterior vagina was elevated with silva retractor. The anterior lip of the cervix was grasped with the single tooth tenaculum.   The dilators were used to dilate the cervix to 7 mm enough to allow introduction of the hysteroscope. Normal saline was the distension medium.The hysteroscope was introduced and a small flat sessile polyp was noted on posterior wall  was visualized.     Myosure  reach was then used to remove all polypoid tissue from the uterine cavity. The uterine cavity was noted to be free of polyps or other anomalies. Bilateral tubal ostia were visualized.  A D&C was performed.  All instruments were then removed from the vagina. The patient tolerated procedure well. Sponge lap and needle counts were correct x 2.    Fluid deficit was 100cc.    Resection time was 1 minutes.    Complications: No    Estimated Blood Loss (EBL): * No values recorded between 12/27/2023  7:26 AM and 12/27/2023  7:39 AM *           Specimens: 1. Uterine scrapings  2. Uterine polyp  Specimen (24h  ago, onward)       Start     Ordered    12/27/23 0729  Specimen to Pathology, Surgery Gynecology and Obstetrics  Once        Comments: Pre-op Diagnosis: Abnormal uterine bleeding due to endometrial polyp [N93.9, N84.0]Procedure(s):POLYPECTOMY, UTERUS, HYSTEROSCOPIC Number of specimens: 2Name of specimens: 1.Uterine Scrapings 2.Uterine Polyp     References:    Click here for ordering Quick Tip   Question Answer Comment   Procedure Type: Gynecology and Obstetrics    Specimen Class: Routine/Screening    Which provider would you like to cc? MARGARITA COLON    Release to patient Immediate        12/27/23 0296                            Condition: Good    Disposition: PACU - hemodynamically stable.    Attestation: There was no qualified resident available for this procedure.

## 2023-12-27 NOTE — TRANSFER OF CARE
Anesthesia Transfer of Care Note    Patient: Raiza Cherry    Procedure(s) Performed: Procedure(s) (LRB):  POLYPECTOMY, UTERUS, HYSTEROSCOPIC (N/A)    Patient location: PACU    Anesthesia Type: general    Transport from OR: Transported from OR on 6-10 L/min O2 by face mask with adequate spontaneous ventilation    Post pain: adequate analgesia    Post assessment: no apparent anesthetic complications and tolerated procedure well    Post vital signs: stable    Level of consciousness: responds to stimulation    Nausea/Vomiting: no nausea/vomiting    Complications: none    Transfer of care protocol was followed      Last vitals: Visit Vitals  BP (!) 120/58   Pulse 69   Temp 37.3 °C (99.1 °F) (Temporal)   Resp 18   LMP 04/06/2018   SpO2 100%   Breastfeeding No

## 2023-12-27 NOTE — DISCHARGE SUMMARY
[facility] ([facility])  Discharge Note  Short Stay    Procedure(s) (LRB):  POLYPECTOMY, UTERUS, HYSTEROSCOPIC (N/A)  [facility]  Discharge Summary  Gynecology      Admit Date: 12/27/2023    Discharge Date and Time: 12/27/2023    Attending Physician: Estuardo Robles MD     Discharge Provider: Estuardo Robles    Reason for Admission: Hysteroscopy D&C  Myosure polypectomy     Procedures Performed: Procedure(s) (LRB):  POLYPECTOMY, UTERUS, HYSTEROSCOPIC (N/A)    Hospital Course (synopsis of major diagnoses, care, treatment, and services provided during the course of the hospital stay): Patient was admitted for surgery. Following surgery she was transferred to the floor and underwent routine postoperative care. She tolerated po, ambulated without difficulty, and pain was well controlled. She remained afebrile throughout hospital course and her labs were stable. She was discharged to home in stable condition.      Consults: none    Significant Diagnostic Studies: Labs: CBC   Lab Results   Component Value Date    WBC 5.37 12/22/2023    HGB 13.5 12/22/2023    HCT 40.4 12/22/2023    MCV 96 12/22/2023     12/22/2023       Final Diagnoses: PMB, Uterine polyp      Discharged Condition: stable    Disposition: Home    Follow Up/Patient Instructions: 2-4 weeks    Medications:  Reconciled Home Medications:   Current Discharge Medication List        START taking these medications    Details   ibuprofen (ADVIL,MOTRIN) 800 MG tablet Take 1 tablet (800 mg total) by mouth every 8 (eight) hours as needed for Pain.  Qty: 20 tablet, Refills: 0      ondansetron (ZOFRAN-ODT) 4 MG TbDL Take 2 tablets (8 mg total) by mouth every 8 (eight) hours as needed.  Qty: 6 tablet, Refills: 0      oxyCODONE-acetaminophen (PERCOCET) 5-325 mg per tablet Take 1 tablet by mouth every 4 (four) hours as needed for Pain.  Qty: 5 tablet, Refills: 0    Comments: Quantity prescribed more than 7 day supply?  No      senna-docusate 8.6-50 mg (PERICOLACE) 8.6-50 mg per tablet Take 1 tablet by mouth once daily.           CONTINUE these medications which have NOT CHANGED    Details   cycloSPORINE modified, NEORAL, (NEORAL) 50 MG capsule Take 50 mg by mouth once daily.      estradiol-norethindrone (COMBIPATCH) 0.05-0.14 mg/24 hr Apply patch to clean skin and change twice weekly  Qty: 8 patch, Refills: 11    Associated Diagnoses: Menopausal hot flushes      hydrOXYchloroQUINE (PLAQUENIL) 200 mg tablet Take 200 mg by mouth 2 (two) times daily.      latanoprost 0.005 % ophthalmic solution Place 1 drop into both eyes nightly.      LIALDA 1.2 gram TbEC TAKE 4 TABLETS BY MOUTH DAILY WITH BREAKFAST.  Qty: 120 tablet, Refills: 5    Associated Diagnoses: Ulcerative rectosigmoiditis without complication      minoxidiL (LONITEN) 2.5 MG tablet Take by mouth.      timolol maleate 0.5% (TIMOPTIC) 0.5 % Drop Place 1 drop into both eyes 2 (two) times daily.      tretinoin (RETIN-A) 0.05 % cream Apply topically nightly.      ascorbic acid, vitamin C, (VITAMIN C) 100 MG tablet Take 100 mg by mouth once daily.      cholecalciferol, vitamin D3, (VITAMIN D3 ORAL) Take by mouth once daily. Not sure      clobetasoL (CLOBEX) 0.05 % shampoo SMARTSIG:Topical 2-3 Times Weekly      dutasteride (AVODART) 0.5 mg capsule Take 0.5 mg by mouth.      fluocinolone and shower cap 0.01 % Oil Apply topically every evening.      UNABLE TO FIND Take by mouth once daily. Nutrafol      ustekinumab (STELARA) 90 mg/mL Syrg syringe Inject 1 mL (90 mg total) into the skin every 8 weeks.  Qty: 1 each, Refills: 2    Associated Diagnoses: Ulcerative rectosigmoiditis with rectal bleeding           Discharge Procedure Orders   Diet general     Pelvic Rest     Call MD for:  temperature >100.4     Call MD for:  persistent nausea and vomiting     Call MD for:  severe uncontrolled pain     Call MD for:  redness, tenderness, or signs of infection (pain, swelling, redness, odor or  green/yellow discharge around incision site)     Call MD for:  persistent dizziness or light-headedness     Activity as tolerated

## 2023-12-27 NOTE — PLAN OF CARE
Pt AAOx3, VSS on room air.Pt tolerated procedure well. Pt denies any pain, Dizziness or N/V. IV removed with catheter tip intact. Assisted up for the first time, steady on feet. Pt Discharge instructions given and explained to patient and family with verbalization of understanding all instructions. Pt denies any further questions at this time. Pt DC'd home VIA wheelchair with family.

## 2023-12-27 NOTE — ANESTHESIA PREPROCEDURE EVALUATION
12/27/2023  Raiza Cherry is a 53 y.o., female.  Past Medical History:   Diagnosis Date    Abnormal Pap smear of cervix     LEEP 2016, 9-2017 LGSIL, colpo  mildly abnl cells,     Cataract     Chronic diarrhea     Glaucoma     Ulcerative colitis     proctitis     Past Surgical History:   Procedure Laterality Date    AUGMENTATION OF BREAST      implants were deflated by a doctor and will be removed 9/2022    BREAST SURGERY  2014, 2018    enlargement and lift of both breasts    BREAST SURGERY Bilateral 09/2022    implants removed    CATARACT EXTRACTION W/  INTRAOCULAR LENS IMPLANT Right 10/06/2021    DR.ELLEN BULLOCK    CATARACT EXTRACTION W/  INTRAOCULAR LENS IMPLANT Left 2013    DR.ELLEN BULLOCK    CERVICAL BIOPSY  W/ LOOP ELECTRODE EXCISION  02/2016    Moderate dysplasia completely excised margins clear and ECC negative    COLONOSCOPY N/A 01/19/2022    Procedure: COLONOSCOPY;  Surgeon: Bran Breaux MD;  Location: [facility] (4TH FLR);  Service: Endoscopy;  Laterality: N/A;  fully vaccinated 3/27/21, instructions sent to [facility]      COVID test at [place] on 1/16-GT    FLEXIBLE SIGMOIDOSCOPY N/A 02/15/2023    Procedure: SIGMOIDOSCOPY-FLEXIBLE;  Surgeon: Bran Breaux MD;  Location: [facility] (4TH FLR);  Service: Endoscopy;  Laterality: N/A;  instr emailed -ml  Pre call done did not answer 2/9/23-DB    MASTOPEXY Bilateral 09/27/2022    Procedure: MASTOPEXY FULL WITH IMPLANT BAG REMOVAL;  Surgeon: Armen Reynolds Jr., MD;  Location: [facility];  Service: Plastics;  Laterality: Bilateral;  2HRS    TOTAL REDUCTION MAMMOPLASTY  09/2022    lift procedure and implants removed         Pre-op Assessment    I have reviewed the Patient Summary Reports.     I have reviewed the Nursing Notes. I have reviewed the NPO Status.   I have reviewed the Medications.     Review of  Systems  Anesthesia Hx:  No problems with previous Anesthesia             Denies Family Hx of Anesthesia complications.    Denies Personal Hx of Anesthesia complications.                    Hematology/Oncology:  Hematology Normal   Oncology Normal                                   EENT/Dental:  EENT/Dental Normal           Cardiovascular:  Cardiovascular Normal                                            Pulmonary:  Pulmonary Normal                       Renal/:  Renal/ Normal                 Hepatic/GI:  Bowel Prep. PUD,     Ulcerative collitis          Musculoskeletal:  Musculoskeletal Normal                Neurological:  Neurology Normal                                      Endocrine:  Endocrine Normal            Dermatological:  Skin Normal    Psych:  Psychiatric Normal                    Physical Exam  General: Well nourished    Airway:  Mallampati: II   Mouth Opening: Normal  TM Distance: Normal  Tongue: Normal  Neck ROM: Normal ROM    Dental:  Intact        Anesthesia Plan  Type of Anesthesia, risks & benefits discussed:    Anesthesia Type: Gen Supraglottic Airway, Gen ETT  Intra-op Monitoring Plan: Standard ASA Monitors  Informed Consent: Informed consent signed with the Patient and all parties understand the risks and agree with anesthesia plan.  All questions answered.   ASA Score: 2  Day of Surgery Review of History & Physical: H&P Update referred to the surgeon/provider.I have interviewed and examined the patient. I have reviewed the patient's H&P dated: There are no significant changes.     Ready For Surgery From Anesthesia Perspective.     .

## 2023-12-27 NOTE — ANESTHESIA PROCEDURE NOTES
Intubation    Date/Time: 12/27/2023 7:16 AM    Performed by: Vilma Larose CRNA  Authorized by: Jayy Metzger MD    Intubation:     Induction:  Intravenous    Intubated:  Postinduction    Mask Ventilation:  Easy mask    Attempts:  1    Attempted By:  CRNA    Difficult Airway Encountered?: No      Complications:  None    Airway Device:  Supraglottic airway/LMA    Airway Device Size:  4.0    Placement Verified By:  Capnometry    Complicating Factors:  None    Findings Post-Intubation:  BS equal bilateral and atraumatic/condition of teeth unchanged

## 2023-12-27 NOTE — ANESTHESIA POSTPROCEDURE EVALUATION
Anesthesia Post Evaluation    Patient: Raiza Cherry    Procedure(s) Performed: Procedure(s) (LRB):  POLYPECTOMY, UTERUS, HYSTEROSCOPIC (N/A)    Final Anesthesia Type: general      Patient location during evaluation: PACU  Patient participation: Yes- Able to Participate  Level of consciousness: awake and alert  Post-procedure vital signs: reviewed and stable  Pain management: adequate  Airway patency: patent    PONV status at discharge: No PONV  Anesthetic complications: no      Cardiovascular status: stable  Respiratory status: spontaneous ventilation  Hydration status: euvolemic  Follow-up not needed.              Vitals Value Taken Time   /60 12/27/23 0834   Temp 36.5 °C (97.7 °F) 12/27/23 0753   Pulse 68 12/27/23 0834   Resp 19 12/27/23 0832   SpO2 100 % 12/27/23 0834   Vitals shown include unvalidated device data.      Event Time   Out of Recovery 08:30:00         Pain/Kourtney Score: Kourtney Score: 10 (12/27/2023  8:32 AM)

## 2023-12-28 ENCOUNTER — PATIENT MESSAGE (OUTPATIENT)
Dept: OBSTETRICS AND GYNECOLOGY | Facility: CLINIC | Age: 53
End: 2023-12-28
Payer: COMMERCIAL

## 2023-12-31 LAB
FINAL PATHOLOGIC DIAGNOSIS: NORMAL
GROSS: NORMAL
Lab: NORMAL

## 2024-01-19 ENCOUNTER — PATIENT MESSAGE (OUTPATIENT)
Dept: OBSTETRICS AND GYNECOLOGY | Facility: CLINIC | Age: 54
End: 2024-01-19
Payer: COMMERCIAL

## 2024-02-15 ENCOUNTER — LAB VISIT (OUTPATIENT)
Dept: LAB | Facility: HOSPITAL | Age: 54
End: 2024-02-15
Attending: INTERNAL MEDICINE
Payer: COMMERCIAL

## 2024-02-15 DIAGNOSIS — K51.311 ULCERATIVE RECTOSIGMOIDITIS WITH RECTAL BLEEDING: ICD-10-CM

## 2024-02-15 PROCEDURE — 83993 ASSAY FOR CALPROTECTIN FECAL: CPT | Performed by: INTERNAL MEDICINE

## 2024-02-19 ENCOUNTER — PATIENT MESSAGE (OUTPATIENT)
Dept: GASTROENTEROLOGY | Facility: CLINIC | Age: 54
End: 2024-02-19
Payer: COMMERCIAL

## 2024-02-19 LAB — CALPROTECTIN STL-MCNT: 110 MCG/G

## 2024-02-25 DIAGNOSIS — K51.30 ULCERATIVE RECTOSIGMOIDITIS WITHOUT COMPLICATION: ICD-10-CM

## 2024-02-26 ENCOUNTER — TELEPHONE (OUTPATIENT)
Dept: GASTROENTEROLOGY | Facility: CLINIC | Age: 54
End: 2024-02-26
Payer: COMMERCIAL

## 2024-02-26 RX ORDER — MESALAMINE 1.2 G/1
TABLET, DELAYED RELEASE ORAL
Qty: 120 TABLET | Refills: 3 | Status: SHIPPED | OUTPATIENT
Start: 2024-02-26

## 2024-02-29 ENCOUNTER — TELEPHONE (OUTPATIENT)
Dept: GASTROENTEROLOGY | Facility: CLINIC | Age: 54
End: 2024-02-29
Payer: COMMERCIAL

## 2024-02-29 NOTE — TELEPHONE ENCOUNTER
Called patient and rescheduled for 3/6 @ 2 Pm virtually    ----- Message from Ave Stoddard sent at 2/29/2024  3:58 PM CST -----  Regarding: Appt  Contact: pt  [phone]  Missed Callback    Pt returning callback from missed call. Requesting to speak with somebody in Dr. Breaux's office. Please call pt @[phone]

## 2024-03-06 ENCOUNTER — OFFICE VISIT (OUTPATIENT)
Dept: GASTROENTEROLOGY | Facility: CLINIC | Age: 54
End: 2024-03-06
Payer: COMMERCIAL

## 2024-03-06 ENCOUNTER — TELEPHONE (OUTPATIENT)
Dept: ENDOSCOPY | Facility: HOSPITAL | Age: 54
End: 2024-03-06
Payer: COMMERCIAL

## 2024-03-06 VITALS — BODY MASS INDEX: 19.66 KG/M2 | WEIGHT: 118 LBS | HEIGHT: 65 IN

## 2024-03-06 DIAGNOSIS — L65.9 HAIR LOSS: ICD-10-CM

## 2024-03-06 DIAGNOSIS — K51.311 ULCERATIVE RECTOSIGMOIDITIS WITH RECTAL BLEEDING: Primary | ICD-10-CM

## 2024-03-06 DIAGNOSIS — D84.821 IMMUNOSUPPRESSION DUE TO DRUG THERAPY: ICD-10-CM

## 2024-03-06 DIAGNOSIS — Z79.899 IMMUNOSUPPRESSION DUE TO DRUG THERAPY: ICD-10-CM

## 2024-03-06 PROCEDURE — 99214 OFFICE O/P EST MOD 30 MIN: CPT | Mod: 95,,, | Performed by: INTERNAL MEDICINE

## 2024-03-06 PROCEDURE — 1159F MED LIST DOCD IN RCRD: CPT | Mod: CPTII,95,, | Performed by: INTERNAL MEDICINE

## 2024-03-06 PROCEDURE — 3008F BODY MASS INDEX DOCD: CPT | Mod: CPTII,95,, | Performed by: INTERNAL MEDICINE

## 2024-03-06 PROCEDURE — 1160F RVW MEDS BY RX/DR IN RCRD: CPT | Mod: CPTII,95,, | Performed by: INTERNAL MEDICINE

## 2024-03-06 PROCEDURE — G2211 COMPLEX E/M VISIT ADD ON: HCPCS | Mod: 95,,, | Performed by: INTERNAL MEDICINE

## 2024-03-06 NOTE — PATIENT INSTRUCTIONS
1. Continue Stelara  2. Labs before dose on April 12  3. Colonoscopy in late May  4. Follow-up in 6 months  5. Stop Lialda

## 2024-03-06 NOTE — PROGRESS NOTES
[facility]          Inflammatory Bowel Disease Follow Up Consultation Note         TODAY'S VISIT DATE:  3/6/2024    Reason for Consult:    Chief Complaint   Patient presents with    Ulcerative Colitis       PCP: Estuardo Robles      Referring MD:   No ref. provider found    History of Present Illness:  Raiza Cherry who is a 54 y.o. female is being seen today at the [facility] on 03/06/2024 for inflammatory bowel disease- ulcerative colitis.  Following our last visit she started Stelara.  Her 1st dose was October 30, 2023.  She did her 1st injection on December 22 and did her most recent injection on February 16, 2024.  A follow-up stool calprotectin level in February showed a major decline in her inflammatory markers from over 2000 to about 110.  She is still having about 3 bowel movements a day.  She rarely has any blood in it and has not seen any for awhile.  She no longer has significant abdominal cramping.  She sometimes will go up to 5 times daily.  She has a decrease in urgency.  She has also noticed that her hair is growing back finally.  Her next dose is scheduled for April 12.  She denies any problems with her Stelara injections.  She continues to take Lialda 4.8 g daily.      IBD History:  She has a history of left-sided ulcerative colitis.  This was diagnosed around 1990.  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  She has been managed with multiple 5 ASA products (both oral and rectal) as well as prednisone (tolerated very poorly) and Entocort. Her last colonoscopy was in October 2018 at which time there was some mild proctitis and some atrophy in the left colon.  Earlier this year she saw Dr. Gil because of active symptoms.  She started her on Lialda 4.8 g daily and Canasa  suppositories twice daily initially.  After she was doing better these were reduced down to once daily.  Eventually she stop the Canasa suppositories and was only taking Lialda 4.8 g daily.  She noted a significant increase in her symptoms after being off of the suppositories.  In January 2022 she underwent colonoscopy that showed active inflammation of the left colon.  At that time she reported that she was not taking Lialda for the last few months and was only taking Canasa suppositories.  She started taking Lialda 4.8 g daily and Canasa suppositories daily on a consistent basis in early 2022. A stool calprotectin level in early May 2022 was markedly elevated.  She did not submit a sample prior to starting the Lialda so I do not have a baseline to compare to.  A repeat sigmoidoscopy was done in early 2023 that showed ongoing disease activity.  We discussed possibly starting Zeposia but due to a left bundle branch block that was found on her EKG we decided against this.  We discussed starting Stelara but she decided to focus on treating her hair loss issues.  She finally started Stelara October 30, 2023.  A follow-up stool calprotectin after she had been on therapy for almost 4 months showed a major declined to 110.    IBD Details:  Dx Date:  1990  Disease type/distribution:  Ulcerative colitis/left colon disease extending to the descending colon  Current Treatment:  Stelara 90 mg every 8 weeks/ Lialda 4.8 g daily  Start Date:  October 30, 2023/July 2020  Response:  Biochemical improvement  Optimized:  No  Adverse reactions:  None  Prior surgeries:  None  CRP Elevation:  No  calprotectin: Elevated with active disease  Disease Complications:  None  Extraintestinal manifestations:  Hair loss  Prior treatments:   Steroids:  Good response  5ASA:  Incomplete response  IMM:  None  TNF Inh:  None   Anti-Integrin:  None   IL 12/23:  Stelara-current medication  ELIZABETH Inh:  None    Previous Clinical Trials:  None    Last  "Colonoscopy:   February 2023-flex sig-active left-sided colitis  January 2022-inflammation in the rectum, sigmoid, descending colon, otherwise normal     October 2018-rectal inflammation, atrophy of the left colon, otherwise normal    Other Endoscopies:  None    Imaging:   MRE:  None   CT:  None   Other:  None    Pertinent Labs:  Lab Results   Component Value Date    SEDRATE 6 06/12/2019    CRP 0.6 03/02/2023     Lab Results   Component Value Date    TTGIGA 5 07/29/2022     07/29/2022     Lab Results   Component Value Date    TSH 1.827 11/02/2023    FREET4 1.03 11/02/2023     Lab Results   Component Value Date    EOJKJTKG46HI 62 03/02/2023    EYVIDBKR20 >2000 (H) 07/29/2022     Lab Results   Component Value Date    HEPBSAG Non-reactive 03/02/2023    HEPBCAB Non-reactive 03/02/2023    HEPCAB Negative 10/13/2021     Lab Results   Component Value Date    KGY03MBSZ Negative 10/13/2021     Lab Results   Component Value Date    NIL 0.25627 03/14/2023    MITOGENNIL 9.929 03/14/2023     Lab Results   Component Value Date    TPTMINTERP SEE BELOW 10/13/2021     Lab Results   Component Value Date    CDIFFICILEAN Negative 07/21/2020    CDIFFTOX Negative 07/21/2020     Lab Results   Component Value Date    CALPROTECTIN 110.0 (H) 02/15/2024       Therapeutic Drug Monitoring Labs:  No results found for: "PROMETH"  No results found for: "ANSADAINIT", "INFLIXIMAB", "INFLIXINTERP"    Vaccinations:  Lab Results   Component Value Date    HEPBSAB Negative 10/13/2021     Lab Results   Component Value Date    HEPAIGG Negative 10/13/2021     Lab Results   Component Value Date    VARICELLAZOS 4.03 (H) 10/13/2021    VARICELLAINT Positive (A) 10/13/2021     Immunization History   Administered Date(s) Administered    COVID-19, MRNA, LN-S, PF (Pfizer) (Purple Cap) 03/06/2021, 03/27/2021, 12/04/2021    Influenza 09/30/2019, 10/01/2020, 11/01/2021, 09/07/2023    Pneumococcal Conjugate - 13 Valent 10/15/2020    Pneumococcal Polysaccharide - " 23 Valent 02/04/2022    Tdap 09/23/2021    Zoster Recombinant 02/04/2022, 04/11/2022         Review of Systems  Review of Systems   Constitutional:  Negative for chills, fever and weight loss.   HENT:  Negative for sore throat.    Eyes:  Negative for pain, discharge and redness.   Respiratory:  Negative for cough, shortness of breath and wheezing.    Cardiovascular:  Negative for chest pain, orthopnea and leg swelling.   Gastrointestinal:  Negative for abdominal pain, blood in stool, constipation, diarrhea, heartburn, melena, nausea and vomiting.   Genitourinary:  Negative for dysuria, frequency and urgency.   Musculoskeletal:  Negative for back pain, joint pain and myalgias.   Skin:  Negative for itching and rash.        Hair loss   Neurological:  Negative for focal weakness and seizures.   Endo/Heme/Allergies:  Does not bruise/bleed easily.   Psychiatric/Behavioral:  Negative for depression. The patient is not nervous/anxious.             Medical History:   Past Medical History:   Diagnosis Date    Abnormal Pap smear of cervix     LEEP 2016, 9-2017 LGSIL, colpo  mildly abnl cells,     Cataract     Chronic diarrhea     Glaucoma     Ulcerative colitis     proctitis       Surgical History:  Past Surgical History:   Procedure Laterality Date    AUGMENTATION OF BREAST      implants were deflated by a doctor and will be removed 9/2022    BREAST SURGERY  2014, 2018    enlargement and lift of both breasts    BREAST SURGERY Bilateral 09/2022    implants removed    CATARACT EXTRACTION W/  INTRAOCULAR LENS IMPLANT Right 10/06/2021    DR.ELLEN BULLOCK    CATARACT EXTRACTION W/  INTRAOCULAR LENS IMPLANT Left 2013    DR.ELLEN BULLOCK    CERVICAL BIOPSY  W/ LOOP ELECTRODE EXCISION  02/2016    Moderate dysplasia completely excised margins clear and ECC negative    COLONOSCOPY N/A 01/19/2022    Procedure: COLONOSCOPY;  Surgeon: Bran Breaux MD;  Location: [facility] ([address] [place] [address]);  Service: Endoscopy;  Laterality:  N/A;  fully vaccinated 3/27/21, instructions sent to [facility]      COVID test at [place] on 1/16-GT    FLEXIBLE SIGMOIDOSCOPY N/A 02/15/2023    Procedure: SIGMOIDOSCOPY-FLEXIBLE;  Surgeon: Bran Breaux MD;  Location: [facility] ENDO (4TH FLR);  Service: Endoscopy;  Laterality: N/A;  instr emailed -ml  Pre call done did not answer 2/9/23-DB    HYSTEROSCOPIC POLYPECTOMY OF UTERUS N/A 12/27/2023    Procedure: POLYPECTOMY, UTERUS, HYSTEROSCOPIC;  Surgeon: Estuardo Robles MD;  Location: [facility] OR;  Service: OB/GYN;  Laterality: N/A;  Deficit-75  Total- 150    MASTOPEXY Bilateral 09/27/2022    Procedure: MASTOPEXY FULL WITH IMPLANT BAG REMOVAL;  Surgeon: Armen Reynolds Jr., MD;  Location: [facility] OR;  Service: Plastics;  Laterality: Bilateral;  2HRS    TOTAL REDUCTION MAMMOPLASTY  09/2022    lift procedure and implants removed       Family History:   Family History   Problem Relation Age of Onset    Lung disease Father     Glaucoma Father     Diabetes Mother     Heart disease Mother     Lupus Sister     Asthma Sister     No Known Problems Sister     No Known Problems Sister     No Known Problems Son     No Known Problems Daughter     No Known Problems Daughter     Breast cancer Neg Hx     Colon cancer Neg Hx     Ovarian cancer Neg Hx     Cancer Neg Hx     Amblyopia Neg Hx     Blindness Neg Hx     Macular degeneration Neg Hx     Retinal detachment Neg Hx     Strabismus Neg Hx        Social History:   Social History     Tobacco Use    Smoking status: Never    Smokeless tobacco: Never   Substance Use Topics    Alcohol use: Yes     Comment: Rarely    Drug use: No       Allergies: Reviewed    Home Medications:   Medication List with Changes/Refills   Current Medications    ASCORBIC ACID, VITAMIN C, (VITAMIN C) 100 MG TABLET    Take 100 mg by mouth once daily.    CHOLECALCIFEROL, VITAMIN D3, (VITAMIN D3 ORAL)    Take by mouth once daily. Not sure    CLOBETASOL (CLOBEX) 0.05 % SHAMPOO    SMARTSIG:Topical 2-3 Times Weekly  "   CYCLOSPORINE MODIFIED, NEORAL, (NEORAL) 50 MG CAPSULE    Take 50 mg by mouth once daily.    DUTASTERIDE (AVODART) 0.5 MG CAPSULE    Take 0.5 mg by mouth.    ESTRADIOL-NORETHINDRONE (COMBIPATCH) 0.05-0.14 MG/24 HR    Apply patch to clean skin and change twice weekly    FLUOCINOLONE AND SHOWER CAP 0.01 % OIL    Apply topically every evening.    HYDROXYCHLOROQUINE (PLAQUENIL) 200 MG TABLET    Take 200 mg by mouth 2 (two) times daily.    IBUPROFEN (ADVIL,MOTRIN) 800 MG TABLET    Take 1 tablet (800 mg total) by mouth every 8 (eight) hours as needed for Pain.    LATANOPROST 0.005 % OPHTHALMIC SOLUTION    Place 1 drop into both eyes nightly.    MESALAMINE (LIALDA) 1.2 GRAM TBEC    TAKE 4 TABLETS BY MOUTH DAILY WITH BREAKFAST.    MINOXIDIL (LONITEN) 2.5 MG TABLET    Take by mouth.    ONDANSETRON (ZOFRAN-ODT) 4 MG TBDL    Dissolve 2 tablets (8 mg total) on the tongue every 8 (eight) hours as needed.    OXYCODONE-ACETAMINOPHEN (PERCOCET) 5-325 MG PER TABLET    Take 1 tablet by mouth every 4 (four) hours as needed for Pain.    SENNA-DOCUSATE 8.6-50 MG (PERICOLACE) 8.6-50 MG PER TABLET    Take 1 tablet by mouth once daily.    TIMOLOL MALEATE 0.5% (TIMOPTIC) 0.5 % DROP    Place 1 drop into both eyes 2 (two) times daily.    TRETINOIN (RETIN-A) 0.05 % CREAM    Apply topically nightly.    UNABLE TO FIND    Take by mouth once daily. Nutrafol    USTEKINUMAB (STELARA) 90 MG/ML SYRG SYRINGE    Inject 1 mL (90 mg total) into the skin every 8 weeks.       Physical Exam:  Vital Signs:  Ht 5' 5" (1.651 m)   Wt 53.5 kg (118 lb)   LMP 04/06/2018   BMI 19.64 kg/m²   Body mass index is 19.64 kg/m².    Physical Exam  Constitutional:       General: She is not in acute distress.     Appearance: Normal appearance. She is not ill-appearing.   Neurological:      General: No focal deficit present.      Mental Status: She is alert and oriented to person, place, and time.   Psychiatric:         Mood and Affect: Mood normal.         Behavior: " Behavior normal.         Thought Content: Thought content normal.         Judgment: Judgment normal.         Labs: reviewed and pertinent noted above    Assessment/Plan:  Raiza Cherry is a 54 y.o. female with left-sided ulcerative colitis. The following issues were addresssed:    1. Ulcerative rectosigmoiditis with rectal bleeding    2. Hair loss      1.  Ulcerative colitis:  Overall she seems to be doing pretty well.  She does have some increase in stool frequency but it is essentially at baseline.  Her calprotectin improvement has been dramatic and I suspect that the Stelara is probably helping quite a bit.  We will plan to check a Stelara level before her next dose and we will also arrange for colonoscopy in the next few months to reassess her disease activity.  If there is evidence of active disease we will optimize Stelara dosing if needed.    2. Hair loss:  Improving significantly since starting Stelara.  Suspect hair loss is related to active ulcerative colitis for the last several years.    3. Immunosuppression:  Flu pneumonia and shingles vaccines up-to-date        # IBD specific health maintenance:  Colon cancer surveillance:  Up-to-date    Annual:  - Eye exam:  Not applicable  - Skin exam (if on IMM/TNF):  Up-to-date  - reminded pt to use sunblock/hats/sunprotective clothing  - PAP (if immunosuppressed):  June 2021    DEXA:  Not applicable    Vitamin D:  Normal    Vaccines:    Influenza:  Up-to-date   Pneumovax:     PCV13:  Received    PSV23:  Up-to-date   HAV:  Discuss in the future   HBV:  Discuss in the   Tdap:  Needs to be updated   MMR:  Immune   VZV:  Immune   HZV:  Up-to-date   HPV:  Not applicable   Meningococcus:  Not applicable   COVID:  Completed series and booster      Follow up: Follow up in about 6 months (around 9/6/2024).    Visit today is associated with current or anticipated ongoing medical care related to this patient's single serious condition/complex condition (ulcerative  colitis).      Thank you again for sending Raiza Cherry to see Dr. Shakir Breaux today at the [facility]. Please don't hesitate to contact Dr. Breaux if there are any questions regarding this evaluation, or if you have any other patients with inflammatory bowel disease for whom you would like a consultation. You can reach Dr. Breaux at [phone] or by email at [email or web address]    Bran Breaux MD  Department of Gastroenterology  Inflammatory Bowel Disease

## 2024-03-06 NOTE — TELEPHONE ENCOUNTER
"Contacted the patient to schedule an endoscopy procedure(s) colonoscopy. The patient did not answer the call and left a voice message requesting a call back.        Silvia Gaffney RN Craft, [place], MA  Caller: Unspecified (Today,  2:37 PM)  Procedure: Colonoscopy    Diagnosis: UC    Procedure Timin-12 weeks Late May    *If within 4 weeks selected, please krystian as high priority*    *If greater than 12 weeks, please select "5-12 weeks" and delay sending until 2 months prior to requested date*    Provider: Dr. Breaux    Location: [address]    Additional Scheduling Information: No scheduling concerns    Prep Specifications:Suprep    Have you attached a patient to this message: yes  "

## 2024-03-07 ENCOUNTER — TELEPHONE (OUTPATIENT)
Dept: ENDOSCOPY | Facility: HOSPITAL | Age: 54
End: 2024-03-07
Payer: COMMERCIAL

## 2024-03-13 ENCOUNTER — TELEPHONE (OUTPATIENT)
Dept: ENDOSCOPY | Facility: HOSPITAL | Age: 54
End: 2024-03-13
Payer: COMMERCIAL

## 2024-03-27 ENCOUNTER — CLINICAL SUPPORT (OUTPATIENT)
Dept: OTHER | Facility: CLINIC | Age: 54
End: 2024-03-27

## 2024-03-27 DIAGNOSIS — Z00.8 ENCOUNTER FOR OTHER GENERAL EXAMINATION: ICD-10-CM

## 2024-03-28 VITALS
DIASTOLIC BLOOD PRESSURE: 78 MMHG | BODY MASS INDEX: 19.66 KG/M2 | SYSTOLIC BLOOD PRESSURE: 122 MMHG | HEIGHT: 65 IN | WEIGHT: 118 LBS

## 2024-03-28 LAB
GLUCOSE SERPL-MCNC: 75 MG/DL (ref 60–140)
HDLC SERPL-MCNC: 66 MG/DL
POC CHOLESTEROL, LDL (DOCK): 159 MG/DL
POC CHOLESTEROL, TOTAL: 233 MG/DL
TRIGL SERPL-MCNC: 47 MG/DL

## 2024-04-04 ENCOUNTER — TELEPHONE (OUTPATIENT)
Dept: ENDOSCOPY | Facility: HOSPITAL | Age: 54
End: 2024-04-04
Payer: COMMERCIAL

## 2024-05-14 DIAGNOSIS — K51.311 ULCERATIVE RECTOSIGMOIDITIS WITH RECTAL BLEEDING: ICD-10-CM

## 2024-05-14 RX ORDER — USTEKINUMAB 90 MG/ML
INJECTION, SOLUTION SUBCUTANEOUS
Qty: 1 ML | Refills: 3 | Status: SHIPPED | OUTPATIENT
Start: 2024-05-14 | End: 2024-06-19 | Stop reason: DRUGHIGH

## 2024-05-15 ENCOUNTER — PATIENT MESSAGE (OUTPATIENT)
Dept: OBSTETRICS AND GYNECOLOGY | Facility: CLINIC | Age: 54
End: 2024-05-15
Payer: COMMERCIAL

## 2024-05-16 ENCOUNTER — OFFICE VISIT (OUTPATIENT)
Dept: OBSTETRICS AND GYNECOLOGY | Facility: CLINIC | Age: 54
End: 2024-05-16
Payer: COMMERCIAL

## 2024-05-16 VITALS — HEIGHT: 65 IN | BODY MASS INDEX: 19.83 KG/M2 | WEIGHT: 119.06 LBS

## 2024-05-16 DIAGNOSIS — N95.0 PMB (POSTMENOPAUSAL BLEEDING): Primary | ICD-10-CM

## 2024-05-16 DIAGNOSIS — N64.4 BREAST PAIN: ICD-10-CM

## 2024-05-16 DIAGNOSIS — L65.9 HAIR LOSS: ICD-10-CM

## 2024-05-16 PROCEDURE — 3008F BODY MASS INDEX DOCD: CPT | Mod: CPTII,S$GLB,, | Performed by: OBSTETRICS & GYNECOLOGY

## 2024-05-16 PROCEDURE — 1159F MED LIST DOCD IN RCRD: CPT | Mod: CPTII,S$GLB,, | Performed by: OBSTETRICS & GYNECOLOGY

## 2024-05-16 PROCEDURE — 99999 PR PBB SHADOW E&M-EST. PATIENT-LVL III: CPT | Mod: PBBFAC,,, | Performed by: OBSTETRICS & GYNECOLOGY

## 2024-05-16 PROCEDURE — 99213 OFFICE O/P EST LOW 20 MIN: CPT | Mod: S$GLB,,, | Performed by: OBSTETRICS & GYNECOLOGY

## 2024-05-16 RX ORDER — TRAMADOL HYDROCHLORIDE 50 MG/1
50 TABLET ORAL EVERY 4 HOURS PRN
COMMUNITY
Start: 2024-05-06

## 2024-05-16 NOTE — PROGRESS NOTES
"Subjective:       Patient ID: Raiza Cherry is a 54 y.o. female.    Chief Complaint:  HRT follow-up      History of Present Illness  55 y/o on Combipatch and having breast pain and bloating and still with occ spotting when she changes her patch. "its like I am having period symptoms with very tender breasts and swollen breasts and stomach and bloating and light bleeding " Started HRT for hair loss but doing well now on Minoxidil and would like to try stopping her HRT       GYN & OB History  Patient's last menstrual period was 04/06/2018.   Date of Last Pap: 11/9/2023 Pap normal and HPV neg     Path from D&C :12/27/23  4 mo ago    Final Pathologic Diagnosis 1. UTERINE SCRAPINGS:  - No endometrial tissue definitively identified.  - Benign endocervical tissue.  - No dysplasia or malignancy.    2. ENDOMETRIUM, POLYP, CURETTAGE:  - Benign endometrial polyp.  - Abundant benign smooth muscle, cannot exclude possible associated benign submucosal leiomyoma.  - No hyperplasia, atypia, or malignancy.   Comment: Interp By Reyna Henderson MD, Signed on 12/31/2023 at 15:49        Objective:     There were no vitals filed for this visit.    Physical Exam  deferred     Assessment/ Plan:          Raiza was seen today for hrt follow-up.    Diagnoses and all orders for this visit:    PMB (postmenopausal bleeding)    Hair loss    Breast pain    For now will try stopping Combipatch- if she has menopausal sx then we discussed using Estradiol patch and using Prometrium 200 nightly instead of Combipatch   For now pt will use 1/2 patch and wean over the next 2 weeks and then stop     Follow up in about 6 months (around 11/16/2024).      Health Maintenance         Date Due Completion Date    Colorectal Cancer Screening 02/16/2023 2/15/2023    COVID-19 Vaccine (4 - 2023-24 season) 09/01/2023 12/4/2021    Mammogram 08/04/2024 8/4/2023    Override on 2/3/2018: Done    Override on 2/2/2017: Done    Cervical Cancer Screening 11/02/2024 " 11/2/2023    Pneumococcal Vaccines (Age 0-64) (3 of 3 - PPSV23 or PCV20) 02/04/2027 2/4/2022    Lipid Panel 03/27/2029 3/27/2024    TETANUS VACCINE 09/23/2031 9/23/2021

## 2024-06-07 ENCOUNTER — PATIENT MESSAGE (OUTPATIENT)
Dept: GASTROENTEROLOGY | Facility: CLINIC | Age: 54
End: 2024-06-07
Payer: COMMERCIAL

## 2024-06-07 ENCOUNTER — LAB VISIT (OUTPATIENT)
Dept: LAB | Facility: HOSPITAL | Age: 54
End: 2024-06-07
Attending: INTERNAL MEDICINE
Payer: COMMERCIAL

## 2024-06-07 DIAGNOSIS — K51.311 ULCERATIVE RECTOSIGMOIDITIS WITH RECTAL BLEEDING: ICD-10-CM

## 2024-06-07 LAB
ALBUMIN SERPL BCP-MCNC: 4.1 G/DL (ref 3.5–5.2)
ALP SERPL-CCNC: 71 U/L (ref 55–135)
ALT SERPL W/O P-5'-P-CCNC: 15 U/L (ref 10–44)
ANION GAP SERPL CALC-SCNC: 6 MMOL/L (ref 8–16)
AST SERPL-CCNC: 17 U/L (ref 10–40)
BASOPHILS # BLD AUTO: 0.1 K/UL (ref 0–0.2)
BASOPHILS NFR BLD: 1.7 % (ref 0–1.9)
BILIRUB SERPL-MCNC: 0.5 MG/DL (ref 0.1–1)
BUN SERPL-MCNC: 15 MG/DL (ref 6–20)
CALCIUM SERPL-MCNC: 9.4 MG/DL (ref 8.7–10.5)
CHLORIDE SERPL-SCNC: 104 MMOL/L (ref 95–110)
CO2 SERPL-SCNC: 28 MMOL/L (ref 23–29)
CREAT SERPL-MCNC: 0.7 MG/DL (ref 0.5–1.4)
CRP SERPL-MCNC: 0.6 MG/L (ref 0–8.2)
DIFFERENTIAL METHOD BLD: ABNORMAL
EOSINOPHIL # BLD AUTO: 0.1 K/UL (ref 0–0.5)
EOSINOPHIL NFR BLD: 2.2 % (ref 0–8)
ERYTHROCYTE [DISTWIDTH] IN BLOOD BY AUTOMATED COUNT: 11.9 % (ref 11.5–14.5)
EST. GFR  (NO RACE VARIABLE): >60 ML/MIN/1.73 M^2
GLUCOSE SERPL-MCNC: 95 MG/DL (ref 70–110)
HBV CORE AB SERPL QL IA: NORMAL
HBV SURFACE AG SERPL QL IA: NORMAL
HCT VFR BLD AUTO: 42.6 % (ref 37–48.5)
HGB BLD-MCNC: 13.3 G/DL (ref 12–16)
IMM GRANULOCYTES # BLD AUTO: 0.01 K/UL (ref 0–0.04)
IMM GRANULOCYTES NFR BLD AUTO: 0.2 % (ref 0–0.5)
LYMPHOCYTES # BLD AUTO: 2.4 K/UL (ref 1–4.8)
LYMPHOCYTES NFR BLD: 40.2 % (ref 18–48)
MCH RBC QN AUTO: 31.4 PG (ref 27–31)
MCHC RBC AUTO-ENTMCNC: 31.2 G/DL (ref 32–36)
MCV RBC AUTO: 101 FL (ref 82–98)
MONOCYTES # BLD AUTO: 0.6 K/UL (ref 0.3–1)
MONOCYTES NFR BLD: 10.2 % (ref 4–15)
NEUTROPHILS # BLD AUTO: 2.7 K/UL (ref 1.8–7.7)
NEUTROPHILS NFR BLD: 45.5 % (ref 38–73)
NRBC BLD-RTO: 0 /100 WBC
PLATELET # BLD AUTO: 377 K/UL (ref 150–450)
PMV BLD AUTO: 10.5 FL (ref 9.2–12.9)
POTASSIUM SERPL-SCNC: 4 MMOL/L (ref 3.5–5.1)
PROT SERPL-MCNC: 7.4 G/DL (ref 6–8.4)
RBC # BLD AUTO: 4.23 M/UL (ref 4–5.4)
SODIUM SERPL-SCNC: 138 MMOL/L (ref 136–145)
WBC # BLD AUTO: 5.89 K/UL (ref 3.9–12.7)

## 2024-06-07 PROCEDURE — 83520 IMMUNOASSAY QUANT NOS NONAB: CPT | Performed by: INTERNAL MEDICINE

## 2024-06-07 PROCEDURE — 36415 COLL VENOUS BLD VENIPUNCTURE: CPT | Performed by: INTERNAL MEDICINE

## 2024-06-07 PROCEDURE — 86706 HEP B SURFACE ANTIBODY: CPT | Performed by: INTERNAL MEDICINE

## 2024-06-07 PROCEDURE — 86704 HEP B CORE ANTIBODY TOTAL: CPT | Performed by: INTERNAL MEDICINE

## 2024-06-07 PROCEDURE — 85025 COMPLETE CBC W/AUTO DIFF WBC: CPT | Performed by: INTERNAL MEDICINE

## 2024-06-07 PROCEDURE — 80053 COMPREHEN METABOLIC PANEL: CPT | Performed by: INTERNAL MEDICINE

## 2024-06-07 PROCEDURE — 86140 C-REACTIVE PROTEIN: CPT | Performed by: INTERNAL MEDICINE

## 2024-06-07 PROCEDURE — 87340 HEPATITIS B SURFACE AG IA: CPT | Performed by: INTERNAL MEDICINE

## 2024-06-10 ENCOUNTER — PATIENT MESSAGE (OUTPATIENT)
Dept: GASTROENTEROLOGY | Facility: CLINIC | Age: 54
End: 2024-06-10
Payer: COMMERCIAL

## 2024-06-10 DIAGNOSIS — K51.311 ULCERATIVE RECTOSIGMOIDITIS WITH RECTAL BLEEDING: Primary | ICD-10-CM

## 2024-06-10 LAB
FUAUA DOSE (IN MG): 90
FUAUA INTERVAL (IN WEEKS): 8
HBV SURFACE AB SER QL IA: NEGATIVE
HBV SURFACE AB SERPL IA-ACNC: <3 MIU/ML
USTEKINUMAB AB SERPL IA-ACNC: <10 AU/ML
USTEKINUMAB SERPL IA-MCNC: 3.9 MCG/ML

## 2024-06-12 ENCOUNTER — LAB VISIT (OUTPATIENT)
Dept: LAB | Facility: HOSPITAL | Age: 54
End: 2024-06-12
Attending: INTERNAL MEDICINE
Payer: COMMERCIAL

## 2024-06-12 ENCOUNTER — TELEPHONE (OUTPATIENT)
Dept: ENDOSCOPY | Facility: HOSPITAL | Age: 54
End: 2024-06-12
Payer: COMMERCIAL

## 2024-06-12 DIAGNOSIS — Z12.11 SCREENING FOR COLON CANCER: Primary | ICD-10-CM

## 2024-06-12 DIAGNOSIS — K51.311 ULCERATIVE RECTOSIGMOIDITIS WITH RECTAL BLEEDING: ICD-10-CM

## 2024-06-12 DIAGNOSIS — K51.311: ICD-10-CM

## 2024-06-12 PROCEDURE — 86480 TB TEST CELL IMMUN MEASURE: CPT | Performed by: INTERNAL MEDICINE

## 2024-06-12 RX ORDER — POLYETHYLENE GLYCOL 3350, SODIUM SULFATE, POTASSIUM CHLORIDE, MAGNESIUM SULFATE, AND SODIUM CHLORIDE FOR ORAL SOLUTION 178.7-7.3G
1 KIT ORAL DAILY
Qty: 2 EACH | Refills: 0 | Status: SHIPPED | OUTPATIENT
Start: 2024-06-12 | End: 2024-06-14

## 2024-06-12 NOTE — TELEPHONE ENCOUNTER
Spoke with Raiza for clarification:  IBD meds:  - Stelara q8w, LD: 6/9, ND: 8/4  - Lialda 4.8 G/d    In the last week:  - 3-4 BM/d, soft to loose, brown  - +blood occ, in stool and mostly on TP  - +mucous occ  - 2-3 false BR/d  - abd pain, diffuse cramping 6/10, relief with defecation, continuous dull ache no certain location  - denies noc BM  - Colonoscopy to be scheduled    Dr. Breaux will be updated.

## 2024-06-12 NOTE — TELEPHONE ENCOUNTER
Spoke to patient to schedule procedure(s) Colonoscopy       Physician to perform procedure(s) Dr. BENITO Breaux  Date of Procedure (s) 7/5/24  Arrival Time 7:15 AM  Time of Procedure(s) 8:15 AM   Location of Procedure(s) [place] 4th Floor  Type of Rx Prep sent to patient: Suflave  Instructions provided to patient via [facility]    Patient was informed on the following information and verbalized understanding. Screening questionnaire reviewed with patient and complete. If procedure requires anesthesia, a responsible adult needs to be present to accompany the patient home, patient cannot drive after receiving anesthesia. Appointment details are tentative, especially check-in time. Patient will receive a prep-op call 7 days prior to confirm check-in time for procedure. If applicable the patient should contact their pharmacy to verify Rx for procedure prep is ready for pick-up. Patient was advised to call the scheduling department at [phone] if pharmacy states no Rx is available. Patient was advised to call the endoscopy scheduling department if any questions or concerns arise.      [facility] Scheduling Department

## 2024-06-13 ENCOUNTER — LAB VISIT (OUTPATIENT)
Dept: LAB | Facility: HOSPITAL | Age: 54
End: 2024-06-13
Attending: INTERNAL MEDICINE
Payer: COMMERCIAL

## 2024-06-13 DIAGNOSIS — K51.311 ULCERATIVE RECTOSIGMOIDITIS WITH RECTAL BLEEDING: ICD-10-CM

## 2024-06-13 LAB
C DIFF GDH STL QL: NEGATIVE
C DIFF TOX A+B STL QL IA: NEGATIVE
GAMMA INTERFERON BACKGROUND BLD IA-ACNC: 0.08 IU/ML
M TB IFN-G CD4+ BCKGRND COR BLD-ACNC: 0.02 IU/ML
M TB IFN-G CD4+ BCKGRND COR BLD-ACNC: 0.11 IU/ML
MITOGEN IGNF BCKGRD COR BLD-ACNC: 9.92 IU/ML
TB GOLD PLUS: NEGATIVE

## 2024-06-13 PROCEDURE — 87045 FECES CULTURE AEROBIC BACT: CPT | Performed by: INTERNAL MEDICINE

## 2024-06-13 PROCEDURE — 83993 ASSAY FOR CALPROTECTIN FECAL: CPT | Performed by: INTERNAL MEDICINE

## 2024-06-13 PROCEDURE — 87324 CLOSTRIDIUM AG IA: CPT | Performed by: INTERNAL MEDICINE

## 2024-06-13 PROCEDURE — 87449 NOS EACH ORGANISM AG IA: CPT | Mod: 91 | Performed by: INTERNAL MEDICINE

## 2024-06-13 PROCEDURE — 87427 SHIGA-LIKE TOXIN AG IA: CPT | Mod: 59 | Performed by: INTERNAL MEDICINE

## 2024-06-13 PROCEDURE — 87046 STOOL CULTR AEROBIC BACT EA: CPT | Performed by: INTERNAL MEDICINE

## 2024-06-16 LAB — BACTERIA STL CULT: NORMAL

## 2024-06-17 LAB
CALPROTECTIN STL-MCNT: ABNORMAL MCG/G
E COLI SXT1 STL QL IA: NEGATIVE
E COLI SXT2 STL QL IA: NEGATIVE

## 2024-06-19 ENCOUNTER — TELEPHONE (OUTPATIENT)
Dept: GASTROENTEROLOGY | Facility: CLINIC | Age: 54
End: 2024-06-19
Payer: COMMERCIAL

## 2024-06-19 DIAGNOSIS — K51.311 ULCERATIVE RECTOSIGMOIDITIS WITH RECTAL BLEEDING: Primary | ICD-10-CM

## 2024-06-19 RX ORDER — MESALAMINE 1000 MG/1
1000 SUPPOSITORY RECTAL NIGHTLY
Qty: 30 SUPPOSITORY | Refills: 1 | Status: SHIPPED | OUTPATIENT
Start: 2024-06-19

## 2024-06-19 RX ORDER — USTEKINUMAB 90 MG/ML
90 INJECTION, SOLUTION SUBCUTANEOUS
Qty: 1 ML | Refills: 5 | Status: ACTIVE | OUTPATIENT
Start: 2024-06-19

## 2024-06-19 NOTE — TELEPHONE ENCOUNTER
- 54 YOF with left sided ulcerative colitis   - currently on stelara 90mg SC every 8 weeks (started 10/30/2023, LD 6/9) and lialda 4.8g/d (7/2020 - 3/2023, restarted last week 6/11/24)  - recent worsening of symptoms  - reports 8-10 soft BM a day with some urgency, no nocturnal BM; abdominal pain 6/10 improves after passing stool; pain improved after starting lialda   - was experiencing blood in the stool before, but has not noticed any in the last 2 days.   - discussed lab results with pt and plan of care for stelara dose optimization and topical treatment to help with symptoms in the meantime   - she has used canasa supp in the past and is concerned that she wont be able to hold in uceris foam BID so prefers supp instead  - stool cultures and c diff neg  - stool briseida significantly worsened   - will optimize stelara to 90mg SC Q4W (LD 6/9). New script sent to [facility]  - continue lialda 4.8 grams/d  - start canasa suppositories QHS  - labs up to date: CBC CMP 6/2024, repeat 12/2024; TB and hep B neg 6/2024, repeat 6/2025  - colonoscopy scheduled for 7/5  - pt verbalized understanding instructions   - case discussed with Dr. Gallagher

## 2024-06-20 ENCOUNTER — PATIENT MESSAGE (OUTPATIENT)
Dept: GASTROENTEROLOGY | Facility: CLINIC | Age: 54
End: 2024-06-20
Payer: COMMERCIAL

## 2024-06-21 NOTE — TELEPHONE ENCOUNTER
Called and spoke with pt  - reviewed plan of care from 6/19 regarding need for dose optimization due to elevated stool briseida (1150 on 6/13)  - assured pt that stelara approval can take time and she can start when comfortable  - currently on stelara 90mg SC every 8 weeks (started 10/30/2023, LD 6/9), lialda 4.8g/d (7/2020 - 3/2023, restarted last week 6/11/24), and canasa suppositories QHS (started 6/17)  - reports significant improvement in symptoms since starting canasa supp. QHS  - currently having 4-5 soft BM/day without blood; improved urgency and abdominal pain  - denies issues holding in the suppositories   - colonoscopy scheduled on 7/5  - will discuss plan and pt hesitancy with Dr. Breaux and call pt next week for an update.   - patient verbalized understating instructions

## 2024-06-23 ENCOUNTER — PATIENT MESSAGE (OUTPATIENT)
Dept: OBSTETRICS AND GYNECOLOGY | Facility: CLINIC | Age: 54
End: 2024-06-23
Payer: COMMERCIAL

## 2024-06-25 RX ORDER — PROGESTERONE 200 MG/1
200 CAPSULE ORAL NIGHTLY
Qty: 30 CAPSULE | Refills: 11 | Status: SHIPPED | OUTPATIENT
Start: 2024-06-25 | End: 2025-06-25

## 2024-06-25 RX ORDER — ESTRADIOL 0.03 MG/D
1 PATCH TRANSDERMAL
Qty: 4 PATCH | Refills: 11 | Status: SHIPPED | OUTPATIENT
Start: 2024-06-25 | End: 2025-06-25

## 2024-06-26 ENCOUNTER — PATIENT MESSAGE (OUTPATIENT)
Dept: ENDOSCOPY | Facility: HOSPITAL | Age: 54
End: 2024-06-26
Payer: COMMERCIAL

## 2024-06-26 ENCOUNTER — TELEPHONE (OUTPATIENT)
Dept: ENDOSCOPY | Facility: HOSPITAL | Age: 54
End: 2024-06-26
Payer: COMMERCIAL

## 2024-06-26 NOTE — TELEPHONE ENCOUNTER
Confirmed colonoscopy appt for 7/5/24 with pt. Reviewed instructions. Patient verbalized understanding. Confirmed ride home after procedure. Suflave prep instructions sent to portal.

## 2024-07-05 ENCOUNTER — HOSPITAL ENCOUNTER (OUTPATIENT)
Facility: HOSPITAL | Age: 54
Discharge: HOME | End: 2024-07-05
Attending: INTERNAL MEDICINE | Admitting: INTERNAL MEDICINE
Payer: COMMERCIAL

## 2024-07-05 ENCOUNTER — ANESTHESIA (OUTPATIENT)
Dept: ENDOSCOPY | Facility: HOSPITAL | Age: 54
End: 2024-07-05
Payer: COMMERCIAL

## 2024-07-05 ENCOUNTER — TELEPHONE (OUTPATIENT)
Dept: GASTROENTEROLOGY | Facility: CLINIC | Age: 54
End: 2024-07-05
Payer: COMMERCIAL

## 2024-07-05 ENCOUNTER — ANESTHESIA EVENT (OUTPATIENT)
Dept: ENDOSCOPY | Facility: HOSPITAL | Age: 54
End: 2024-07-05
Payer: COMMERCIAL

## 2024-07-05 VITALS
SYSTOLIC BLOOD PRESSURE: 119 MMHG | BODY MASS INDEX: 19.83 KG/M2 | OXYGEN SATURATION: 100 % | TEMPERATURE: 98 F | RESPIRATION RATE: 16 BRPM | DIASTOLIC BLOOD PRESSURE: 65 MMHG | WEIGHT: 119 LBS | HEIGHT: 65 IN | HEART RATE: 64 BPM

## 2024-07-05 DIAGNOSIS — K51.90 ULCERATIVE COLITIS: ICD-10-CM

## 2024-07-05 PROCEDURE — 37000008 HC ANESTHESIA 1ST 15 MINUTES: Performed by: INTERNAL MEDICINE

## 2024-07-05 PROCEDURE — 45380 COLONOSCOPY AND BIOPSY: CPT | Mod: ,,, | Performed by: INTERNAL MEDICINE

## 2024-07-05 PROCEDURE — 88305 TISSUE EXAM BY PATHOLOGIST: CPT | Performed by: PATHOLOGY

## 2024-07-05 PROCEDURE — 37000009 HC ANESTHESIA EA ADD 15 MINS: Performed by: INTERNAL MEDICINE

## 2024-07-05 PROCEDURE — 63600175 PHARM REV CODE 636 W HCPCS: Performed by: NURSE ANESTHETIST, CERTIFIED REGISTERED

## 2024-07-05 PROCEDURE — 25000003 PHARM REV CODE 250: Performed by: NURSE ANESTHETIST, CERTIFIED REGISTERED

## 2024-07-05 PROCEDURE — 27201012 HC FORCEPS, HOT/COLD, DISP: Performed by: INTERNAL MEDICINE

## 2024-07-05 PROCEDURE — 88342 IMHCHEM/IMCYTCHM 1ST ANTB: CPT | Performed by: PATHOLOGY

## 2024-07-05 PROCEDURE — 25000003 PHARM REV CODE 250: Performed by: INTERNAL MEDICINE

## 2024-07-05 PROCEDURE — 45380 COLONOSCOPY AND BIOPSY: CPT | Performed by: INTERNAL MEDICINE

## 2024-07-05 RX ORDER — PROPOFOL 10 MG/ML
VIAL (ML) INTRAVENOUS CONTINUOUS PRN
Status: DISCONTINUED | OUTPATIENT
Start: 2024-07-05 | End: 2024-07-05

## 2024-07-05 RX ORDER — LIDOCAINE HYDROCHLORIDE 20 MG/ML
INJECTION INTRAVENOUS
Status: DISCONTINUED | OUTPATIENT
Start: 2024-07-05 | End: 2024-07-05

## 2024-07-05 RX ORDER — PROPOFOL 10 MG/ML
VIAL (ML) INTRAVENOUS
Status: DISCONTINUED | OUTPATIENT
Start: 2024-07-05 | End: 2024-07-05

## 2024-07-05 RX ORDER — SODIUM CHLORIDE 9 MG/ML
INJECTION, SOLUTION INTRAVENOUS CONTINUOUS
Status: DISCONTINUED | OUTPATIENT
Start: 2024-07-05 | End: 2024-07-05 | Stop reason: HOSPADM

## 2024-07-05 RX ADMIN — PROPOFOL 70 MG: 10 INJECTION, EMULSION INTRAVENOUS at 08:07

## 2024-07-05 RX ADMIN — SODIUM CHLORIDE: 0.9 INJECTION, SOLUTION INTRAVENOUS at 07:07

## 2024-07-05 RX ADMIN — PROPOFOL 150 MCG/KG/MIN: 10 INJECTION, EMULSION INTRAVENOUS at 08:07

## 2024-07-05 RX ADMIN — LIDOCAINE HYDROCHLORIDE 40 MG: 20 INJECTION INTRAVENOUS at 08:07

## 2024-07-05 NOTE — ANESTHESIA POSTPROCEDURE EVALUATION
Anesthesia Post Evaluation    Patient: Raiza Cherry    Procedure(s) Performed: Procedure(s) (LRB):  COLONOSCOPY (N/A)    Final Anesthesia Type: general      Patient location during evaluation: GI PACU  Patient participation: Yes- Able to Participate  Level of consciousness: awake and alert and oriented  Post-procedure vital signs: reviewed and stable  Pain management: adequate  Airway patency: patent    PONV status at discharge: No PONV  Anesthetic complications: no      Cardiovascular status: hemodynamically stable  Respiratory status: unassisted, spontaneous ventilation and room air  Hydration status: euvolemic  Follow-up not needed.              Vitals Value Taken Time   /65 07/05/24 0924   Temp 36.6 °C (97.9 °F) 07/05/24 0854   Pulse 64 07/05/24 0924   Resp 16 07/05/24 0924   SpO2 100 % 07/05/24 0924         No case tracking events are documented in the log.      Pain/Kourtney Score: Kourtney Score: 10 (7/5/2024  9:10 AM)

## 2024-07-05 NOTE — PROVATION PATIENT INSTRUCTIONS
Discharge Summary/Instructions after an Endoscopic Procedure  Patient Name: Raiza Cherry  Patient MRN: [id number]  Patient YOB: 1970 Friday, July 5, 2024  Bran Breaux MD  Dear patient,  As a result of recent federal legislation (The Federal Cures Act), you may   receive lab or pathology results from your procedure in your [facility]   account before your physician is able to contact you. Your physician or   their representative will relay the results to you with their   recommendations at their soonest availability.  Thank you,  RESTRICTIONS:  During your procedure today, you received medications for sedation.  These   medications may affect your judgment, balance and coordination.  Therefore,   for 24 hours, you have the following restrictions:   - DO NOT drive a car, operate machinery, make legal/financial decisions,   sign important papers or drink alcohol.    ACTIVITY:  Today: no heavy lifting, straining or running due to procedural   sedation/anesthesia.  The following day: return to full activity including work.  DIET:  Eat and drink normally unless instructed otherwise.     TREATMENT FOR COMMON SIDE EFFECTS:  - Mild abdominal pain, nausea, belching, bloating or excessive gas:  rest,   eat lightly and use a heating pad.  - Sore Throat: treat with throat lozenges and/or gargle with warm salt   water.  - Because air was used during the procedure, expelling large amounts of air   from your rectum or belching is normal.  - If a bowel prep was taken, you may not have a bowel movement for 1-3 days.    This is normal.  SYMPTOMS TO WATCH FOR AND REPORT TO YOUR PHYSICIAN:  1. Abdominal pain or bloating, other than gas cramps.  2. Chest pain.  3. Back pain.  4. Signs of infection such as: chills or fever occurring within 24 hours   after the procedure.  5. Rectal bleeding, which would show as bright red, maroon, or black stools.   (A tablespoon of blood from the rectum is not serious, especially if    hemorrhoids are present.)  6. Vomiting.  7. Weakness or dizziness.  GO DIRECTLY TO THE NEAREST EMERGENCY ROOM IF YOU HAVE ANY OF THE FOLLOWING:      Difficulty breathing              Chills and/or fever over 101 F   Persistent vomiting and/or vomiting blood   Severe abdominal pain   Severe chest pain   Black, tarry stools   Bleeding- more than one tablespoon   Any other symptom or condition that you feel may need urgent attention  Your doctor recommends these additional instructions:  If any biopsies were taken, your doctors clinic will contact you in 1 to 2   weeks with any results.  - Discharge patient to home.   - Resume previous diet today.   - Continue present medications.   - Await pathology results.   - Repeat colonoscopy in 1 year to assess disease activity.   - Return to my office as previously scheduled.   - Recommend increasing Stelara to every 4 weeks given ongoing disease   activity. Can continue Canasa suppositories for now as well.  - Patient has a contact number available for emergencies.  The signs and   symptoms of potential delayed complications were discussed with the   patient.  Return to normal activities tomorrow.  Written discharge   instructions were provided to the patient.  For questions, problems or results please call your physician - Bran Breaux MD at Work:  [phone].  [facility], EMERGENCY ROOM PHONE NUMBER: [phone]  IF A COMPLICATION OR EMERGENCY SITUATION ARISES AND YOU ARE UNABLE TO REACH   YOUR PHYSICIAN - GO DIRECTLY TO THE EMERGENCY ROOM.  Bran Breaux MD  7/5/2024 8:52:41 AM  This report has been verified and signed electronically.  Dear patient,  As a result of recent federal legislation (The Federal Cures Act), you may   receive lab or pathology results from your procedure in your [facility]   account before your physician is able to contact you. Your physician or   their representative will relay the results to you with their    recommendations at their soonest availability.  Thank you,  PROVATION

## 2024-07-05 NOTE — TELEPHONE ENCOUNTER
- Received fax from [facility] to clarify Stelara dose  - Called [facility]- rep Boris- Confirmed to proceed with Stelara 90 mg SC every 4 week Rx given active disease and low drug level on standard dosing  - Rx set for delivery 7/9/24

## 2024-07-05 NOTE — TRANSFER OF CARE
"Anesthesia Transfer of Care Note    Patient: Raiza Cherry    Procedure(s) Performed: Procedure(s) (LRB):  COLONOSCOPY (N/A)    Patient location: GI    Anesthesia Type: general    Transport from OR: Transported from OR on room air with adequate spontaneous ventilation    Post pain: adequate analgesia    Post assessment: no apparent anesthetic complications and tolerated procedure well    Post vital signs: stable    Level of consciousness: awake, alert and oriented    Nausea/Vomiting: no nausea/vomiting    Complications: none    Transfer of care protocol was followed    Last vitals: Visit Vitals  BP (!) 101/58   Pulse 64   Temp 36.7 °C (98.1 °F) (Temporal)   Resp 14   Ht 5' 5" (1.651 m)   Wt 54 kg (119 lb)   LMP 04/06/2018   SpO2 96%   BMI 19.80 kg/m²     "

## 2024-07-05 NOTE — ANESTHESIA PREPROCEDURE EVALUATION
07/05/2024  Raiza Cherry is a 54 y.o., female.    Active Problem List with Overview Notes    Diagnosis Date Noted    Left bundle branch block 03/14/2023    Elective procedure for unacceptable cosmetic appearance 09/27/2022    Glaucoma 06/10/2013     Note: Unchanged      Ulcerative colitis 06/10/2013     Note: Unchanged - Proctitis       Past Surgical History:   Procedure Laterality Date    AUGMENTATION OF BREAST      implants were deflated by a doctor and will be removed 9/2022    BREAST SURGERY  2014, 2018    enlargement and lift of both breasts    BREAST SURGERY Bilateral 09/2022    implants removed    CATARACT EXTRACTION W/  INTRAOCULAR LENS IMPLANT Right 10/06/2021    DR.ELLEN BULLOCK    CATARACT EXTRACTION W/  INTRAOCULAR LENS IMPLANT Left 2013    DR.ELLEN BULLOCK    CERVICAL BIOPSY  W/ LOOP ELECTRODE EXCISION  02/2016    Moderate dysplasia completely excised margins clear and ECC negative    COLONOSCOPY N/A 01/19/2022    Procedure: COLONOSCOPY;  Surgeon: Bran Breaux MD;  Location: [facility] ([address] [place] [address]);  Service: Endoscopy;  Laterality: N/A;  fully vaccinated 3/27/21, instructions sent to [email or web address]      COVID test at [place] on 1/16-GT    FLEXIBLE SIGMOIDOSCOPY N/A 02/15/2023    Procedure: SIGMOIDOSCOPY-FLEXIBLE;  Surgeon: Bran Breaux MD;  Location: [facility] ([address] [place] [address]);  Service: Endoscopy;  Laterality: N/A;  instr emailed -ml  Pre call done did not answer 2/9/23-DB    HYSTEROSCOPIC POLYPECTOMY OF UTERUS N/A 12/27/2023    Procedure: POLYPECTOMY, UTERUS, HYSTEROSCOPIC;  Surgeon: Estuardo Robles MD;  [facility];  Service: OB/GYN;  Laterality: N/A;  Deficit-75  Total- 150    MASTOPEXY Bilateral 09/27/2022    Procedure: MASTOPEXY FULL WITH IMPLANT BAG REMOVAL;  Surgeon: Armen Reynolds Jr., MD;  Location: [facility] OR;  Service: Plastics;  Laterality:  "Bilateral;  2HRS    TOTAL REDUCTION MAMMOPLASTY  09/2022    lift procedure and implants removed     Results for orders placed or performed during the hospital encounter of 03/02/23   EKG 12-lead    Collection Time: 03/02/23  1:05 PM    Narrative    Test Reason : K51.311,    Vent. Rate : 059 BPM     Atrial Rate : 059 BPM     P-R Int : 144 ms          QRS Dur : 136 ms      QT Int : 426 ms       P-R-T Axes : 072 003 080 degrees     QTc Int : 421 ms    Sinus bradycardia  Left bundle branch block  Abnormal ECG  No previous ECGs available  Confirmed by Saul Decker MD ([id number]) on 3/3/2023 11:02:17 AM    Referred By: JELENA GREGG           Confirmed By:Saul Decker MD     Transthoracic echo (TTE) complete    Height: 5' 5" (1.651 m)   Weight: 54.4 kg (120 lb)   Blood Pressure: 149/89    Date of Study: 4/14/23   Ordering Provider: Aicha Nguyen MD   Clinical Indications: Left bundle branch block [I44.7 (ICD-10-CM)]       [facility]  Performing Staff   Cardiology: Petar Perez Jr., MD    Tech: Lori Gambino        Reason for Exam  Priority: Routine  Dx: Left bundle branch block [I44.7 (ICD-10-CM)]     Summary    The left ventricle is mildly enlarged with low normal systolic function.  Normal left ventricular diastolic function.  The quantitatively derived ejection fraction is 53%.  There is abnormal septal wall motion consistent with left bundle branch block.  The estimated PA systolic pressure is 22 mmHg.  Normal right ventricular size with normal right ventricular systolic function.  Normal central venous pressure (3 mmHg).  There is no pulmonary hypertension.    Social History     Socioeconomic History    Marital status: Single   Tobacco Use    Smoking status: Never    Smokeless tobacco: Never   Substance and Sexual Activity    Alcohol use: Yes     Comment: Rarely    Drug use: No    Sexual activity: Yes     Partners: Male     Birth control/protection: Post-menopausal     Comment: "      Social Determinants of Health     Financial Resource Strain: Low Risk  (10/4/2023)    Received from [facility]    Overall Financial Resource Strain (CARDIA)     Difficulty of Paying Living Expenses: Not very hard   Food Insecurity: No Food Insecurity (10/4/2023)    Received from [facility]    Hunger Vital Sign     Worried About Running Out of Food in the Last Year: Never true     Ran Out of Food in the Last Year: Never true   Transportation Needs: No Transportation Needs (10/4/2023)    Received from [facility]    PRAPARE - Transportation     Lack of Transportation (Medical): No     Lack of Transportation (Non-Medical): No   Physical Activity: Insufficiently Active (10/4/2023)    Received from [facility]    Exercise Vital Sign     Days of Exercise per Week: 4 days     Minutes of Exercise per Session: 30 min   Stress: No Stress Concern Present (10/4/2023)    Received from [facility]    [employer] - Occupational Stress Questionnaire     Feeling of Stress : Only a little   Housing Stability: Low Risk  (10/4/2023)    Received from [facility]    Housing Stability Vital Sign     Unable to Pay for Housing in the Last Year: No     Number of Places Lived in the Last Year: 1     In the last 12 months, was there a time when you did not have a steady place to sleep or slept in a shelter (including now)?: No       Pre-op Assessment    I have reviewed the Patient Summary Reports.    I have reviewed the NPO Status.   I have reviewed the Medications.     Review of Systems  Anesthesia Hx:  No problems with previous Anesthesia                Social:  Non-Smoker       Hematology/Oncology:  Hematology Normal   Oncology Normal                                   EENT/Dental:  EENT/Dental Normal           Cardiovascular:         Dysrhythmias          ECG has been reviewed. L BBB                          Pulmonary:  Pulmonary Normal                       Renal/:  Renal/ Normal                 Hepatic/GI:   PUD,     Ulcerative colitis          Musculoskeletal:  Musculoskeletal Normal                Neurological:  Neurology Normal                                      Endocrine:  Endocrine Normal            Dermatological:  Skin Normal    Psych:  Psychiatric Normal                  Physical Exam  General: Well nourished, Cooperative, Alert and Oriented    Airway:  Mallampati: II   Mouth Opening: Normal  TM Distance: Normal  Tongue: Normal  Neck ROM: Normal ROM    Dental:  Intact    Chest/Lungs:  Normal Respiratory Rate    Anesthesia Plan  Type of Anesthesia, risks & benefits discussed:    Anesthesia Type: Gen Natural Airway  Intra-op Monitoring Plan: Standard ASA Monitors  Post Op Pain Control Plan: multimodal analgesia  Induction:  IV  Informed Consent: Informed consent signed with the Patient and all parties understand the risks and agree with anesthesia plan.  All questions answered.   ASA Score: 2  Day of Surgery Review of History & Physical: H&P Update referred to the surgeon/provider.    Ready For Surgery From Anesthesia Perspective.   .

## 2024-07-05 NOTE — H&P
Short Stay Endoscopy History and Physical    PCP - Estuardo Robles MD    Procedure - Colonoscopy  ASA - per anesthesia  Mallampati - per anesthesia  Plan of anesthesia - MAC    HPI:  This is a 54 y.o. female here for evaluation of : rectal bleeding and ulcerative colitis follow up    ROS:  Constitutional: No fevers, chills  CV: No chest pain  Pulm: No cough  Ophtho: No vision changes  GI: see HPI  Derm: No rash    Medical History:  has a past medical history of Abnormal Pap smear of cervix, Cataract, Chronic diarrhea, Glaucoma, and Ulcerative colitis.    Surgical History:  has a past surgical history that includes Breast surgery (2014, 2018); Cervical biopsy w/ loop electrode excision (02/2016); Augmentation of breast; Cataract extraction w/  intraocular lens implant (Right, 10/06/2021); Cataract extraction w/  intraocular lens implant (Left, 2013); Colonoscopy (N/A, 01/19/2022); Mastopexy (Bilateral, 09/27/2022); Breast surgery (Bilateral, 09/2022); Flexible sigmoidoscopy (N/A, 02/15/2023); Total Reduction Mammoplasty (09/2022); and Hysteroscopic polypectomy of uterus (N/A, 12/27/2023).    Family History: family history includes Asthma in her sister; Diabetes in her mother; Glaucoma in her father; Heart disease in her mother; Lung disease in her father; Lupus in her sister; No Known Problems in her daughter, daughter, sister, sister, and son.. Otherwise no colon cancer, inflammatory bowel disease, or GI malignancies.    Social History:  reports that she has never smoked. She has never used smokeless tobacco. She reports that she does not currently use alcohol. She reports that she does not use drugs.    Review of patient's allergies indicates:   Allergen Reactions    Penicillin Rash       Medications:   Medications Prior to Admission   Medication Sig Dispense Refill Last Dose    latanoprost 0.005 % ophthalmic solution Place 1 drop into both eyes nightly.   7/4/2024    mesalamine (CANASA) 1000 MG Supp Place 1  suppository (1,000 mg total) rectally nightly. 30 suppository 1 7/4/2024    mesalamine (LIALDA) 1.2 gram TbEC TAKE 4 TABLETS BY MOUTH DAILY WITH BREAKFAST. 120 tablet 3 7/4/2024    minoxidiL (LONITEN) 2.5 MG tablet Take by mouth.   7/4/2024    progesterone (PROMETRIUM) 200 MG capsule Take 1 capsule (200 mg total) by mouth nightly. 30 capsule 11     timolol maleate 0.5% (TIMOPTIC) 0.5 % Drop Place 1 drop into both eyes 2 (two) times daily.   7/5/2024    ascorbic acid, vitamin C, (VITAMIN C) 100 MG tablet Take 100 mg by mouth once daily.   7/3/2024    cholecalciferol, vitamin D3, (VITAMIN D3 ORAL) Take by mouth once daily. Not sure   7/3/2024    clobetasoL (CLOBEX) 0.05 % shampoo SMARTSIG:Topical 2-3 Times Weekly       estradiol 0.025 mg/24 hr 0.025 mg/24 hr Place 1 patch onto the skin every 7 days. 4 patch 11     estradiol-norethindrone (COMBIPATCH) 0.05-0.14 mg/24 hr Apply patch to clean skin and change twice weekly 8 patch 11 7/3/2024    fluocinolone and shower cap 0.01 % Oil Apply topically every evening.       hydrOXYchloroQUINE (PLAQUENIL) 200 mg tablet Take 200 mg by mouth 2 (two) times daily.   7/3/2024    traMADoL (ULTRAM) 50 mg tablet Take 50 mg by mouth every 4 (four) hours as needed.   More than a month    tretinoin (RETIN-A) 0.05 % cream Apply topically nightly.   7/3/2024    UNABLE TO FIND Take by mouth once daily. Nutrafol       ustekinumab (STELARA) 90 mg/mL Syrg syringe Inject 1 mL (90 mg total) into the skin every 28 days. 1 mL 5 6/7/2024         Vital Signs:   Vitals:    07/05/24 0729   BP: (!) 143/70   Pulse: 62   Resp: 18   Temp: 98.1 °F (36.7 °C)     Labs:  Lab Results   Component Value Date    WBC 5.89 06/07/2024    HGB 13.3 06/07/2024    HCT 42.6 06/07/2024     06/07/2024    ALT 15 06/07/2024    AST 17 06/07/2024     06/07/2024    K 4.0 06/07/2024     06/07/2024    CREATININE 0.7 06/07/2024    BUN 15 06/07/2024    CO2 28 06/07/2024    TSH 1.827 11/02/2023       I have  explained the risks and benefits of endoscopy procedures to the patient/their POA including but not limited to bleeding, perforation, infection, and death.  The patient/their POA was asked if they understand and allowed to ask any further questions to their satisfaction.    Farhad Lockhart MD

## 2024-07-11 DIAGNOSIS — K51.311 ULCERATIVE RECTOSIGMOIDITIS WITH RECTAL BLEEDING: ICD-10-CM

## 2024-07-11 RX ORDER — MESALAMINE 1000 MG/1
SUPPOSITORY RECTAL
Qty: 90 SUPPOSITORY | Refills: 0 | Status: SHIPPED | OUTPATIENT
Start: 2024-07-11 | End: 2024-10-09

## 2024-07-12 LAB
FINAL PATHOLOGIC DIAGNOSIS: NORMAL
GROSS: NORMAL
Lab: NORMAL
SUPPLEMENTAL DIAGNOSIS: NORMAL

## 2024-07-26 NOTE — TELEPHONE ENCOUNTER
Noted to have some increasing symptoms over the last week.  We will check stool studies to rule out infection and repeat calprotectin level.  If the calprotectin level is elevated but infectious studies are negative would recommend increasing Stelara dose to every 4 week injections.  May also need a short course of steroids to try to get things back under control.   Sprain and strain of wrist or hand

## 2024-08-05 ENCOUNTER — HOSPITAL ENCOUNTER (OUTPATIENT)
Dept: RADIOLOGY | Facility: HOSPITAL | Age: 54
Discharge: HOME | End: 2024-08-05
Attending: OBSTETRICS & GYNECOLOGY
Payer: COMMERCIAL

## 2024-08-05 VITALS — WEIGHT: 119 LBS | BODY MASS INDEX: 19.83 KG/M2 | HEIGHT: 65 IN

## 2024-08-05 DIAGNOSIS — Z12.31 ENCOUNTER FOR SCREENING MAMMOGRAM FOR BREAST CANCER: ICD-10-CM

## 2024-08-05 PROCEDURE — 77067 SCR MAMMO BI INCL CAD: CPT | Mod: TC

## 2024-09-24 ENCOUNTER — PATIENT MESSAGE (OUTPATIENT)
Dept: GASTROENTEROLOGY | Facility: CLINIC | Age: 54
End: 2024-09-24
Payer: COMMERCIAL

## 2024-09-25 ENCOUNTER — TELEPHONE (OUTPATIENT)
Dept: GASTROENTEROLOGY | Facility: CLINIC | Age: 54
End: 2024-09-25
Payer: COMMERCIAL

## 2024-09-25 ENCOUNTER — OFFICE VISIT (OUTPATIENT)
Dept: GASTROENTEROLOGY | Facility: CLINIC | Age: 54
End: 2024-09-25
Payer: COMMERCIAL

## 2024-09-25 VITALS — HEIGHT: 65 IN | BODY MASS INDEX: 19.99 KG/M2 | WEIGHT: 120 LBS

## 2024-09-25 DIAGNOSIS — K51.311 ULCERATIVE RECTOSIGMOIDITIS WITH RECTAL BLEEDING: Primary | ICD-10-CM

## 2024-09-25 DIAGNOSIS — Z79.899 IMMUNOSUPPRESSION DUE TO DRUG THERAPY: ICD-10-CM

## 2024-09-25 DIAGNOSIS — K59.09 CONSTIPATION, CHRONIC: ICD-10-CM

## 2024-09-25 DIAGNOSIS — D84.821 IMMUNOSUPPRESSION DUE TO DRUG THERAPY: ICD-10-CM

## 2024-09-25 DIAGNOSIS — R63.5 WEIGHT GAIN: ICD-10-CM

## 2024-09-25 PROCEDURE — 1160F RVW MEDS BY RX/DR IN RCRD: CPT | Mod: CPTII,95,, | Performed by: INTERNAL MEDICINE

## 2024-09-25 PROCEDURE — 99214 OFFICE O/P EST MOD 30 MIN: CPT | Mod: 95,,, | Performed by: INTERNAL MEDICINE

## 2024-09-25 PROCEDURE — 1159F MED LIST DOCD IN RCRD: CPT | Mod: CPTII,95,, | Performed by: INTERNAL MEDICINE

## 2024-09-25 PROCEDURE — 3008F BODY MASS INDEX DOCD: CPT | Mod: CPTII,95,, | Performed by: INTERNAL MEDICINE

## 2024-09-25 PROCEDURE — G2211 COMPLEX E/M VISIT ADD ON: HCPCS | Mod: 95,,, | Performed by: INTERNAL MEDICINE

## 2024-09-25 NOTE — PROGRESS NOTES
[facility]          Inflammatory Bowel Disease Follow Up Consultation Note         TODAY'S VISIT DATE:  9/25/2024    Reason for Consult:    Chief Complaint   Patient presents with    Ulcerative rectosigmoiditis       PCP: Estuardo Robles      Referring MD:   No ref. provider found    History of Present Illness:  Raiza Cherry who is a 54 y.o. female is being seen today at the [facility] on 09/25/2024 for inflammatory bowel disease- ulcerative colitis.  After our appointment in March of 2024 she was having some increasing symptoms so a repeat stool calprotectin level was checked in June of 2024 and was still markedly elevated over a 1000.  Her Stelara level was slightly low at 3.9.  She underwent a colonoscopy in July of 2024 that showed ongoing disease activity in the left colon although this did appear improved over her prior scope.  Based on this we increased her dose of Stelara to every 4 weeks.  Today she reports that she has received 2 doses of the 4 week injections and has noticed a significant improvement in her colitis symptoms and if anything she is currently having some constipation problems.  She has also gained about 5 lb since she has been feeling like her colitis is under better control.  She questioned whether we might be able to consider every 6 week dosing rather than every 4 week dosing.  She denies any side effects directly associated with the Stelara injections.  She does not have any new complaints.    IBD History:  She has a history of left-sided ulcerative colitis.  This was diagnosed around 1990.  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  She has been managed with multiple 5 ASA products (both oral and rectal) as well as prednisone (tolerated very poorly) and Entocort. Her  last colonoscopy was in October 2018 at which time there was some mild proctitis and some atrophy in the left colon.  Earlier this year she saw Dr. Gil because of active symptoms.  She started her on Lialda 4.8 g daily and Canasa suppositories twice daily initially.  After she was doing better these were reduced down to once daily.  Eventually she stop the Canasa suppositories and was only taking Lialda 4.8 g daily.  She noted a significant increase in her symptoms after being off of the suppositories.  In January 2022 she underwent colonoscopy that showed active inflammation of the left colon.  At that time she reported that she was not taking Lialda for the last few months and was only taking Canasa suppositories.  She started taking Lialda 4.8 g daily and Canasa suppositories daily on a consistent basis in early 2022. A stool calprotectin level in early May 2022 was markedly elevated.  She did not submit a sample prior to starting the Lialda so I do not have a baseline to compare to.  A repeat sigmoidoscopy was done in early 2023 that showed ongoing disease activity.  We discussed possibly starting Zeposia but due to a left bundle branch block that was found on her EKG we decided against this.  We discussed starting Stelara but she decided to focus on treating her hair loss issues.  She finally started Stelara October 30, 2023.  A follow-up stool calprotectin after she had been on therapy for almost 4 months showed a major decline to 110. After our appointment in March of 2024 she was having some increasing symptoms so a repeat stool calprotectin level was checked in June of 2024 and was still markedly elevated over a 1000.  Her Stelara level was slightly low at 3.9.  She underwent a colonoscopy in July of 2024 that showed ongoing disease activity in the left colon although this did appear improved over her prior scope.  Based on this we increased her dose of Stelara to every 4 weeks.    IBD Details:  Dx Date:   1990  Disease type/distribution:  Ulcerative colitis/left colon disease extending to the descending colon  Current Treatment:  Stelara 90 mg every 4 weeks/ Lialda 4.8 g daily  Start Date:  October 30, 2023/July 2020  Response:  Secondary loss of response  Optimized:  No  Adverse reactions:  None  Prior surgeries:  None  CRP Elevation:  No  calprotectin: Elevated with active disease  Disease Complications:  None  Extraintestinal manifestations:  Hair loss  Prior treatments:   Steroids:  Good response  5ASA:  Incomplete response  IMM:  None  TNF Inh:  None   Anti-Integrin:  None   IL 12/23:  Stelara-current medication-initial significant improvement in biochemical markers but subsequent loss of response  ELIZABETH Inh:  None    Previous Clinical Trials:  None    Last Colonoscopy:   February 2023-flex sig-active left-sided colitis  January 2022-inflammation in the rectum, sigmoid, descending colon, otherwise normal     October 2018-rectal inflammation, atrophy of the left colon, otherwise normal    Other Endoscopies:  None    Imaging:   MRE:  None   CT:  None   Other:  None    Pertinent Labs:  Lab Results   Component Value Date    SEDRATE 6 06/12/2019    CRP 0.6 06/07/2024     Lab Results   Component Value Date    TTGIGA 5 07/29/2022     07/29/2022     Lab Results   Component Value Date    TSH 1.827 11/02/2023    FREET4 1.03 11/02/2023     Lab Results   Component Value Date    GCDVRNCL56SX 62 03/02/2023    ZGUSULUP86 >2000 (H) 07/29/2022     Lab Results   Component Value Date    HEPBSAG Non-reactive 06/07/2024    HEPBCAB Non-reactive 06/07/2024    HEPCAB Negative 10/13/2021     Lab Results   Component Value Date    NYM11DTPU Negative 10/13/2021     Lab Results   Component Value Date    NIL 0.61483 06/12/2024    MITOGENNIL 9.919 06/12/2024     Lab Results   Component Value Date    TPTMINTERP SEE BELOW 10/13/2021     Lab Results   Component Value Date    STOOLCULTURE  06/13/2024     No  "Salmonella,Shigella,Vibrio,Campylobacter,Yersinia isolated.    QAJFOCLSJR7B Negative 06/13/2024    NOYXCSSJZT7C Negative 06/13/2024    CDIFFICILEAN Negative 06/13/2024    CDIFFTOX Negative 06/13/2024     Lab Results   Component Value Date    CALPROTECTIN 1,150.0 (H) 06/13/2024       Therapeutic Drug Monitoring Labs:  No results found for: "PROMETH"  No results found for: "ANSADAINIT", "INFLIXIMAB", "INFLIXINTERP"    Vaccinations:  Lab Results   Component Value Date    HEPBSAB Negative 10/13/2021     Lab Results   Component Value Date    HEPAIGG Negative 10/13/2021     Lab Results   Component Value Date    VARICELLAZOS 4.03 (H) 10/13/2021    VARICELLAINT Positive (A) 10/13/2021     Immunization History   Administered Date(s) Administered    COVID-19, MRNA, LN-S, PF (Pfizer) (Purple Cap) 03/06/2021, 03/27/2021, 12/04/2021    Influenza 09/30/2019, 10/01/2020, 11/01/2021, 09/07/2023    Pneumococcal Conjugate - 13 Valent 10/15/2020    Pneumococcal Polysaccharide - 23 Valent 02/04/2022    Tdap 09/23/2021    Zoster Recombinant 02/04/2022, 04/11/2022         Review of Systems  Review of Systems   Constitutional:  Negative for chills, fever and weight loss.   HENT:  Negative for sore throat.    Eyes:  Negative for pain, discharge and redness.   Respiratory:  Negative for cough, shortness of breath and wheezing.    Cardiovascular:  Negative for chest pain, orthopnea and leg swelling.   Gastrointestinal:  Negative for abdominal pain, blood in stool, constipation, diarrhea, heartburn, melena, nausea and vomiting.   Genitourinary:  Negative for dysuria, frequency and urgency.   Musculoskeletal:  Negative for back pain, joint pain and myalgias.   Skin:  Negative for itching and rash.        Hair loss   Neurological:  Negative for focal weakness and seizures.   Endo/Heme/Allergies:  Does not bruise/bleed easily.   Psychiatric/Behavioral:  Negative for depression. The patient is not nervous/anxious.             Medical History: "   Past Medical History:   Diagnosis Date    Abnormal Pap smear of cervix     LEEP 2016, 9-2017 LGSIL, colpo  mildly abnl cells,     Cataract     Chronic diarrhea     Glaucoma     Ulcerative colitis     proctitis       Surgical History:  Past Surgical History:   Procedure Laterality Date    AUGMENTATION OF BREAST      implants were deflated by a doctor and will be removed 9/2022    BREAST SURGERY  2014, 2018    enlargement and lift of both breasts    BREAST SURGERY Bilateral 09/2022    implants removed    CATARACT EXTRACTION W/  INTRAOCULAR LENS IMPLANT Right 10/06/2021    DR.ELLEN BULLOCK    CATARACT EXTRACTION W/  INTRAOCULAR LENS IMPLANT Left 2013    DR.ELLEN BULLOCK    CERVICAL BIOPSY  W/ LOOP ELECTRODE EXCISION  02/2016    Moderate dysplasia completely excised margins clear and ECC negative    COLONOSCOPY N/A 01/19/2022    Procedure: COLONOSCOPY;  Surgeon: Bran Breaux MD;  [facility]  Service: Endoscopy;  Laterality: N/A;  fully vaccinated 3/27/21, instructions sent to [email or web address]      COVID test at [place] on 1/16-GT    COLONOSCOPY N/A 7/5/2024    Procedure: COLONOSCOPY;  Surgeon: Bran Breaux MD;  Location: [facility] (4TH FLR);  Service: Endoscopy;  Laterality: N/A;  ref by kunal Breaux pt [place], [facility]  6/26 pre call complete-RB    FLEXIBLE SIGMOIDOSCOPY N/A 02/15/2023    Procedure: SIGMOIDOSCOPY-FLEXIBLE;  Surgeon: Bran Breaux MD;  Location: [facility] (4TH FLR);  Service: Endoscopy;  Laterality: N/A;  instr emailed -ml  Pre call done did not answer 2/9/23-DB    HYSTEROSCOPIC POLYPECTOMY OF UTERUS N/A 12/27/2023    Procedure: POLYPECTOMY, UTERUS, HYSTEROSCOPIC;  Surgeon: Estuardo Robles MD;  Location: [facility] OR;  Service: OB/GYN;  Laterality: N/A;  Deficit-75  Total- 150    MASTOPEXY Bilateral 09/27/2022    Procedure: MASTOPEXY FULL WITH IMPLANT BAG REMOVAL;  Surgeon: Armen Reynolds Jr., MD;  Location: [facility] OR;  Service: Plastics;   Laterality: Bilateral;  2HRS    TOTAL REDUCTION MAMMOPLASTY  09/2022    lift procedure and implants removed       Family History:   Family History   Problem Relation Name Age of Onset    Lung disease Father      Glaucoma Father      Diabetes Mother      Heart disease Mother      Lupus Sister      Asthma Sister      No Known Problems Sister      No Known Problems Sister      No Known Problems Son      No Known Problems Daughter      No Known Problems Daughter      Breast cancer Neg Hx      Colon cancer Neg Hx      Ovarian cancer Neg Hx      Cancer Neg Hx      Amblyopia Neg Hx      Blindness Neg Hx      Macular degeneration Neg Hx      Retinal detachment Neg Hx      Strabismus Neg Hx         Social History:   Social History     Tobacco Use    Smoking status: Never    Smokeless tobacco: Never   Substance Use Topics    Alcohol use: Not Currently    Drug use: No       Allergies: Reviewed    Home Medications:   Medication List with Changes/Refills   Current Medications    ASCORBIC ACID, VITAMIN C, (VITAMIN C) 100 MG TABLET    Take 100 mg by mouth once daily.    CHOLECALCIFEROL, VITAMIN D3, (VITAMIN D3 ORAL)    Take by mouth once daily. Not sure    CLOBETASOL (CLOBEX) 0.05 % SHAMPOO    SMARTSIG:Topical 2-3 Times Weekly    ESTRADIOL 0.025 MG/24 HR 0.025 MG/24 HR    Place 1 patch onto the skin every 7 days.    ESTRADIOL-NORETHINDRONE (COMBIPATCH) 0.05-0.14 MG/24 HR    Apply patch to clean skin and change twice weekly    FLUOCINOLONE AND SHOWER CAP 0.01 % OIL    Apply topically every evening.    HYDROXYCHLOROQUINE (PLAQUENIL) 200 MG TABLET    Take 200 mg by mouth 2 (two) times daily.    LATANOPROST 0.005 % OPHTHALMIC SOLUTION    Place 1 drop into both eyes nightly.    MESALAMINE (CANASA) 1000 MG SUPP    UNWRAP AND INSERT 1 SUPPOSITORY (1,000 MG TOTAL) RECTALLY NIGHTLY.    MESALAMINE (LIALDA) 1.2 GRAM TBEC    TAKE 4 TABLETS BY MOUTH DAILY WITH BREAKFAST.    MINOXIDIL (LONITEN) 2.5 MG TABLET    Take by mouth.    PROGESTERONE  "(PROMETRIUM) 200 MG CAPSULE    Take 1 capsule (200 mg total) by mouth nightly.    TIMOLOL MALEATE 0.5% (TIMOPTIC) 0.5 % DROP    Place 1 drop into both eyes 2 (two) times daily.    TRAMADOL (ULTRAM) 50 MG TABLET    Take 50 mg by mouth every 4 (four) hours as needed.    TRETINOIN (RETIN-A) 0.05 % CREAM    Apply topically nightly.    UNABLE TO FIND    Take by mouth once daily. Nutrafol    USTEKINUMAB (STELARA) 90 MG/ML SYRG SYRINGE    Inject 1 mL (90 mg total) into the skin every 28 days.       Physical Exam:  Vital Signs:  Ht 5' 5" (1.651 m)   Wt 54.4 kg (120 lb)   LMP 04/06/2018   BMI 19.97 kg/m²   Body mass index is 19.97 kg/m².    Physical Exam  Constitutional:       General: She is not in acute distress.     Appearance: Normal appearance. She is not ill-appearing.   Neurological:      General: No focal deficit present.      Mental Status: She is alert and oriented to person, place, and time.   Psychiatric:         Mood and Affect: Mood normal.         Behavior: Behavior normal.         Thought Content: Thought content normal.         Judgment: Judgment normal.         Labs: reviewed and pertinent noted above    Assessment/Plan:  Raiza Cherry is a 54 y.o. female with left-sided ulcerative colitis. The following issues were addresssed:    1. Ulcerative rectosigmoiditis with rectal bleeding    2. Immunosuppression due to drug therapy    3. Constipation, chronic    4. Weight gain      1.  Ulcerative colitis:  Currently it sounds like she is doing pretty well.  She definitely has noticed a decreased in stool frequency and improvement in consistency since increasing the dose of Stelara.  We discussed today that the issues with weight gain and constipation or not a direct side effect of Stelara but rather a consequence of having her colitis under better control.  I encouraged her to increase her water intake and consider a high-fiber diet and potentially use MiraLax for the constipation if necessary.  She had " asked about possibly decreasing from every 4 weeks to every 6 week injections.  I think that is not unreasonable given the fact that her drug level was pretty close to the therapeutic level prior to her colonoscopy.  She will changed every 6 week injections to see if this is able to maintain her symptom control.  We will plan to update labs in December and check a follow-up stool calprotectin level around early March.  We will follow up in 6 months and see how she is doing at that point.  If she has any worsening of symptoms she will notify me and we will plan to go back to every 4 week injections.    2. Constipation: Increase water and fiber intake.  Consider MiraLax if needed.    3. Immunosuppression:  Received flu shot last week.      4. Weight gain: Arrange for an appointment with dietitian.        # IBD specific health maintenance:  Colon cancer surveillance:  Up-to-date    Annual:  - Eye exam:  Not applicable  - Skin exam (if on IMM/TNF):  Up-to-date  - reminded pt to use sunblock/hats/sunprotective clothing  - PAP (if immunosuppressed):  June 2021    DEXA:  Not applicable    Vitamin D:  Normal    Vaccines:    Influenza:  Up-to-date   Pneumovax:     PCV13:  Received    PSV23:  Up-to-date   HAV:  Discuss in the future   HBV:  Discuss in the   Tdap:  Needs to be updated   MMR:  Immune   VZV:  Immune   HZV:  Up-to-date   HPV:  Not applicable   Meningococcus:  Not applicable   COVID:  Completed series and booster      Follow up: Follow up in about 6 months (around 3/25/2025).    Visit today is associated with current or anticipated ongoing medical care related to this patient's single serious condition/complex condition (ulcerative colitis).      Thank you again for sending Raiza Cherry to see Dr. Shakir Breaux today at the [facility]. Please don't hesitate to contact Dr. Breaux if there are any questions regarding this evaluation, or if you have any other patients with  inflammatory bowel disease for whom you would like a consultation. You can reach Dr. Breaux at [phone] or by email at [email or web address]    Bran Breaux MD  Department of Gastroenterology  Inflammatory Bowel Disease      The patient location is:  [place]  The chief complaint leading to consultation is:  Ulcerative colitis    Visit type: audiovisual    Face to Face time with patient:  15  Twenty-five minutes of total time spent on the encounter, which includes face to face time and non-face to face time preparing to see the patient (eg, review of tests), Obtaining and/or reviewing separately obtained history, Documenting clinical information in the electronic or other health record, Independently interpreting results (not separately reported) and communicating results to the patient/family/caregiver, or Care coordination (not separately reported).         Each patient to whom he or she provides medical services by telemedicine is:  (1) informed of the relationship between the physician and patient and the respective role of any other health care provider with respect to management of the patient; and (2) notified that he or she may decline to receive medical services by telemedicine and may withdraw from such care at any time.    Notes:

## 2024-09-25 NOTE — PATIENT INSTRUCTIONS
1. Change Stelara to every 6 week injections  2. Labs in early December   3. Submit stool test in early March   4. Schedule appointment with dietitian   5. Follow up in 6 months

## 2024-10-21 ENCOUNTER — NUTRITION (OUTPATIENT)
Dept: GASTROENTEROLOGY | Facility: CLINIC | Age: 54
End: 2024-10-21
Payer: COMMERCIAL

## 2024-10-21 DIAGNOSIS — Z71.9 ENCOUNTER FOR HEALTH EDUCATION: Primary | ICD-10-CM

## 2024-10-21 NOTE — PROGRESS NOTES
"Referring Provider: Bran Braeux MD  Reason for Nutrition Referral: Weight Management     Patient Information: Raiza Cherry 54 y.o.-year-old White female   Nutrition-related concerns: Pt presents for UC, weight management concerns  Gained 2-3 pounds in the past year  Pt is now dealing with constipation    A = Nutrition Assessment  Anthropometric Data Estimated body mass index is 19.97 kg/m² as calculated from the following:    Height as of 9/25/24: 5' 5" (1.651 m).    Weight as of 9/25/24: 54.4 kg (120 lb).    Last 3 Weights:   Wt Readings from Last 3 Encounters:   09/25/24 54.4 kg (120 lb)   08/05/24 54 kg (119 lb)   07/05/24 54 kg (119 lb)      Biochemical Data Labs:   Lab Results   Component Value Date    YEPQSMOP52RT 62 03/02/2023    IKHCTCSZ62 >2000 (H) 07/29/2022     Lab Results   Component Value Date    HGB 13.3 06/07/2024    HCT 42.6 06/07/2024     Lab Results   Component Value Date    ALBUMIN 4.1 06/07/2024      Dietary Data  Appetite: Good  Dietary Intake:  Breakfast:  Drinks coffee frequently  Other   Pt eats a plant-forward diet, limited meat intake (doesn't eat red meat)  Eats chicken, salmon, greek yogurt  Cheese, nuts  [place] milk or oat milk in coffees     Other Data:  Supplements/ MVI:recommend a MVI                       Food Allergies/intolerances: none  Dx: UC; looking for weight management     D = Nutrition Diagnosis   Patient Assessment:   Patient knowledge of healthy eating patterns was assessed to be adequate. Session was spent educating patient on general, healthy nutrition therapy as well as the Mediterranean Diet. Emphasis placed on lean protein sources to help boost metabolism and maintain lean body mass.  Advised patient of potential need to add nutrition supplement beverages 1x daily to aid in meeting protein intake goal of 75g/day.  Patient verbalized understanding.  Provided RD contact information for concerns or questions. Expect adequate compliance with dietary " recommendations at this time.     Education Materials provided:   1. Academy of Nutrition and Dietetics/NCM: General, Healthful Nutrition Therapy  2. Academy of Nutrition and Dietetics/NCM: General, Healthful Mediterranean Nutrition Therapy  3. [place]: Tips for Adding Protein  4. [facility]: Grocery Shopping Guide     I = Nutrition Intervention  Calorie Requirements: 7785-8923 kcal/day (25-30 kcal/kg) *using IBW  Protein requirements: 75-85 g/day (1.0-1.1 g/kg) *using IBW  Recommendation #1 Create nutrient-dense meals and snacks. Focus on adequate nutrition including lean proteins, whole grains/fiber, and increasing overall fruit/vegetable intake.    Recommendation #2 Eat small, balanced meals and snacks every 3-4 hours. Include a source of protein with all meals and snacks to optimize satiation and increase metabolism. Examples of protein sources provided during consult.   Recommendation #3 Gradually increase fiber to 25g/day.     Recommendation #4 Include more fruits/vegetables, whole grains, or low-fat dairy in daily food choices.    Recommendation #5 Follow up as necessary per weight changes and further education needs      M = Nutrition Monitoring   Indicator 1. Diet recall    Indicator 2. Weight/BMI      E= Nutrition Evaluation   Goal 1. Diet recall shows good compliance with recommendations reviewed during session    Goal 2. Weight shows trend towards goal of weight maintenance      Consultation Time: 35 Minutes  Follow Up: Patient provided with Dietitian contact number and advised to call or make future appointment if further consultation is needed/desired.    Communication with provider via Epic  Signature: Sophie Allison, MPH, RDN, LDN

## 2024-11-05 DIAGNOSIS — N95.1 MENOPAUSAL HOT FLUSHES: ICD-10-CM

## 2024-11-05 RX ORDER — ESTRADIOL/NORETHINDRONE ACETATE TRANSDERMAL SYSTEM .05; .14 MG/D; MG/D
PATCH, EXTENDED RELEASE TRANSDERMAL
Qty: 8 PATCH | Refills: 11 | Status: SHIPPED | OUTPATIENT
Start: 2024-11-05 | End: 2024-11-06 | Stop reason: SDUPTHER

## 2024-11-06 ENCOUNTER — PATIENT MESSAGE (OUTPATIENT)
Dept: OBSTETRICS AND GYNECOLOGY | Facility: CLINIC | Age: 54
End: 2024-11-06
Payer: COMMERCIAL

## 2024-11-06 DIAGNOSIS — N95.1 MENOPAUSAL HOT FLUSHES: ICD-10-CM

## 2024-11-06 RX ORDER — ESTRADIOL/NORETHINDRONE ACETATE TRANSDERMAL SYSTEM .05; .14 MG/D; MG/D
PATCH, EXTENDED RELEASE TRANSDERMAL
Qty: 8 PATCH | Refills: 5 | Status: SHIPPED | OUTPATIENT
Start: 2024-11-06

## 2024-12-11 ENCOUNTER — PATIENT MESSAGE (OUTPATIENT)
Dept: GASTROENTEROLOGY | Facility: CLINIC | Age: 54
End: 2024-12-11
Payer: COMMERCIAL

## 2024-12-12 ENCOUNTER — PATIENT MESSAGE (OUTPATIENT)
Dept: OBSTETRICS AND GYNECOLOGY | Facility: CLINIC | Age: 54
End: 2024-12-12
Payer: COMMERCIAL

## 2024-12-12 ENCOUNTER — PATIENT MESSAGE (OUTPATIENT)
Dept: GASTROENTEROLOGY | Facility: CLINIC | Age: 54
End: 2024-12-12
Payer: COMMERCIAL

## 2024-12-13 ENCOUNTER — LAB VISIT (OUTPATIENT)
Dept: LAB | Facility: HOSPITAL | Age: 54
End: 2024-12-13
Attending: INTERNAL MEDICINE
Payer: COMMERCIAL

## 2024-12-13 DIAGNOSIS — K51.311 ULCERATIVE RECTOSIGMOIDITIS WITH RECTAL BLEEDING: ICD-10-CM

## 2024-12-13 LAB
ALBUMIN SERPL BCP-MCNC: 4.1 G/DL (ref 3.5–5.2)
ALP SERPL-CCNC: 61 U/L (ref 40–150)
ALT SERPL W/O P-5'-P-CCNC: 13 U/L (ref 10–44)
ANION GAP SERPL CALC-SCNC: 7 MMOL/L (ref 8–16)
AST SERPL-CCNC: 15 U/L (ref 10–40)
BASOPHILS # BLD AUTO: 0.11 K/UL (ref 0–0.2)
BASOPHILS NFR BLD: 2.3 % (ref 0–1.9)
BILIRUB SERPL-MCNC: 0.5 MG/DL (ref 0.1–1)
BUN SERPL-MCNC: 14 MG/DL (ref 6–20)
CALCIUM SERPL-MCNC: 9.3 MG/DL (ref 8.7–10.5)
CHLORIDE SERPL-SCNC: 105 MMOL/L (ref 95–110)
CO2 SERPL-SCNC: 27 MMOL/L (ref 23–29)
CREAT SERPL-MCNC: 0.8 MG/DL (ref 0.5–1.4)
CRP SERPL-MCNC: <0.3 MG/L (ref 0–8.2)
DIFFERENTIAL METHOD BLD: ABNORMAL
EOSINOPHIL # BLD AUTO: 0.1 K/UL (ref 0–0.5)
EOSINOPHIL NFR BLD: 2.3 % (ref 0–8)
ERYTHROCYTE [DISTWIDTH] IN BLOOD BY AUTOMATED COUNT: 12.2 % (ref 11.5–14.5)
EST. GFR  (NO RACE VARIABLE): >60 ML/MIN/1.73 M^2
GLUCOSE SERPL-MCNC: 75 MG/DL (ref 70–110)
HCT VFR BLD AUTO: 40.3 % (ref 37–48.5)
HGB BLD-MCNC: 13 G/DL (ref 12–16)
IMM GRANULOCYTES # BLD AUTO: 0.01 K/UL (ref 0–0.04)
IMM GRANULOCYTES NFR BLD AUTO: 0.2 % (ref 0–0.5)
LYMPHOCYTES # BLD AUTO: 2 K/UL (ref 1–4.8)
LYMPHOCYTES NFR BLD: 41.5 % (ref 18–48)
MCH RBC QN AUTO: 32 PG (ref 27–31)
MCHC RBC AUTO-ENTMCNC: 32.3 G/DL (ref 32–36)
MCV RBC AUTO: 99 FL (ref 82–98)
MONOCYTES # BLD AUTO: 0.5 K/UL (ref 0.3–1)
MONOCYTES NFR BLD: 11.2 % (ref 4–15)
NEUTROPHILS # BLD AUTO: 2.1 K/UL (ref 1.8–7.7)
NEUTROPHILS NFR BLD: 42.5 % (ref 38–73)
NRBC BLD-RTO: 0 /100 WBC
PLATELET # BLD AUTO: 353 K/UL (ref 150–450)
PMV BLD AUTO: 10.1 FL (ref 9.2–12.9)
POTASSIUM SERPL-SCNC: 4.4 MMOL/L (ref 3.5–5.1)
PROT SERPL-MCNC: 6.9 G/DL (ref 6–8.4)
RBC # BLD AUTO: 4.06 M/UL (ref 4–5.4)
SODIUM SERPL-SCNC: 139 MMOL/L (ref 136–145)
WBC # BLD AUTO: 4.82 K/UL (ref 3.9–12.7)

## 2024-12-13 PROCEDURE — 80053 COMPREHEN METABOLIC PANEL: CPT | Performed by: INTERNAL MEDICINE

## 2024-12-13 PROCEDURE — 86140 C-REACTIVE PROTEIN: CPT | Performed by: INTERNAL MEDICINE

## 2024-12-13 PROCEDURE — 85025 COMPLETE CBC W/AUTO DIFF WBC: CPT | Performed by: INTERNAL MEDICINE

## 2024-12-13 PROCEDURE — 36415 COLL VENOUS BLD VENIPUNCTURE: CPT | Performed by: INTERNAL MEDICINE

## 2024-12-19 ENCOUNTER — TELEPHONE (OUTPATIENT)
Dept: OBSTETRICS AND GYNECOLOGY | Facility: CLINIC | Age: 54
End: 2024-12-19

## 2024-12-19 ENCOUNTER — OFFICE VISIT (OUTPATIENT)
Dept: OBSTETRICS AND GYNECOLOGY | Facility: CLINIC | Age: 54
End: 2024-12-19
Payer: COMMERCIAL

## 2024-12-19 VITALS — HEIGHT: 65 IN | BODY MASS INDEX: 19.47 KG/M2 | WEIGHT: 116.88 LBS

## 2024-12-19 DIAGNOSIS — N63.23 MASS OF LOWER OUTER QUADRANT OF LEFT BREAST: ICD-10-CM

## 2024-12-19 DIAGNOSIS — N95.0 PMB (POSTMENOPAUSAL BLEEDING): ICD-10-CM

## 2024-12-19 DIAGNOSIS — Z01.419 ENCOUNTER FOR GYNECOLOGICAL EXAMINATION: Primary | ICD-10-CM

## 2024-12-19 PROCEDURE — 99999 PR PBB SHADOW E&M-EST. PATIENT-LVL IV: CPT | Mod: PBBFAC,,, | Performed by: OBSTETRICS & GYNECOLOGY

## 2024-12-19 RX ORDER — CIPROFLOXACIN HYDROCHLORIDE 3 MG/ML
1 SOLUTION/ DROPS OPHTHALMIC 4 TIMES DAILY
COMMUNITY
Start: 2024-11-08

## 2024-12-19 RX ORDER — PREDNISONE 5 MG/1
TABLET ORAL
COMMUNITY
Start: 2024-12-17

## 2024-12-19 RX ORDER — DOXYCYCLINE 100 MG/1
1 TABLET ORAL 2 TIMES DAILY
COMMUNITY
Start: 2024-10-08

## 2024-12-19 RX ORDER — IBUPROFEN 600 MG/1
600 TABLET ORAL EVERY 6 HOURS PRN
COMMUNITY
Start: 2024-10-08

## 2024-12-19 RX ORDER — BRINZOLAMIDE/BRIMONIDINE TARTRATE 10; 2 MG/ML; MG/ML
SUSPENSION/ DROPS OPHTHALMIC
COMMUNITY
Start: 2024-10-17

## 2024-12-19 RX ORDER — PREDNISOLONE ACETATE 10 MG/ML
1 SUSPENSION/ DROPS OPHTHALMIC 4 TIMES DAILY
COMMUNITY
Start: 2024-11-08

## 2024-12-19 RX ORDER — TRAMADOL HYDROCHLORIDE 50 MG/1
50 TABLET ORAL EVERY 4 HOURS PRN
COMMUNITY
Start: 2024-10-08

## 2024-12-19 NOTE — PROGRESS NOTES
Chief Complaint Well woman exam:  Annual Exam    She is established    Raiza Cherry is a 54 y.o. female  presents for a well woman exam.    Pt is Postmenopausal and on Combipatch ( pt preference)  C/o left breast pain off and on the past week with a palp ridge of tissue  ALso had a few days of spotting a month ago    Prev hyst D&C for benign polyps in Dec 2023  Final Pathologic Diagnosis 1. UTERINE SCRAPINGS:  - No endometrial tissue definitively identified.  - Benign endocervical tissue.  - No dysplasia or malignancy.    2. ENDOMETRIUM, POLYP, CURETTAGE:  - Benign endometrial polyp.  - Abundant benign smooth muscle, cannot exclude possible associated benign submucosal leiomyoma.  - No hyperplasia, atypia, or malignancy.   Comment: Interp By Reyna Henderson MD, Signed on 2023 at 15:49       Review of Systems - :   No abdominal pain, No vaginal bleeding or discharge,   No breast pain or masses, No rectal bleeding     Meds by MD:  LMP: Patient's last menstrual period was 2018.    Last pap: 2023 normal HPV neg   Last MM2024 Birads 1 TC &%  Last colonoscopy:  [facility] repeat one year      Past Medical History:   Diagnosis Date    Abnormal Pap smear of cervix     LEEP ,  LGSIL, colpo  mildly abnl cells,     Cataract     Chronic diarrhea     Glaucoma     Ulcerative colitis     proctitis       Past Surgical History:   Procedure Laterality Date    AUGMENTATION OF BREAST      implants were deflated by a doctor and will be removed 2022    BREAST SURGERY  ,     enlargement and lift of both breasts    BREAST SURGERY Bilateral 2022    implants removed    CATARACT EXTRACTION W/  INTRAOCULAR LENS IMPLANT Right 10/06/2021    DR.ELLEN BULLOCK    CATARACT EXTRACTION W/  INTRAOCULAR LENS IMPLANT Left     DR.ELLEN BULLOCK    CERVICAL BIOPSY  W/ LOOP ELECTRODE EXCISION  2016    Moderate dysplasia completely excised margins clear and ECC negative    COLONOSCOPY  N/A 2022    Procedure: COLONOSCOPY;  Surgeon: Bran Breaux MD;  Location: [facility] ENDO (4TH FLR);  Service: Endoscopy;  Laterality: N/A;  fully vaccinated 3/27/21, instructions sent to [facility]      COVID test at [facility] on -GT    COLONOSCOPY N/A 2024    Procedure: COLONOSCOPY;  Surgeon: Bran Breaux MD;  Location: [facility] ENDO (4TH FLR);  Service: Endoscopy;  Laterality: N/A;  ref by Saeid, per pt [place], [facility]3]   pre call complete-RB    FLEXIBLE SIGMOIDOSCOPY N/A 02/15/2023    Procedure: SIGMOIDOSCOPY-FLEXIBLE;  Surgeon: Bran Breaux MD;  Location: [facility] OMAR (4TH FLR);  Service: Endoscopy;  Laterality: N/A;  instr emailed -ml  Pre call done did not answer 23-DB    HYSTEROSCOPIC POLYPECTOMY OF UTERUS N/A 2023    Procedure: POLYPECTOMY, UTERUS, HYSTEROSCOPIC;  Surgeon: Estuardo Robles MD;  Location: [facility] OR;  Service: OB/GYN;  Laterality: N/A;  Deficit-75  Total- 150    MASTOPEXY Bilateral 2022    Procedure: MASTOPEXY FULL WITH IMPLANT BAG REMOVAL;  Surgeon: Armen Reynolds Jr., MD;  Location: [facility] OR;  Service: Plastics;  Laterality: Bilateral;  2HRS    TOTAL REDUCTION MAMMOPLASTY  2022    lift procedure and implants removed       OB History    Para Term  AB Living   3 3 3     3   SAB IAB Ectopic Multiple Live Births           3      # Outcome Date GA Lbr Jordon/2nd Weight Sex Type Anes PTL Lv   3 Term 10/24/07 40w0d  3.204 kg (7 lb 1 oz) M Vag-Spont EPI  KM   2 Term 96 40w0d  3.43 kg (7 lb 9 oz) F Vag-Spont EPI  KM   1 Term 04/15/93 40w0d  3.09 kg (6 lb 13 oz) F Vag-Spont EPI  KM      Obstetric Comments   Menarche ~ 11       Family History   Problem Relation Name Age of Onset    Lung disease Father      Glaucoma Father      Diabetes Mother      Heart disease Mother      Lupus Sister      Asthma Sister      No Known Problems Sister      No Known Problems Sister      No Known Problems Son      No Known Problems Daughter       "No Known Problems Daughter      Breast cancer Neg Hx      Colon cancer Neg Hx      Ovarian cancer Neg Hx      Cancer Neg Hx      Amblyopia Neg Hx      Blindness Neg Hx      Macular degeneration Neg Hx      Retinal detachment Neg Hx      Strabismus Neg Hx         Social History     Tobacco Use    Smoking status: Never    Smokeless tobacco: Never   Substance Use Topics    Alcohol use: Not Currently    Drug use: No           Physical Exam:  Ht 5' 5" (1.651 m)   Wt 53 kg (116 lb 13.5 oz)   LMP 04/06/2018   BMI 19.44 kg/m²     APPEARANCE: Well nourished, well developed, in no acute distress.  AFFECT: WNL, alert and oriented x 3  SKIN: No acne or hirsutism  BREASTS: Symmetrical, no skin changes.                     No nipple discharge.   RIGHT: No palpable masses   LEFT: elongated palp oval 2cm x 0.5 cm ridge of tissue at level of areola edge from 4-6 o'clock    NODES: No inguinal nor axillary LAD  ABDOMEN: soft Non tender Non distended No masses  PELVIC: Female chaperone was present in the room during pelvic exam.   Normal external genitalia without lesions.   Normal urethral meatus.    No signif cystocele or rectocele.  Vagina atrophic without lesions or discharge.    Cervix atrophic, without lesions, discharge or tenderness.  No pap done   Bimanual exam shows uterus to be normal size, regular, mobile and nontender.    Adnexa without masses or tenderness.    EXTREMITIES: No edema.    ASSESSMENT AND PLAN    Raiza was seen today for annual exam.    Diagnoses and all orders for this visit:    Encounter for gynecological examination   PAP and MMG UTD    Mass of lower outer quadrant of left breast-LEFT: elongated palp oval 2cm x 0.5 cm ridge of tissue at level of areola edge from 4-6 o'clock  -     US Breast Left Complete; Future    PMB (postmenopausal bleeding)- will err on side of caution and repeat u/s  -     US Pelvis Comp with Transvag NON-OB (xpd; Future            Patient was counseled today on A.C.S. Pap " guidelines and recommendations for yearly pelvic exams, mammograms and monthly self breast exams; to see her PCP for other health maintenance.     Patient encouraged to register for portal and results will be sent via portal.    No follow-ups on file.         Health Maintenance   Topic Date Due    COVID-19 Vaccine (4 - 2024-25 season) 09/01/2024    Cervical Cancer Screening  11/02/2024    Colorectal Cancer Screening  07/05/2025    Mammogram  08/05/2025    Pneumococcal Vaccines (Age 0-64) (3 of 3 - PPSV23 or PCV20) 02/04/2027    Lipid Panel  03/27/2029    TETANUS VACCINE  09/23/2031    RSV Vaccine (Age 60+ and Pregnant patients) (1 - 1-dose 75+ series) 01/17/2043    Hepatitis C Screening  Completed    Shingles Vaccine  Completed    Influenza Vaccine  Completed    HIV Screening  Completed

## 2024-12-19 NOTE — PROGRESS NOTES
LMP: Patient's last menstrual period was 2018..    Meds per MD: Combi Patch    Last Pap:  pap & hpv negative  Last MM2024 birads 1, T-C 7%  Last Colonoscopy:  [facility], repeat one year

## 2024-12-23 DIAGNOSIS — K51.311 ULCERATIVE RECTOSIGMOIDITIS WITH RECTAL BLEEDING: ICD-10-CM

## 2024-12-23 RX ORDER — USTEKINUMAB 90 MG/ML
90 INJECTION, SOLUTION SUBCUTANEOUS
Qty: 1 ML | Refills: 2 | Status: SHIPPED | OUTPATIENT
Start: 2024-12-23

## 2024-12-23 NOTE — TELEPHONE ENCOUNTER
"Primary provider: Dr. Bran Breaux    IBD medications: Stelara 90 mg SC Q 6 weeks (Dose changed to Q 6 weeks from Q 4 weeks at last office visit 9/25/24     Refill request for:      Pended Medication(s)   Requested Prescriptions     Pending Prescriptions Disp Refills    STELARA 90 mg/mL Syrg syringe [Pharmacy Med Name: STELARA  90MG PREFILLED SYRINGE  ML PFS] 1 mL 5     Sig: INJECT 1 SYRINGE SUBCUTANEOUSLY  EVERY 4 WEEKS           Allergies reviewed: Yes    Drug Monitoring labs/frequency for all IBD meds:    CBC and CMP every 6 months  TB and Hep B every 12 months    Lab Results   Component Value Date    HEPBSAG Non-reactive 06/07/2024    HEPBCAB Non-reactive 06/07/2024     Lab Results   Component Value Date    TBGOLDPLUS Negative 06/12/2024     No results found for: "QUANTNILVALU", "QUANTIFERON", "QUANTTBGDPL"   No results found for: "TSPOTSCREN"  Lab Results   Component Value Date    LRGRTGDS07ZG 62 03/02/2023    JBTVVTVT04 >2000 (H) 07/29/2022     Lab Results   Component Value Date    WBC 4.82 12/13/2024    HGB 13.0 12/13/2024    HCT 40.3 12/13/2024    MCV 99 (H) 12/13/2024     12/13/2024     Lab Results   Component Value Date    CREATININE 0.8 12/13/2024    ALBUMIN 4.1 12/13/2024    BILITOT 0.5 12/13/2024    ALKPHOS 61 12/13/2024    AST 15 12/13/2024    ALT 13 12/13/2024       Lab due date (mo/yr):  6/25    Labs scheduled: No - but patient has an OV before or when labs are due     Next appt: 3/12/25    RX refill sent to provider for amount until next labs: Yes     Note from LOV 9/25/24      "

## 2024-12-31 ENCOUNTER — HOSPITAL ENCOUNTER (OUTPATIENT)
Dept: RADIOLOGY | Facility: HOSPITAL | Age: 54
Discharge: HOME | End: 2024-12-31
Attending: OBSTETRICS & GYNECOLOGY
Payer: COMMERCIAL

## 2024-12-31 DIAGNOSIS — N63.23 MASS OF LOWER OUTER QUADRANT OF LEFT BREAST: ICD-10-CM

## 2024-12-31 PROCEDURE — 76642 ULTRASOUND BREAST LIMITED: CPT | Mod: TC,LT

## 2024-12-31 PROCEDURE — 76642 ULTRASOUND BREAST LIMITED: CPT | Mod: 26,LT,, | Performed by: RADIOLOGY

## 2024-12-31 NOTE — PROGRESS NOTES
U/s left Normal  Birads 1  TC 7%    BI-RADS Category 1: Negative Finding    Recommendation:  Return to annual screening mammogram schedule is recommended

## 2025-01-06 ENCOUNTER — PATIENT MESSAGE (OUTPATIENT)
Dept: OBSTETRICS AND GYNECOLOGY | Facility: CLINIC | Age: 55
End: 2025-01-06
Payer: COMMERCIAL

## 2025-01-06 DIAGNOSIS — Z78.0 MENOPAUSE: Primary | ICD-10-CM

## 2025-01-06 RX ORDER — PROGESTERONE 200 MG/1
200 CAPSULE ORAL NIGHTLY
Qty: 90 CAPSULE | Refills: 3 | Status: SHIPPED | OUTPATIENT
Start: 2025-01-06 | End: 2026-01-06

## 2025-01-08 ENCOUNTER — DOCUMENTATION ONLY (OUTPATIENT)
Dept: GASTROENTEROLOGY | Facility: CLINIC | Age: 55
End: 2025-01-08
Payer: COMMERCIAL

## 2025-01-08 NOTE — PROGRESS NOTES
Signed form to confirm proper dose of stelara (90mg Q6W) faxed to [facility]. Successful fax transmittal e-mail received.

## 2025-01-17 ENCOUNTER — TELEPHONE (OUTPATIENT)
Dept: GASTROENTEROLOGY | Facility: CLINIC | Age: 55
End: 2025-01-17
Payer: COMMERCIAL

## 2025-01-17 NOTE — TELEPHONE ENCOUNTER
"Contacted [facility] for update on "attempt to contact" fax      -[facility] automated "order status" confirmed delivery of pt STELARA 90mg/ml  -Delivery date: 1/17/2025   "

## 2025-01-27 ENCOUNTER — HOSPITAL ENCOUNTER (OUTPATIENT)
Dept: RADIOLOGY | Facility: HOSPITAL | Age: 55
Discharge: HOME | End: 2025-01-27
Attending: INTERNAL MEDICINE
Payer: COMMERCIAL

## 2025-01-27 ENCOUNTER — OFFICE VISIT (OUTPATIENT)
Dept: OBSTETRICS AND GYNECOLOGY | Facility: CLINIC | Age: 55
End: 2025-01-27
Payer: COMMERCIAL

## 2025-01-27 VITALS — HEIGHT: 65 IN | BODY MASS INDEX: 20.2 KG/M2 | WEIGHT: 121.25 LBS

## 2025-01-27 DIAGNOSIS — N95.0 PMB (POSTMENOPAUSAL BLEEDING): Primary | ICD-10-CM

## 2025-01-27 PROCEDURE — 99213 OFFICE O/P EST LOW 20 MIN: CPT | Mod: S$GLB,,, | Performed by: OBSTETRICS & GYNECOLOGY

## 2025-01-27 PROCEDURE — 1159F MED LIST DOCD IN RCRD: CPT | Mod: CPTII,S$GLB,, | Performed by: OBSTETRICS & GYNECOLOGY

## 2025-01-27 PROCEDURE — 99999 PR PBB SHADOW E&M-EST. PATIENT-LVL III: CPT | Mod: PBBFAC,,, | Performed by: OBSTETRICS & GYNECOLOGY

## 2025-01-27 PROCEDURE — 3008F BODY MASS INDEX DOCD: CPT | Mod: CPTII,S$GLB,, | Performed by: OBSTETRICS & GYNECOLOGY

## 2025-01-27 PROCEDURE — 77080 DXA BONE DENSITY AXIAL: CPT | Mod: TC

## 2025-01-27 PROCEDURE — 77080 DXA BONE DENSITY AXIAL: CPT | Mod: 26,,, | Performed by: INTERNAL MEDICINE

## 2025-01-27 NOTE — PROGRESS NOTES
Subjective:       Patient ID: Raiza Cherry is a 55 y.o. female.    Chief Complaint:  HRT follow-up      History of Present Illness  56 y/o no VB since last visit   Currently cutting Combipatch in 1/2  Did  Prometrium 200mg but has never taken it- anxious as taking a new med for hair loss and did not want to start anything new  Would like to continue this dose and see how she does - if this VB recurs then she is willing to Add prometrium to combipatch   Originally planned for vivelle plus prometrium   Pt understands - and if VB keep recurring will need another bx  Had D&C last 12/23 and has only had spotting in Nov and Dec 2024         GYN & OB History  Patient's last menstrual period was 04/06/2018.   Date of Last Pap: 11/9/2023       Assessment/ Plan:          Raiza was seen today for hrt follow-up.    Diagnoses and all orders for this visit:    PMB (postmenopausal bleeding)- spotting in Nov and Dec   U/s with EMS 4mm  Prev Bx 2023   Now resolved   Will watch and if recurs pt to let me know and will add Prometrium          Health Maintenance         Date Due Completion Date    COVID-19 Vaccine (4 - 2024-25 season) 09/01/2024 12/4/2021    Cervical Cancer Screening 11/02/2024 11/2/2023    Colorectal Cancer Screening 07/05/2025 7/5/2024    Mammogram 08/05/2025 8/5/2024    Override on 2/3/2018: Done    Override on 2/2/2017: Done    Pneumococcal Vaccines (Age 50+) (3 of 3 - PPSV23, PCV20 or PCV21) 02/04/2027 2/4/2022    Lipid Panel 03/27/2029 3/27/2024    TETANUS VACCINE 09/23/2031 9/23/2021    RSV Vaccine (Age 60+ and Pregnant patients) (1 - 1-dose 75+ series) 01/17/2043 ---

## 2025-02-14 ENCOUNTER — PATIENT MESSAGE (OUTPATIENT)
Dept: OBSTETRICS AND GYNECOLOGY | Facility: CLINIC | Age: 55
End: 2025-02-14
Payer: COMMERCIAL

## 2025-02-14 DIAGNOSIS — Z78.0 MENOPAUSE: Primary | ICD-10-CM

## 2025-02-18 RX ORDER — PROGESTERONE 100 MG/1
100 CAPSULE ORAL NIGHTLY
Qty: 90 CAPSULE | Refills: 3 | Status: SHIPPED | OUTPATIENT
Start: 2025-02-18 | End: 2026-02-18

## 2025-03-05 ENCOUNTER — TELEPHONE (OUTPATIENT)
Dept: GASTROENTEROLOGY | Facility: CLINIC | Age: 55
End: 2025-03-05
Payer: COMMERCIAL

## 2025-03-05 NOTE — TELEPHONE ENCOUNTER
"Contacted [facility] for update on "attempt to contact" fax   [facility] automated "order status" confirmed delivery set up for pt (STELARA 90 mg/mL Syrg)     Ship Date: 3/5/2025    "

## 2025-03-12 ENCOUNTER — PATIENT MESSAGE (OUTPATIENT)
Dept: GASTROENTEROLOGY | Facility: CLINIC | Age: 55
End: 2025-03-12

## 2025-03-12 ENCOUNTER — OFFICE VISIT (OUTPATIENT)
Dept: GASTROENTEROLOGY | Facility: CLINIC | Age: 55
End: 2025-03-12
Payer: COMMERCIAL

## 2025-03-12 VITALS — BODY MASS INDEX: 19.99 KG/M2 | WEIGHT: 120 LBS | HEIGHT: 65 IN

## 2025-03-12 DIAGNOSIS — K59.09 CONSTIPATION, CHRONIC: ICD-10-CM

## 2025-03-12 DIAGNOSIS — L65.9 HAIR LOSS: ICD-10-CM

## 2025-03-12 DIAGNOSIS — D84.821 IMMUNOSUPPRESSION DUE TO DRUG THERAPY: ICD-10-CM

## 2025-03-12 DIAGNOSIS — Z79.899 IMMUNOSUPPRESSION DUE TO DRUG THERAPY: ICD-10-CM

## 2025-03-12 DIAGNOSIS — K51.311 ULCERATIVE RECTOSIGMOIDITIS WITH RECTAL BLEEDING: Primary | ICD-10-CM

## 2025-03-12 RX ORDER — RITLECITINIB 50 MG/1
CAPSULE ORAL
COMMUNITY
Start: 2025-02-07

## 2025-03-12 NOTE — PATIENT INSTRUCTIONS
1. Continue Stelara  2. Submit stool test in the near future   3. Labs in June   4. Follow up in 6 months

## 2025-03-18 ENCOUNTER — LAB VISIT (OUTPATIENT)
Dept: LAB | Facility: HOSPITAL | Age: 55
End: 2025-03-18
Attending: INTERNAL MEDICINE
Payer: COMMERCIAL

## 2025-03-18 DIAGNOSIS — K51.311 ULCERATIVE RECTOSIGMOIDITIS WITH RECTAL BLEEDING: ICD-10-CM

## 2025-03-18 PROCEDURE — 83993 ASSAY FOR CALPROTECTIN FECAL: CPT | Performed by: INTERNAL MEDICINE

## 2025-03-19 LAB
CALPROTECTIN INTERPRETATION: NORMAL
CALPROTECTIN: 7

## 2025-03-20 ENCOUNTER — RESULTS FOLLOW-UP (OUTPATIENT)
Dept: GASTROENTEROLOGY | Facility: HOSPITAL | Age: 55
End: 2025-03-20

## 2025-04-15 ENCOUNTER — CLINICAL SUPPORT (OUTPATIENT)
Dept: OTHER | Facility: CLINIC | Age: 55
End: 2025-04-15

## 2025-04-15 DIAGNOSIS — Z00.8 ENCOUNTER FOR OTHER GENERAL EXAMINATION: ICD-10-CM

## 2025-04-16 VITALS
DIASTOLIC BLOOD PRESSURE: 80 MMHG | WEIGHT: 117 LBS | BODY MASS INDEX: 19.49 KG/M2 | HEIGHT: 65 IN | SYSTOLIC BLOOD PRESSURE: 134 MMHG

## 2025-04-16 NOTE — PRE-PROCEDURE INSTRUCTIONS
12/26 1344 Provided updated arrival time, 0515. No complaints voiced, verbalized an understanding.    The following was discussed with pt via phone and sent to pt portal. Pt verbalized understanding. All PreAdmit questions answered and satisfied. No complaints or concerns voiced.    Dear Raiza ,     You are scheduled for a procedure with Dr. Robles on 12/27/2023. Your scheduled arrival time is 7:27am.  This arrival time is roughly 2 hours before your anticipated procedure time to allow sufficient time for pre-op.  Please wear comfortable clothes.  This procedure will take place at the [facility] at the corner of [facility] and [facility].  [facility] [place] [facility] to [facility].  The address is:     [address] [place] [address].  [place]A Nieto [id number]     After entering the building, you will proceed to the second floor where you can check in with registration. You should take any medications that you routinely take for blood pressure (other than those listed below), heart medications, thyroid, cholesterol, etc.      If you wear contact lenses, please wear glasses to your procedure.     Your fasting instructions are as follow:  Nothing to eat after midnight tonight, 12/26/2023. You may drink clear liquids (such as water, apple juice) up until 2 hours prior to your arrival time. You MUST have a responsible adult to bring you home.        The evening prior to your procedure and the morning of, please hold the following medications:  -Aspirin and Aspirin-containing products (Goody's powder, Excedrin)  -NSAIDs (Advil, Ibuprofen, Aleve, Diclofenac)  -Vitamins/Supplements  -Herbal remedies/Teas  -Stimulants (Adderall, Vyvanse, Adipex)  -Diabetic medication (Please bring with you day of procedure)  -VITAMINS  -CLOBEX SHAMPOO  -CYCLOSPORINE ORAL  -FLUOCINOLONE SHOWER CAP  -LIALDA  -MINOXIDIL  -TIMOLOL EYE DROP  -RETIN A  -STELARA     -May take Tylenol        The evening prior to your  procedure and the morning of your procedure, take a shower using antibacterial soap (ex: Hibiclens or Dial antibacterial soap). DO NOT apply deodorant, lotion, cologne, or anything else to the skin. Wear loose, comfortable fitting clothing. Do not wear jewelry or bring any valuables with you. If you wear dentures or contacts, please bring your case with you or leave them at home. Use and bring any inhalers that you may have.     If you have any procedure-specific questions, please call your surgeon's office. Any other questions, don't hesitate to call at [phone].     Thanks,  CIPRIANO Stevens  Pre-Admit Dept [facility]           General Sunscreen Counseling: I recommended a broad spectrum sunscreen with a SPF of 30 or higher.  I explained that SPF 30 sunscreens block approximately 97 percent of the sun's harmful rays.  Sunscreens should be applied at least 15 minutes prior to expected sun exposure and then every 2 hours after that as long as sun exposure continues. If swimming or exercising sunscreen should be reapplied every 45 minutes to an hour after getting wet or sweating.  One ounce, or the equivalent of a shot glass full of sunscreen, is adequate to protect the skin not covered by a bathing suit. I also recommended a lip balm with a sunscreen as well. Sun protective clothing can be used in lieu of sunscreen but must be worn the entire time you are exposed to the sun's rays. Detail Level: Generalized

## 2025-04-17 LAB
GLUCOSE SERPL-MCNC: 92 MG/DL (ref 60–140)
HDLC SERPL-MCNC: 65 MG/DL
POC CHOLESTEROL, LDL (DOCK): 169 MG/DL
POC CHOLESTEROL, TOTAL: 245 MG/DL
TRIGL SERPL-MCNC: 66 MG/DL

## 2025-04-21 ENCOUNTER — RESULTS FOLLOW-UP (OUTPATIENT)
Dept: OTHER | Facility: CLINIC | Age: 55
End: 2025-04-21

## 2025-05-14 ENCOUNTER — CLINICAL SUPPORT (OUTPATIENT)
Dept: INTERNAL MEDICINE | Facility: CLINIC | Age: 55
End: 2025-05-14
Payer: COMMERCIAL

## 2025-05-14 DIAGNOSIS — Z13.29 SCREENING FOR HYPOTHYROIDISM: ICD-10-CM

## 2025-05-14 DIAGNOSIS — Z13.1 SCREENING FOR DIABETES MELLITUS: ICD-10-CM

## 2025-05-14 DIAGNOSIS — Z13.6 ENCOUNTER FOR SCREENING FOR CARDIOVASCULAR DISORDERS: ICD-10-CM

## 2025-05-14 DIAGNOSIS — Z00.00 ANNUAL PHYSICAL EXAM: Primary | ICD-10-CM

## 2025-05-14 LAB
ABSOLUTE EOSINOPHIL (OHS): 0.17 K/UL
ABSOLUTE MONOCYTE (OHS): 0.6 K/UL (ref 0.3–1)
ABSOLUTE NEUTROPHIL COUNT (OHS): 2.4 K/UL (ref 1.8–7.7)
ALBUMIN SERPL BCP-MCNC: 4.2 G/DL (ref 3.5–5.2)
ALP SERPL-CCNC: 74 UNIT/L (ref 40–150)
ALT SERPL W/O P-5'-P-CCNC: 13 UNIT/L (ref 10–44)
ANION GAP (OHS): 10 MMOL/L (ref 8–16)
AST SERPL-CCNC: 18 UNIT/L (ref 11–45)
BASOPHILS # BLD AUTO: 0.09 K/UL
BASOPHILS NFR BLD AUTO: 1.5 %
BILIRUB SERPL-MCNC: 0.5 MG/DL (ref 0.1–1)
BUN SERPL-MCNC: 12 MG/DL (ref 6–20)
CALCIUM SERPL-MCNC: 9.2 MG/DL (ref 8.7–10.5)
CHLORIDE SERPL-SCNC: 105 MMOL/L (ref 95–110)
CHOLEST SERPL-MCNC: 232 MG/DL (ref 120–199)
CHOLEST/HDLC SERPL: 3.7 {RATIO} (ref 2–5)
CO2 SERPL-SCNC: 26 MMOL/L (ref 23–29)
CREAT SERPL-MCNC: 0.7 MG/DL (ref 0.5–1.4)
EAG (OHS): 103 MG/DL (ref 68–131)
ERYTHROCYTE [DISTWIDTH] IN BLOOD BY AUTOMATED COUNT: 11.9 % (ref 11.5–14.5)
GFR SERPLBLD CREATININE-BSD FMLA CKD-EPI: >60 ML/MIN/1.73/M2
GLUCOSE SERPL-MCNC: 78 MG/DL (ref 70–110)
HBA1C MFR BLD: 5.2 % (ref 4–5.6)
HCT VFR BLD AUTO: 41 % (ref 37–48.5)
HDLC SERPL-MCNC: 63 MG/DL (ref 40–75)
HDLC SERPL: 27.2 % (ref 20–50)
HGB BLD-MCNC: 13.4 GM/DL (ref 12–16)
IMM GRANULOCYTES # BLD AUTO: 0.01 K/UL (ref 0–0.04)
IMM GRANULOCYTES NFR BLD AUTO: 0.2 % (ref 0–0.5)
LDLC SERPL CALC-MCNC: 152.8 MG/DL (ref 63–159)
LYMPHOCYTES # BLD AUTO: 2.71 K/UL (ref 1–4.8)
MCH RBC QN AUTO: 31.8 PG (ref 27–31)
MCHC RBC AUTO-ENTMCNC: 32.7 G/DL (ref 32–36)
MCV RBC AUTO: 97 FL (ref 82–98)
NONHDLC SERPL-MCNC: 169 MG/DL
NUCLEATED RBC (/100WBC) (OHS): 0 /100 WBC
PLATELET # BLD AUTO: 285 K/UL (ref 150–450)
PMV BLD AUTO: 10.3 FL (ref 9.2–12.9)
POTASSIUM SERPL-SCNC: 3.7 MMOL/L (ref 3.5–5.1)
PROT SERPL-MCNC: 7.3 GM/DL (ref 6–8.4)
RBC # BLD AUTO: 4.22 M/UL (ref 4–5.4)
RELATIVE EOSINOPHIL (OHS): 2.8 %
RELATIVE LYMPHOCYTE (OHS): 45.3 % (ref 18–48)
RELATIVE MONOCYTE (OHS): 10 % (ref 4–15)
RELATIVE NEUTROPHIL (OHS): 40.2 % (ref 38–73)
SODIUM SERPL-SCNC: 141 MMOL/L (ref 136–145)
TRIGL SERPL-MCNC: 81 MG/DL (ref 30–150)
TSH SERPL-ACNC: 1.8 UIU/ML (ref 0.4–4)
WBC # BLD AUTO: 5.98 K/UL (ref 3.9–12.7)

## 2025-05-14 PROCEDURE — 83036 HEMOGLOBIN GLYCOSYLATED A1C: CPT

## 2025-05-14 PROCEDURE — 84443 ASSAY THYROID STIM HORMONE: CPT

## 2025-05-14 PROCEDURE — 82465 ASSAY BLD/SERUM CHOLESTEROL: CPT

## 2025-05-14 PROCEDURE — 80053 COMPREHEN METABOLIC PANEL: CPT

## 2025-05-14 PROCEDURE — 85025 COMPLETE CBC W/AUTO DIFF WBC: CPT

## 2025-05-14 PROCEDURE — 83695 ASSAY OF LIPOPROTEIN(A): CPT

## 2025-05-19 DIAGNOSIS — K51.311 ULCERATIVE RECTOSIGMOIDITIS WITH RECTAL BLEEDING: ICD-10-CM

## 2025-05-19 RX ORDER — USTEKINUMAB 90 MG/ML
90 INJECTION, SOLUTION SUBCUTANEOUS
Qty: 1 ML | Refills: 0 | Status: SHIPPED | OUTPATIENT
Start: 2025-05-19

## 2025-05-19 RX ORDER — USTEKINUMAB 90 MG/ML
INJECTION, SOLUTION SUBCUTANEOUS
Qty: 1 ML | Refills: 2 | OUTPATIENT
Start: 2025-05-19

## 2025-05-19 NOTE — TELEPHONE ENCOUNTER
"Primary provider: Dr. Bran Breaux    IBD medications: Stelara 90 mg SC Q 6 weeks    Refill request for:      Pended Medication(s)   Requested Prescriptions     Pending Prescriptions Disp Refills    ustekinumab (STELARA) 90 mg/mL Syrg syringe 1 mL 2     Sig: Inject 1 mL (90 mg total) into the skin every 6 weeks.     Refused Prescriptions Disp Refills    STELARA 90 mg/mL Syrg syringe [Pharmacy Med Name: STELARA  90MG PREFILLED SYRINGE  ML PFS] 1 mL 2     Sig: INJECT 1 SYRINGE SUBCUTANEOUSLY  EVERY 6 WEEKS     Refused By: MARGARITA COLON     Reason for Refusal: Medication never prescribed for the patient           Allergies reviewed: Yes    Drug Monitoring labs/frequency for all IBD meds:    CBC and CMP every 6 months  TB and Hep B every 12 months    Lab Results   Component Value Date    HEPBSAG Non-reactive 06/07/2024    HEPBCAB Non-reactive 06/07/2024     Lab Results   Component Value Date    TBGOLDPLUS Negative 06/12/2024     No results found for: "QUANTNILVALU", "QUANTIFERON", "QUANTTBGDPL"   No results found for: "TSPOTSCREN"  Lab Results   Component Value Date    HJUENTET93BZ 62 03/02/2023    URGNTIBE29 >2000 (H) 07/29/2022     Lab Results   Component Value Date    WBC 5.98 05/14/2025    HGB 13.4 05/14/2025    HCT 41.0 05/14/2025    MCV 97 05/14/2025     05/14/2025     Lab Results   Component Value Date    CREATININE 0.7 05/14/2025    ALBUMIN 4.2 05/14/2025    BILITOT 0.5 05/14/2025    ALKPHOS 74 05/14/2025    AST 18 05/14/2025    ALT 13 05/14/2025     Lab due date (mo/yr):  6/25    Labs scheduled: Yes     Next appt: 9/3/25    RX refill sent to provider for amount until next labs: Yes         "

## 2025-05-19 NOTE — TELEPHONE ENCOUNTER
Jen Thompson PharmD  P I[facility] Centralized Refill Staff Pool  Caller: [phone] (Today, 11:53 AM)         Previous Messages       ----- Message -----  From: Surekha Gonsalves, RN  Sent: 5/19/2025  12:17 PM CDT  To: [facility] Pharmacist  Subject: FW: Refill Request                                ----- Message -----  From: Ira Perez  Sent: 5/19/2025  11:57 AM CDT  To: Saeid MCKEON Staff  Subject: Refill Request                                  Refill Request    Is this a new Rx    Name and Strength:ustekinumab (STELARA) 90 mg/mL Syrg syringe      Preferred Pharmacy with phone number:    [facility] - [address] [place] [address]  Phone: [phone] Fax: [phone]        Name Of Caller: [facility]        Communication Preference: [phone]      Additional Information:  Pt is out of medication    Please Call to Advise,Thank you.

## 2025-05-20 ENCOUNTER — PATIENT MESSAGE (OUTPATIENT)
Dept: GASTROENTEROLOGY | Facility: CLINIC | Age: 55
End: 2025-05-20
Payer: COMMERCIAL

## 2025-05-29 ENCOUNTER — TELEPHONE (OUTPATIENT)
Dept: GASTROENTEROLOGY | Facility: CLINIC | Age: 55
End: 2025-05-29
Payer: COMMERCIAL

## 2025-05-29 NOTE — TELEPHONE ENCOUNTER
Received clarification fax from [facility] (Is pt currently on Litfulo 50mg cap & Stelara 90MG/ML)    Signed medication clarification forms faxed to [facility] on 5/27/2025 (yes, pt should continue both medications).       Successful fax transmittal e-mail received.   Fax # [phone]

## 2025-06-19 ENCOUNTER — TELEPHONE (OUTPATIENT)
Dept: GASTROENTEROLOGY | Facility: CLINIC | Age: 55
End: 2025-06-19
Payer: COMMERCIAL

## 2025-06-23 ENCOUNTER — TELEPHONE (OUTPATIENT)
Dept: GASTROENTEROLOGY | Facility: CLINIC | Age: 55
End: 2025-06-23
Payer: COMMERCIAL

## 2025-06-25 ENCOUNTER — LAB VISIT (OUTPATIENT)
Dept: LAB | Facility: HOSPITAL | Age: 55
End: 2025-06-25
Attending: INTERNAL MEDICINE
Payer: COMMERCIAL

## 2025-06-25 DIAGNOSIS — K51.311 ULCERATIVE RECTOSIGMOIDITIS WITH RECTAL BLEEDING: ICD-10-CM

## 2025-06-25 LAB
HBV CORE AB SERPL QL IA: NORMAL
HBV SURFACE AG SERPL QL IA: NORMAL

## 2025-06-25 PROCEDURE — 86480 TB TEST CELL IMMUN MEASURE: CPT

## 2025-06-25 PROCEDURE — 86704 HEP B CORE ANTIBODY TOTAL: CPT

## 2025-06-25 PROCEDURE — 36415 COLL VENOUS BLD VENIPUNCTURE: CPT

## 2025-06-25 PROCEDURE — 87340 HEPATITIS B SURFACE AG IA: CPT

## 2025-06-25 PROCEDURE — 86706 HEP B SURFACE ANTIBODY: CPT

## 2025-06-26 LAB
MITOGEN MINUS NIL (OHS): 9.96
NIL TB SYNCED (OHS): 0.04
QUANTIFERON GOLD INTERP (OHS): NEGATIVE
TB1 AG MINUS NIL (OHS): 0.03
TB2 AG MINUS NIL (OHS): 0.01

## 2025-06-28 LAB
W HEPATITIS B SURFACE ANTIBODY, QUALITATIVE: NEGATIVE
W HEPATITIS B SURFACE ANTIBODY, QUANTITATIVE: <3 MIU/ML

## 2025-07-07 ENCOUNTER — PATIENT MESSAGE (OUTPATIENT)
Dept: OBSTETRICS AND GYNECOLOGY | Facility: CLINIC | Age: 55
End: 2025-07-07
Payer: COMMERCIAL

## 2025-07-18 ENCOUNTER — HOSPITAL ENCOUNTER (OUTPATIENT)
Dept: RADIOLOGY | Facility: HOSPITAL | Age: 55
Discharge: HOME | End: 2025-07-18
Attending: INTERNAL MEDICINE

## 2025-07-18 DIAGNOSIS — Z13.6 ENCOUNTER FOR SCREENING FOR CARDIOVASCULAR DISORDERS: ICD-10-CM

## 2025-07-18 DIAGNOSIS — Z91.89 AT HIGH RISK FOR CORONARY ARTERY DISEASE: ICD-10-CM

## 2025-07-18 PROCEDURE — 75571 CT HRT W/O DYE W/CA TEST: CPT | Mod: TC

## 2025-07-18 PROCEDURE — 75571 CT HRT W/O DYE W/CA TEST: CPT | Mod: 26,,, | Performed by: RADIOLOGY

## 2025-08-01 DIAGNOSIS — K51.311 ULCERATIVE RECTOSIGMOIDITIS WITH RECTAL BLEEDING: ICD-10-CM

## 2025-08-01 NOTE — TELEPHONE ENCOUNTER
"Primary provider: Dr. Bran Breaux    IBD medications: Stelara 90 mg every 5-6 weeks     Refill request for:      Pended Medication(s)   Requested Prescriptions     Pending Prescriptions Disp Refills    STELARA 90 mg/mL Syrg syringe [Pharmacy Med Name: STELARA  90MG PREFILLED SYRINGE  ML PFS] 1 mL 0     Sig: INJECT 1 SYRINGE SUBCUTANEOUSLY  EVERY 6 WEEKS           Allergies reviewed: Yes    Drug Monitoring labs/frequency for all IBD meds:    CBC and CMP every 6 months  TB and Hep B every 12 months    Lab Results   Component Value Date    HEPBSAG Non-Reactive 06/25/2025    HEPBCAB Non-Reactive 06/25/2025     Lab Results   Component Value Date    TBGOLDPLUS Negative 06/12/2024     Lab Results   Component Value Date    QUANTNILVALU 0.44021 06/25/2025    QUANTTBGDPL Negative 06/25/2025      No results found for: "TSPOTSCREN"  Lab Results   Component Value Date    VVSXMENP79MZ 62 03/02/2023    TJSNAVQG33 >2000 (H) 07/29/2022     Lab Results   Component Value Date    WBC 5.98 05/14/2025    HGB 13.4 05/14/2025    HCT 41.0 05/14/2025    MCV 97 05/14/2025     05/14/2025     Lab Results   Component Value Date    CREATININE 0.7 05/14/2025    ALBUMIN 4.2 05/14/2025    BILITOT 0.5 05/14/2025    ALKPHOS 74 05/14/2025    AST 18 05/14/2025    ALT 13 05/14/2025     Lab Results   Component Value Date    CHOL 232 (H) 05/14/2025    HDL 63 05/14/2025    LDLCALC 152.8 05/14/2025    TRIG 81 05/14/2025       Lab due date (mo/yr):  11/2025    Labs scheduled: No - but patient has an OV before or when labs are due     Next appt: 9/3/25 w/Dr. Gallagher    RX refill sent to provider for amount until next labs: No-Will route to IBD Pharmacists as "Non-Participating Provider"       "

## 2025-08-04 RX ORDER — USTEKINUMAB 90 MG/ML
INJECTION, SOLUTION SUBCUTANEOUS
Qty: 1 ML | Refills: 1 | Status: SHIPPED | OUTPATIENT
Start: 2025-08-04

## 2025-08-04 NOTE — TELEPHONE ENCOUNTER
"Primary provider: Dr. Bran Breaux    IBD medications: Stelara 90 mg every 6 weeks     Refill request for:      Pended Medication(s)   Requested Prescriptions     Pending Prescriptions Disp Refills    STELARA 90 mg/mL Syrg syringe [Pharmacy Med Name: STELARA  90MG PREFILLED SYRINGE  ML PFS] 1 mL 0     Sig: INJECT 1 SYRINGE SUBCUTANEOUSLY  EVERY 6 WEEKS           Allergies reviewed: Yes    Drug Monitoring labs/frequency for all IBD meds:    CBC and CMP every 6 months  TB and Hep B every 12 months    Lab Results   Component Value Date    HEPBSAG Non-Reactive 06/25/2025    HEPBCAB Non-Reactive 06/25/2025     Lab Results   Component Value Date    TBGOLDPLUS Negative 06/12/2024     Lab Results   Component Value Date    QUANTNILVALU 0.75497 06/25/2025    QUANTTBGDPL Negative 06/25/2025      No results found for: "TSPOTSCREN"  Lab Results   Component Value Date    QIOLDDLR62AM 62 03/02/2023    HUZUVQVS35 >2000 (H) 07/29/2022     Lab Results   Component Value Date    WBC 5.98 05/14/2025    HGB 13.4 05/14/2025    HCT 41.0 05/14/2025    MCV 97 05/14/2025     05/14/2025     Lab Results   Component Value Date    CREATININE 0.7 05/14/2025    ALBUMIN 4.2 05/14/2025    BILITOT 0.5 05/14/2025    ALKPHOS 74 05/14/2025    AST 18 05/14/2025    ALT 13 05/14/2025     Lab Results   Component Value Date    CHOL 232 (H) 05/14/2025    HDL 63 05/14/2025    LDLCALC 152.8 05/14/2025    TRIG 81 05/14/2025       Lab due date (mo/yr):  11/2025    Labs scheduled: No - but patient has an OV before or when labs are due     Next appt: 9/3/25 - Establish care with Dr. Gallagher     RX refill sent to provider for amount until next labs: Yes         "

## 2025-08-07 ENCOUNTER — HOSPITAL ENCOUNTER (OUTPATIENT)
Dept: RADIOLOGY | Facility: HOSPITAL | Age: 55
Discharge: HOME | End: 2025-08-07
Attending: OBSTETRICS & GYNECOLOGY
Payer: COMMERCIAL

## 2025-08-07 VITALS — HEIGHT: 65 IN | WEIGHT: 117 LBS | BODY MASS INDEX: 19.49 KG/M2

## 2025-08-07 DIAGNOSIS — Z12.31 ENCOUNTER FOR SCREENING MAMMOGRAM FOR BREAST CANCER: ICD-10-CM

## 2025-08-07 PROCEDURE — 77063 BREAST TOMOSYNTHESIS BI: CPT | Mod: TC

## 2025-08-21 ENCOUNTER — TELEPHONE (OUTPATIENT)
Facility: CLINIC | Age: 55
End: 2025-08-21
Payer: COMMERCIAL

## 2025-08-22 ENCOUNTER — TELEPHONE (OUTPATIENT)
Dept: ORTHOPEDICS | Facility: CLINIC | Age: 55
End: 2025-08-22
Payer: COMMERCIAL

## (undated) DEVICE — Device

## (undated) DEVICE — EVACUATOR WOUND BULB 100CC

## (undated) DEVICE — SOL PVP-I SCRUB 7.5% 4OZ

## (undated) DEVICE — BRA STYLE 7 SIZE 34

## (undated) DEVICE — TRAY FOLEY 16FR INFECTION CONT

## (undated) DEVICE — DRAIN BLAKE HUBLS 10F RD

## (undated) DEVICE — SKIN MARKER DEVON 160

## (undated) DEVICE — TIP YANKAUERS BULB NO VENT

## (undated) DEVICE — SEAL LENS SCOPE MYOSURE

## (undated) DEVICE — PACK FLUENT DISPOSABLE

## (undated) DEVICE — DEVICE MYOSURE REACH

## (undated) DEVICE — BLADE SURG STAINLESS STEEL #10

## (undated) DEVICE — SUT 4/0 18IN ETHILON BL P3

## (undated) DEVICE — DRAPE THREE-QTR REINF 53X77IN

## (undated) DEVICE — SUT VICRYL PLUS 2-0 CT1 18

## (undated) DEVICE — SOL IRR SOD CHL .9% POUR

## (undated) DEVICE — GLOVE SENSICARE PI SURG 6.5

## (undated) DEVICE — TOWEL OR DISP STRL BLUE 4/PK

## (undated) DEVICE — ELECTRODE REM PLYHSV RETURN 9

## (undated) DEVICE — SUT VICRYL PLUS 3-0 SH 18IN

## (undated) DEVICE — CLOSURE SKIN STERI STRIP 1/2X4

## (undated) DEVICE — ELECTRODE BLADE INSULATED 1 IN

## (undated) DEVICE — ELECTRODE NEEDLE 1IN

## (undated) DEVICE — MARKER SKIN STND TIP BLUE BARR

## (undated) DEVICE — BRA CLASSIC COMFORT BLK SZ 34

## (undated) DEVICE — BLADE SURG STAINLESS STEEL #15

## (undated) DEVICE — SOL POVIDONE SCRUB IODINE 4 OZ

## (undated) DEVICE — SOL POVIDONE PREP IODINE 4 OZ

## (undated) DEVICE — PAD ABD 8X10 STERILE

## (undated) DEVICE — STAPLER SKIN ROTATING HEAD

## (undated) DEVICE — SPONGE LAP 18X18 PREWASHED

## (undated) DEVICE — SOL NACL IRR 3000ML

## (undated) DEVICE — GLOVE SENSICARE PI GRN 6.5

## (undated) DEVICE — DRESSING TRANS 2X2 TEGADERM

## (undated) DEVICE — SET BASIN 48X48IN 6000ML RING

## (undated) DEVICE — SPONGE DERMACEA GAUZE 4X4

## (undated) DEVICE — SUT MCRYL PLUS 4-0 PS2 27IN